# Patient Record
Sex: FEMALE | Race: WHITE | NOT HISPANIC OR LATINO | Employment: FULL TIME | URBAN - METROPOLITAN AREA
[De-identification: names, ages, dates, MRNs, and addresses within clinical notes are randomized per-mention and may not be internally consistent; named-entity substitution may affect disease eponyms.]

---

## 2017-04-14 ENCOUNTER — CONVERSION ENCOUNTER (OUTPATIENT)
Dept: MAMMOGRAPHY | Facility: HOSPITAL | Age: 52
End: 2017-04-14

## 2017-11-21 ENCOUNTER — CONVERSION ENCOUNTER (OUTPATIENT)
Dept: GENERAL RADIOLOGY | Facility: HOSPITAL | Age: 52
End: 2017-11-21

## 2018-03-19 ENCOUNTER — OFFICE VISIT CONVERTED (OUTPATIENT)
Dept: ONCOLOGY | Facility: HOSPITAL | Age: 53
End: 2018-03-19
Attending: INTERNAL MEDICINE

## 2018-07-20 ENCOUNTER — CONVERSION ENCOUNTER (OUTPATIENT)
Dept: MAMMOGRAPHY | Facility: HOSPITAL | Age: 53
End: 2018-07-20

## 2018-08-10 ENCOUNTER — OFFICE VISIT CONVERTED (OUTPATIENT)
Dept: FAMILY MEDICINE CLINIC | Facility: CLINIC | Age: 53
End: 2018-08-10
Attending: FAMILY MEDICINE

## 2018-08-27 ENCOUNTER — OFFICE VISIT CONVERTED (OUTPATIENT)
Dept: ONCOLOGY | Facility: HOSPITAL | Age: 53
End: 2018-08-27
Attending: NURSE PRACTITIONER

## 2018-10-08 ENCOUNTER — OFFICE VISIT CONVERTED (OUTPATIENT)
Dept: FAMILY MEDICINE CLINIC | Facility: CLINIC | Age: 53
End: 2018-10-08
Attending: FAMILY MEDICINE

## 2018-11-09 ENCOUNTER — OFFICE VISIT CONVERTED (OUTPATIENT)
Dept: FAMILY MEDICINE CLINIC | Facility: CLINIC | Age: 53
End: 2018-11-09
Attending: FAMILY MEDICINE

## 2018-12-12 ENCOUNTER — OFFICE VISIT CONVERTED (OUTPATIENT)
Dept: FAMILY MEDICINE CLINIC | Facility: CLINIC | Age: 53
End: 2018-12-12
Attending: FAMILY MEDICINE

## 2018-12-12 ENCOUNTER — CONVERSION ENCOUNTER (OUTPATIENT)
Dept: FAMILY MEDICINE CLINIC | Facility: CLINIC | Age: 53
End: 2018-12-12

## 2019-03-07 ENCOUNTER — HOSPITAL ENCOUNTER (OUTPATIENT)
Dept: ONCOLOGY | Facility: HOSPITAL | Age: 54
Discharge: HOME | End: 2019-03-07
Attending: INTERNAL MEDICINE

## 2019-03-07 LAB
CALCIUM SERPL-MCNC: 9.3 MG/DL (ref 8.7–10.4)
CREAT UR-MCNC: 1.39 MG/DL (ref 0.5–0.9)
MAGNESIUM SERPL-MCNC: 1.54 MG/DL (ref 1.6–2.3)
PHOSPHATE SERPL-MCNC: 2.7 MG/DL (ref 2.4–4.5)

## 2019-03-11 ENCOUNTER — OFFICE VISIT CONVERTED (OUTPATIENT)
Dept: FAMILY MEDICINE CLINIC | Facility: CLINIC | Age: 54
End: 2019-03-11
Attending: FAMILY MEDICINE

## 2019-03-11 ENCOUNTER — HOSPITAL ENCOUNTER (OUTPATIENT)
Dept: FAMILY MEDICINE CLINIC | Facility: CLINIC | Age: 54
Discharge: HOME | End: 2019-03-11

## 2019-03-11 LAB
CHOLEST SERPL-MCNC: 255 MG/DL (ref 107–200)
CHOLEST/HDLC SERPL: 4.3 {RATIO} (ref 3–6)
CONV CREATININE URINE, RANDOM: 57.2 MG/DL (ref 10–300)
CONV MICROALBUM.,U,RANDOM: <12 MG/L (ref 0–20)
EST. AVERAGE GLUCOSE BLD GHB EST-MCNC: 131 MG/DL
HBA1C MFR BLD: 6.2 % (ref 3.5–5.7)
HDLC SERPL-MCNC: 59 MG/DL (ref 40–60)
LDLC SERPL CALC-MCNC: 157 MG/DL (ref 70–100)
MICROALBUMIN/CREAT UR: 21 MG/G{CRE} (ref 0–35)
TRIGL SERPL-MCNC: 193 MG/DL (ref 40–150)
TSH SERPL-ACNC: 8.78 M[IU]/L (ref 0.27–4.2)
VLDLC SERPL-MCNC: 39 MG/DL (ref 5–37)

## 2019-03-14 ENCOUNTER — HOSPITAL ENCOUNTER (OUTPATIENT)
Dept: ONCOLOGY | Facility: HOSPITAL | Age: 54
Discharge: HOME | End: 2019-03-14
Attending: INTERNAL MEDICINE

## 2019-03-14 ENCOUNTER — OFFICE VISIT CONVERTED (OUTPATIENT)
Dept: ONCOLOGY | Facility: HOSPITAL | Age: 54
End: 2019-03-14
Attending: INTERNAL MEDICINE

## 2019-03-15 ENCOUNTER — HOSPITAL ENCOUNTER (OUTPATIENT)
Dept: OTHER | Facility: HOSPITAL | Age: 54
Discharge: HOME | End: 2019-03-15
Attending: INTERNAL MEDICINE

## 2019-08-12 ENCOUNTER — OFFICE VISIT CONVERTED (OUTPATIENT)
Dept: FAMILY MEDICINE CLINIC | Facility: CLINIC | Age: 54
End: 2019-08-12
Attending: FAMILY MEDICINE

## 2019-08-12 ENCOUNTER — HOSPITAL ENCOUNTER (OUTPATIENT)
Dept: FAMILY MEDICINE CLINIC | Facility: CLINIC | Age: 54
Discharge: HOME | End: 2019-08-12

## 2019-08-12 LAB
ALBUMIN SERPL-MCNC: 4.5 G/DL (ref 3.5–5)
ALBUMIN/GLOB SERPL: 1.7 {RATIO} (ref 1.4–2.6)
ALP SERPL-CCNC: 65 U/L (ref 53–141)
ALT SERPL-CCNC: 30 U/L (ref 10–40)
ANION GAP SERPL CALC-SCNC: 17 MMOL/L (ref 8–19)
AST SERPL-CCNC: 24 U/L (ref 15–50)
BILIRUB SERPL-MCNC: 0.4 MG/DL (ref 0.2–1.3)
BUN SERPL-MCNC: 20 MG/DL (ref 5–25)
BUN/CREAT SERPL: 14 {RATIO} (ref 6–20)
CALCIUM SERPL-MCNC: 9.5 MG/DL (ref 8.7–10.4)
CHLORIDE SERPL-SCNC: 106 MMOL/L (ref 99–111)
CHOLEST SERPL-MCNC: 136 MG/DL (ref 107–200)
CHOLEST/HDLC SERPL: 3 {RATIO} (ref 3–6)
CONV CO2: 22 MMOL/L (ref 22–32)
CONV TOTAL PROTEIN: 7.2 G/DL (ref 6.3–8.2)
CREAT UR-MCNC: 1.38 MG/DL (ref 0.5–0.9)
EST. AVERAGE GLUCOSE BLD GHB EST-MCNC: 171 MG/DL
GFR SERPLBLD BASED ON 1.73 SQ M-ARVRAT: 43 ML/MIN/{1.73_M2}
GLOBULIN UR ELPH-MCNC: 2.7 G/DL (ref 2–3.5)
GLUCOSE SERPL-MCNC: 145 MG/DL (ref 65–99)
HBA1C MFR BLD: 7.6 % (ref 3.5–5.7)
HDLC SERPL-MCNC: 45 MG/DL (ref 40–60)
LDLC SERPL CALC-MCNC: 65 MG/DL (ref 70–100)
OSMOLALITY SERPL CALC.SUM OF ELEC: 295 MOSM/KG (ref 273–304)
POTASSIUM SERPL-SCNC: 4.8 MMOL/L (ref 3.5–5.3)
SODIUM SERPL-SCNC: 140 MMOL/L (ref 135–147)
TRIGL SERPL-MCNC: 128 MG/DL (ref 40–150)
TSH SERPL-ACNC: 5.54 M[IU]/L (ref 0.27–4.2)
VLDLC SERPL-MCNC: 26 MG/DL (ref 5–37)

## 2019-09-05 ENCOUNTER — HOSPITAL ENCOUNTER (OUTPATIENT)
Dept: OTHER | Facility: HOSPITAL | Age: 54
Setting detail: RECURRING SERIES
Discharge: HOME | End: 2019-09-30
Attending: NURSE PRACTITIONER

## 2019-09-05 LAB
ALBUMIN SERPL-MCNC: 4.6 G/DL (ref 3.5–5)
ALBUMIN/GLOB SERPL: 1.8 {RATIO} (ref 1.4–2.6)
ALP SERPL-CCNC: 65 U/L (ref 53–141)
ALT SERPL-CCNC: 31 U/L (ref 10–40)
ANION GAP SERPL CALC-SCNC: 14 MMOL/L (ref 8–19)
AST SERPL-CCNC: 24 U/L (ref 15–50)
BASOPHILS # BLD AUTO: 0.02 10*3/UL (ref 0–0.2)
BASOPHILS NFR BLD AUTO: 0.3 % (ref 0–3)
BILIRUB SERPL-MCNC: 0.29 MG/DL (ref 0.2–1.3)
BUN SERPL-MCNC: 21 MG/DL (ref 5–25)
BUN/CREAT SERPL: 14 {RATIO} (ref 6–20)
CALCIUM SERPL-MCNC: 9.4 MG/DL (ref 8.7–10.4)
CHLORIDE SERPL-SCNC: 105 MMOL/L (ref 99–111)
CONV ABS IMM GRAN: 0.04 10*3/UL (ref 0–0.2)
CONV CO2: 25 MMOL/L (ref 22–32)
CONV IMMATURE GRAN: 0.7 % (ref 0–1.8)
CONV TOTAL PROTEIN: 7.2 G/DL (ref 6.3–8.2)
CREAT UR-MCNC: 1.48 MG/DL (ref 0.5–0.9)
DEPRECATED RDW RBC AUTO: 47 FL (ref 36.4–46.3)
EOSINOPHIL # BLD AUTO: 0.03 10*3/UL (ref 0–0.7)
EOSINOPHIL # BLD AUTO: 0.5 % (ref 0–7)
ERYTHROCYTE [DISTWIDTH] IN BLOOD BY AUTOMATED COUNT: 14.6 % (ref 11.7–14.4)
GFR SERPLBLD BASED ON 1.73 SQ M-ARVRAT: 40 ML/MIN/{1.73_M2}
GLOBULIN UR ELPH-MCNC: 2.6 G/DL (ref 2–3.5)
GLUCOSE SERPL-MCNC: 217 MG/DL (ref 65–99)
HCT VFR BLD AUTO: 35.4 % (ref 37–47)
HGB BLD-MCNC: 11.3 G/DL (ref 12–16)
LYMPHOCYTES # BLD AUTO: 1.37 10*3/UL (ref 1–5)
LYMPHOCYTES NFR BLD AUTO: 23.8 % (ref 20–45)
MAGNESIUM SERPL-MCNC: 1.73 MG/DL (ref 1.6–2.3)
MCH RBC QN AUTO: 28.3 PG (ref 27–31)
MCHC RBC AUTO-ENTMCNC: 31.9 G/DL (ref 33–37)
MCV RBC AUTO: 88.7 FL (ref 81–99)
MONOCYTES # BLD AUTO: 0.36 10*3/UL (ref 0.2–1.2)
MONOCYTES NFR BLD AUTO: 6.3 % (ref 3–10)
NEUTROPHILS # BLD AUTO: 3.93 10*3/UL (ref 2–8)
NEUTROPHILS NFR BLD AUTO: 68.4 % (ref 30–85)
NRBC CBCN: 0 % (ref 0–0.7)
OSMOLALITY SERPL CALC.SUM OF ELEC: 300 MOSM/KG (ref 273–304)
PHOSPHATE SERPL-MCNC: 2.9 MG/DL (ref 2.4–4.5)
PLATELET # BLD AUTO: 206 10*3/UL (ref 130–400)
PMV BLD AUTO: 10.7 FL (ref 9.4–12.3)
POTASSIUM SERPL-SCNC: 4.2 MMOL/L (ref 3.5–5.3)
RBC # BLD AUTO: 3.99 10*6/UL (ref 4.2–5.4)
SODIUM SERPL-SCNC: 140 MMOL/L (ref 135–147)
WBC # BLD AUTO: 5.75 10*3/UL (ref 4.8–10.8)

## 2019-09-12 ENCOUNTER — OFFICE VISIT CONVERTED (OUTPATIENT)
Dept: ONCOLOGY | Facility: HOSPITAL | Age: 54
End: 2019-09-12
Attending: INTERNAL MEDICINE

## 2019-09-12 ENCOUNTER — HOSPITAL ENCOUNTER (OUTPATIENT)
Dept: OTHER | Facility: HOSPITAL | Age: 54
Discharge: HOME | End: 2019-09-12
Attending: INTERNAL MEDICINE

## 2019-09-23 ENCOUNTER — OFFICE VISIT CONVERTED (OUTPATIENT)
Dept: FAMILY MEDICINE CLINIC | Facility: CLINIC | Age: 54
End: 2019-09-23
Attending: FAMILY MEDICINE

## 2019-09-23 ENCOUNTER — HOSPITAL ENCOUNTER (OUTPATIENT)
Dept: FAMILY MEDICINE CLINIC | Facility: CLINIC | Age: 54
Discharge: HOME | End: 2019-09-23
Attending: FAMILY MEDICINE

## 2019-09-23 LAB
ALBUMIN SERPL-MCNC: 4.9 G/DL (ref 3.5–5)
ALBUMIN/GLOB SERPL: 1.8 {RATIO} (ref 1.4–2.6)
ALP SERPL-CCNC: 68 U/L (ref 53–141)
ALT SERPL-CCNC: 31 U/L (ref 10–40)
AMYLASE SERPL-CCNC: 101 U/L (ref 30–110)
ANION GAP SERPL CALC-SCNC: 22 MMOL/L (ref 8–19)
AST SERPL-CCNC: 24 U/L (ref 15–50)
BASOPHILS # BLD AUTO: 0.02 10*3/UL (ref 0–0.2)
BASOPHILS NFR BLD AUTO: 0.3 % (ref 0–3)
BILIRUB SERPL-MCNC: 0.31 MG/DL (ref 0.2–1.3)
BUN SERPL-MCNC: 23 MG/DL (ref 5–25)
BUN/CREAT SERPL: 17 {RATIO} (ref 6–20)
CALCIUM SERPL-MCNC: 9.4 MG/DL (ref 8.7–10.4)
CHLORIDE SERPL-SCNC: 101 MMOL/L (ref 99–111)
CONV ABS IMM GRAN: 0.02 10*3/UL (ref 0–0.2)
CONV CO2: 18 MMOL/L (ref 22–32)
CONV IMMATURE GRAN: 0.3 % (ref 0–1.8)
CONV TOTAL PROTEIN: 7.6 G/DL (ref 6.3–8.2)
CREAT UR-MCNC: 1.39 MG/DL (ref 0.5–0.9)
DEPRECATED RDW RBC AUTO: 47 FL (ref 36.4–46.3)
EOSINOPHIL # BLD AUTO: 0.02 10*3/UL (ref 0–0.7)
EOSINOPHIL # BLD AUTO: 0.3 % (ref 0–7)
ERYTHROCYTE [DISTWIDTH] IN BLOOD BY AUTOMATED COUNT: 14.6 % (ref 11.7–14.4)
GFR SERPLBLD BASED ON 1.73 SQ M-ARVRAT: 43 ML/MIN/{1.73_M2}
GLOBULIN UR ELPH-MCNC: 2.7 G/DL (ref 2–3.5)
GLUCOSE SERPL-MCNC: 335 MG/DL (ref 65–99)
HCT VFR BLD AUTO: 38.4 % (ref 37–47)
HGB BLD-MCNC: 12 G/DL (ref 12–16)
LIPASE SERPL-CCNC: 79 U/L (ref 5–51)
LYMPHOCYTES # BLD AUTO: 1.79 10*3/UL (ref 1–5)
LYMPHOCYTES NFR BLD AUTO: 28.1 % (ref 20–45)
MCH RBC QN AUTO: 27.9 PG (ref 27–31)
MCHC RBC AUTO-ENTMCNC: 31.3 G/DL (ref 33–37)
MCV RBC AUTO: 89.3 FL (ref 81–99)
MONOCYTES # BLD AUTO: 0.44 10*3/UL (ref 0.2–1.2)
MONOCYTES NFR BLD AUTO: 6.9 % (ref 3–10)
NEUTROPHILS # BLD AUTO: 4.08 10*3/UL (ref 2–8)
NEUTROPHILS NFR BLD AUTO: 64.1 % (ref 30–85)
NRBC CBCN: 0 % (ref 0–0.7)
OSMOLALITY SERPL CALC.SUM OF ELEC: 301 MOSM/KG (ref 273–304)
PLATELET # BLD AUTO: 211 10*3/UL (ref 130–400)
PMV BLD AUTO: 11.5 FL (ref 9.4–12.3)
POTASSIUM SERPL-SCNC: 4.4 MMOL/L (ref 3.5–5.3)
RBC # BLD AUTO: 4.3 10*6/UL (ref 4.2–5.4)
SODIUM SERPL-SCNC: 137 MMOL/L (ref 135–147)
WBC # BLD AUTO: 6.37 10*3/UL (ref 4.8–10.8)

## 2019-09-27 ENCOUNTER — HOSPITAL ENCOUNTER (OUTPATIENT)
Dept: FAMILY MEDICINE CLINIC | Facility: CLINIC | Age: 54
Discharge: HOME | End: 2019-09-27
Attending: FAMILY MEDICINE

## 2019-09-27 ENCOUNTER — OFFICE VISIT CONVERTED (OUTPATIENT)
Dept: FAMILY MEDICINE CLINIC | Facility: CLINIC | Age: 54
End: 2019-09-27
Attending: FAMILY MEDICINE

## 2019-09-27 LAB
ALBUMIN SERPL-MCNC: 4.6 G/DL (ref 3.5–5)
ANION GAP SERPL CALC-SCNC: 21 MMOL/L (ref 8–19)
BUN SERPL-MCNC: 23 MG/DL (ref 5–25)
BUN/CREAT SERPL: 15 {RATIO} (ref 6–20)
CALCIUM SERPL-MCNC: 10.4 MG/DL (ref 8.7–10.4)
CHLORIDE SERPL-SCNC: 100 MMOL/L (ref 99–111)
CONV CO2: 24 MMOL/L (ref 22–32)
CREAT UR-MCNC: 1.51 MG/DL (ref 0.5–0.9)
EST. AVERAGE GLUCOSE BLD GHB EST-MCNC: 151 MG/DL
GFR SERPLBLD BASED ON 1.73 SQ M-ARVRAT: 39 ML/MIN/{1.73_M2}
GLUCOSE SERPL-MCNC: 143 MG/DL (ref 65–99)
HBA1C MFR BLD: 6.9 % (ref 3.5–5.7)
PHOSPHATE SERPL-MCNC: 3.7 MG/DL (ref 2.4–4.5)
POTASSIUM SERPL-SCNC: 4.2 MMOL/L (ref 3.5–5.3)
SODIUM SERPL-SCNC: 141 MMOL/L (ref 135–147)

## 2019-10-09 ENCOUNTER — OFFICE VISIT CONVERTED (OUTPATIENT)
Dept: FAMILY MEDICINE CLINIC | Facility: CLINIC | Age: 54
End: 2019-10-09
Attending: FAMILY MEDICINE

## 2020-01-10 ENCOUNTER — OFFICE VISIT CONVERTED (OUTPATIENT)
Dept: FAMILY MEDICINE CLINIC | Facility: CLINIC | Age: 55
End: 2020-01-10
Attending: FAMILY MEDICINE

## 2020-01-10 ENCOUNTER — HOSPITAL ENCOUNTER (OUTPATIENT)
Dept: FAMILY MEDICINE CLINIC | Facility: CLINIC | Age: 55
Discharge: HOME | End: 2020-01-10
Attending: FAMILY MEDICINE

## 2020-01-10 LAB
ALBUMIN SERPL-MCNC: 4.4 G/DL (ref 3.5–5)
ALBUMIN/GLOB SERPL: 1.5 {RATIO} (ref 1.4–2.6)
ALP SERPL-CCNC: 72 U/L (ref 53–141)
ALT SERPL-CCNC: 23 U/L (ref 10–40)
ANION GAP SERPL CALC-SCNC: 19 MMOL/L (ref 8–19)
AST SERPL-CCNC: 21 U/L (ref 15–50)
BASOPHILS # BLD AUTO: 0.02 10*3/UL (ref 0–0.2)
BASOPHILS # BLD: 0 % (ref 0–3)
BASOPHILS NFR BLD AUTO: 0.2 % (ref 0–3)
BILIRUB SERPL-MCNC: 0.36 MG/DL (ref 0.2–1.3)
BUN SERPL-MCNC: 32 MG/DL (ref 5–25)
BUN/CREAT SERPL: 12 {RATIO} (ref 6–20)
BURR CELLS BLD QL SMEAR: SLIGHT
CALCIUM SERPL-MCNC: 9.9 MG/DL (ref 8.7–10.4)
CHLORIDE SERPL-SCNC: 100 MMOL/L (ref 99–111)
CHOLEST SERPL-MCNC: 141 MG/DL (ref 107–200)
CHOLEST/HDLC SERPL: 3.4 {RATIO} (ref 3–6)
CONV ABS BANDS: 1 % (ref 1–5)
CONV ABS IMM GRAN: 0.08 10*3/UL (ref 0–0.2)
CONV ANISOCYTES: SLIGHT
CONV ATYPICAL LYMPHOCYTES: 1 % (ref 0–5)
CONV CO2: 20 MMOL/L (ref 22–32)
CONV IMMATURE GRAN: 0.9 % (ref 0–1.8)
CONV SEGMENTED NEUTROPHILS: 67 % (ref 45–70)
CONV TOTAL PROTEIN: 7.4 G/DL (ref 6.3–8.2)
CREAT UR-MCNC: 2.75 MG/DL (ref 0.5–0.9)
DEPRECATED RDW RBC AUTO: 48 FL (ref 36.4–46.3)
EOSINOPHIL # BLD AUTO: 0.11 10*3/UL (ref 0–0.7)
EOSINOPHIL # BLD AUTO: 1.3 % (ref 0–7)
EOSINOPHIL NFR BLD AUTO: 0 % (ref 0–7)
ERYTHROCYTE [DISTWIDTH] IN BLOOD BY AUTOMATED COUNT: 15.3 % (ref 11.7–14.4)
EST. AVERAGE GLUCOSE BLD GHB EST-MCNC: 163 MG/DL
GFR SERPLBLD BASED ON 1.73 SQ M-ARVRAT: 19 ML/MIN/{1.73_M2}
GLOBULIN UR ELPH-MCNC: 3 G/DL (ref 2–3.5)
GLUCOSE SERPL-MCNC: 340 MG/DL (ref 65–99)
HBA1C MFR BLD: 7.3 % (ref 3.5–5.7)
HCT VFR BLD AUTO: 40.2 % (ref 37–47)
HDLC SERPL-MCNC: 42 MG/DL (ref 40–60)
HGB BLD-MCNC: 12.4 G/DL (ref 12–16)
LDLC SERPL CALC-MCNC: 69 MG/DL (ref 70–100)
LYMPHOCYTES # BLD AUTO: 1.81 10*3/UL (ref 1–5)
LYMPHOCYTES NFR BLD AUTO: 21 % (ref 20–45)
MCH RBC QN AUTO: 27.3 PG (ref 27–31)
MCHC RBC AUTO-ENTMCNC: 30.8 G/DL (ref 33–37)
MCV RBC AUTO: 88.5 FL (ref 81–99)
MONOCYTES # BLD AUTO: 0.57 10*3/UL (ref 0.2–1.2)
MONOCYTES NFR BLD AUTO: 6.6 % (ref 3–10)
MONOCYTES NFR BLD MANUAL: 7 % (ref 3–10)
NEUTROPHILS # BLD AUTO: 6.01 10*3/UL (ref 2–8)
NEUTROPHILS NFR BLD AUTO: 70 % (ref 30–85)
NRBC CBCN: 0 % (ref 0–0.7)
NUC CELL # PRT MANUAL: 0 /100{WBCS}
OSMOLALITY SERPL CALC.SUM OF ELEC: 300 MOSM/KG (ref 273–304)
OVALOCYTES BLD QL SMEAR: SLIGHT
PLAT MORPH BLD: NORMAL
PLATELET # BLD AUTO: 230 10*3/UL (ref 130–400)
PMV BLD AUTO: 11.2 FL (ref 9.4–12.3)
POIKILOCYTOSIS BLD QL SMEAR: SLIGHT
POLYCHROMASIA BLD QL SMEAR: SLIGHT
POTASSIUM SERPL-SCNC: 4.2 MMOL/L (ref 3.5–5.3)
RBC # BLD AUTO: 4.54 10*6/UL (ref 4.2–5.4)
SMALL PLATELETS BLD QL SMEAR: ADEQUATE
SODIUM SERPL-SCNC: 135 MMOL/L (ref 135–147)
SPHEROCYTES BLD QL SMEAR: SLIGHT
T4 FREE SERPL-MCNC: 1 NG/DL (ref 0.9–1.8)
TRIGL SERPL-MCNC: 151 MG/DL (ref 40–150)
TSH SERPL-ACNC: 11.55 M[IU]/L (ref 0.27–4.2)
VARIANT LYMPHS NFR BLD MANUAL: 24 % (ref 20–45)
VLDLC SERPL-MCNC: 30 MG/DL (ref 5–37)
WBC # BLD AUTO: 8.6 10*3/UL (ref 4.8–10.8)

## 2020-01-16 ENCOUNTER — HOSPITAL ENCOUNTER (OUTPATIENT)
Dept: OTHER | Facility: HOSPITAL | Age: 55
Discharge: HOME | End: 2020-01-16
Attending: INTERNAL MEDICINE

## 2020-01-16 LAB
ALBUMIN SERPL-MCNC: 4.3 G/DL (ref 3.5–5)
ALBUMIN/GLOB SERPL: 1.3 {RATIO} (ref 1.4–2.6)
ALP SERPL-CCNC: 66 U/L (ref 53–141)
ALT SERPL-CCNC: 21 U/L (ref 10–40)
ANION GAP SERPL CALC-SCNC: 16 MMOL/L (ref 8–19)
APPEARANCE UR: CLEAR
AST SERPL-CCNC: 19 U/L (ref 15–50)
BILIRUB SERPL-MCNC: 0.33 MG/DL (ref 0.2–1.3)
BILIRUB UR QL: NEGATIVE
BUN SERPL-MCNC: 40 MG/DL (ref 5–25)
BUN/CREAT SERPL: 20 {RATIO} (ref 6–20)
CALCIUM SERPL-MCNC: 9.9 MG/DL (ref 8.7–10.4)
CHLORIDE SERPL-SCNC: 99 MMOL/L (ref 99–111)
COLOR UR: YELLOW
CONV BACTERIA: NEGATIVE
CONV CO2: 24 MMOL/L (ref 22–32)
CONV COLLECTION SOURCE (UA): ABNORMAL
CONV CREATININE URINE, RANDOM: 65.2 MG/DL (ref 10–300)
CONV MICROALBUM.,U,RANDOM: 42 MG/L (ref 0–20)
CONV TOTAL PROTEIN: 7.5 G/DL (ref 6.3–8.2)
CONV UROBILINOGEN IN URINE BY AUTOMATED TEST STRIP: 0.2 {EHRLICHU}/DL (ref 0.1–1)
CREAT UR-MCNC: 2.03 MG/DL (ref 0.5–0.9)
EST. AVERAGE GLUCOSE BLD GHB EST-MCNC: 169 MG/DL
GFR SERPLBLD BASED ON 1.73 SQ M-ARVRAT: 27 ML/MIN/{1.73_M2}
GLOBULIN UR ELPH-MCNC: 3.2 G/DL (ref 2–3.5)
GLUCOSE SERPL-MCNC: 127 MG/DL (ref 65–99)
GLUCOSE UR QL: NEGATIVE MG/DL
HBA1C MFR BLD: 7.5 % (ref 3.5–5.7)
HGB UR QL STRIP: ABNORMAL
KETONES UR QL STRIP: NEGATIVE MG/DL
LEUKOCYTE ESTERASE UR QL STRIP: ABNORMAL
MICROALBUMIN/CREAT UR: 64.4 MG/G{CRE} (ref 0–35)
NITRITE UR QL STRIP: NEGATIVE
OSMOLALITY SERPL CALC.SUM OF ELEC: 291 MOSM/KG (ref 273–304)
PH UR STRIP.AUTO: 5 [PH] (ref 5–8)
POTASSIUM SERPL-SCNC: 4.1 MMOL/L (ref 3.5–5.3)
PROT UR QL: 30 MG/DL
RBC #/AREA URNS HPF: ABNORMAL /[HPF]
RENAL EPI CELLS #/AREA URNS HPF: ABNORMAL /[HPF]
SODIUM SERPL-SCNC: 135 MMOL/L (ref 135–147)
SP GR UR: 1.01 (ref 1–1.03)
SQUAMOUS SPT QL MICRO: ABNORMAL /[HPF]
WBC #/AREA URNS HPF: ABNORMAL /[HPF]

## 2020-01-17 ENCOUNTER — OFFICE VISIT CONVERTED (OUTPATIENT)
Dept: FAMILY MEDICINE CLINIC | Facility: CLINIC | Age: 55
End: 2020-01-17
Attending: FAMILY MEDICINE

## 2020-03-11 ENCOUNTER — HOSPITAL ENCOUNTER (OUTPATIENT)
Dept: ONCOLOGY | Facility: HOSPITAL | Age: 55
Discharge: HOME | End: 2020-03-11
Attending: INTERNAL MEDICINE

## 2020-03-11 LAB
ALBUMIN SERPL-MCNC: 4.5 G/DL (ref 3.5–5)
ALBUMIN/GLOB SERPL: 1.7 {RATIO} (ref 1.4–2.6)
ALP SERPL-CCNC: 61 U/L (ref 53–141)
ALT SERPL-CCNC: 26 U/L (ref 10–40)
ANION GAP SERPL CALC-SCNC: 17 MMOL/L (ref 8–19)
AST SERPL-CCNC: 19 U/L (ref 15–50)
BASOPHILS # BLD AUTO: 0.02 10*3/UL (ref 0–0.2)
BASOPHILS NFR BLD AUTO: 0.3 % (ref 0–3)
BILIRUB SERPL-MCNC: 0.34 MG/DL (ref 0.2–1.3)
BUN SERPL-MCNC: 39 MG/DL (ref 5–25)
BUN/CREAT SERPL: 25 {RATIO} (ref 6–20)
CALCIUM SERPL-MCNC: 10.1 MG/DL (ref 8.7–10.4)
CALCIUM SPEC-SCNC: 10.1 MG/DL (ref 8.7–10.4)
CHLORIDE SERPL-SCNC: 99 MMOL/L (ref 99–111)
CONV ABS IMM GRAN: 0.03 10*3/UL (ref 0–0.54)
CONV CO2: 22 MMOL/L (ref 22–32)
CONV EOSINOPHILS PERCENT BY MANUAL COUNT: 1.7 % (ref 0–7)
CONV IMMATURE GRAN: 0.4 % (ref 0–0.4)
CONV TOTAL PROTEIN: 7.1 G/DL (ref 6.3–8.2)
CREAT UR-MCNC: 1.58 MG/DL (ref 0.5–0.9)
EOSINOPHIL # BLD MANUAL: 0.12 10*3/UL (ref 0–0.7)
ERYTHROCYTE [DISTWIDTH] IN BLOOD BY AUTOMATED COUNT: 14.2 % (ref 11.5–14.5)
ERYTHROCYTE [DISTWIDTH] IN BLOOD BY AUTOMATED COUNT: 45.4 FL
GFR SERPLBLD BASED ON 1.73 SQ M-ARVRAT: 37 ML/MIN/{1.73_M2}
GLOBULIN UR ELPH-MCNC: 2.6 G/DL (ref 2–3.5)
GLUCOSE SERPL-MCNC: 189 MG/DL (ref 65–99)
HBA1C MFR BLD: 12.2 G/DL (ref 12–16)
HCT VFR BLD AUTO: 37.6 % (ref 37–47)
LYMPHOCYTES # BLD AUTO: 2.2 10*3/UL (ref 1–5)
LYMPHOCYTES NFR BLD AUTO: 31.3 % (ref 20–45)
MAGNESIUM SERPL-MCNC: 1.6 MG/DL (ref 1.6–2.3)
MCH RBC QN AUTO: 28 PG (ref 27–31)
MCHC RBC AUTO-ENTMCNC: 32.4 G/DL (ref 33–37)
MCV RBC AUTO: 86.2 FL (ref 81–99)
MONOCYTES # BLD AUTO: 0.6 10*3/UL (ref 0.2–1.2)
MONOCYTES NFR BLD MANUAL: 8.5 % (ref 3–10)
NEUTROPHILS # BLD AUTO: 4.05 10*3/UL (ref 2–8)
NEUTROPHILS NFR BLD MANUAL: 57.8 % (ref 30–85)
OSMOLALITY SERPL CALC.SUM OF ELEC: 292 MOSM/KG (ref 273–304)
PHOSPHATE SERPL-MCNC: 3.8 MG/DL (ref 2.4–4.5)
PLATELET # BLD AUTO: 218 10*3/UL (ref 130–400)
PMV BLD AUTO: 10.5 FL (ref 7.4–10.4)
POTASSIUM SERPL-SCNC: 3.9 MMOL/L (ref 3.5–5.3)
RBC MORPH BLD: 4.36 10*6/UL (ref 4.2–5.4)
SODIUM SERPL-SCNC: 134 MMOL/L (ref 135–147)
WBC # BLD AUTO: 7.02 10*3/UL (ref 4.8–10.8)

## 2020-03-13 ENCOUNTER — HOSPITAL ENCOUNTER (OUTPATIENT)
Dept: OTHER | Facility: HOSPITAL | Age: 55
Discharge: HOME | End: 2020-03-13
Attending: INTERNAL MEDICINE

## 2020-03-13 LAB
ALBUMIN SERPL-MCNC: 4.6 G/DL (ref 3.5–5)
ALBUMIN/GLOB SERPL: 1.6 {RATIO} (ref 1.4–2.6)
ALP SERPL-CCNC: 60 U/L (ref 53–141)
ALT SERPL-CCNC: 31 U/L (ref 10–40)
ANION GAP SERPL CALC-SCNC: 17 MMOL/L (ref 8–19)
APPEARANCE UR: CLEAR
AST SERPL-CCNC: 23 U/L (ref 15–50)
BASOPHILS # BLD AUTO: 0.02 10*3/UL (ref 0–0.2)
BASOPHILS NFR BLD AUTO: 0.2 % (ref 0–3)
BILIRUB SERPL-MCNC: 0.42 MG/DL (ref 0.2–1.3)
BILIRUB UR QL: NEGATIVE
BUN SERPL-MCNC: 33 MG/DL (ref 5–25)
BUN/CREAT SERPL: 20 {RATIO} (ref 6–20)
CALCIUM SERPL-MCNC: 9.9 MG/DL (ref 8.7–10.4)
CHLORIDE SERPL-SCNC: 104 MMOL/L (ref 99–111)
COLOR UR: YELLOW
CONV ABS IMM GRAN: 0.03 10*3/UL (ref 0–0.2)
CONV BACTERIA: ABNORMAL
CONV CO2: 23 MMOL/L (ref 22–32)
CONV COLLECTION SOURCE (UA): ABNORMAL
CONV CREATININE URINE, RANDOM: 57.3 MG/DL (ref 10–300)
CONV IMMATURE GRAN: 0.4 % (ref 0–1.8)
CONV MICROALBUM.,U,RANDOM: 38.6 MG/L (ref 0–20)
CONV TOTAL PROTEIN: 7.5 G/DL (ref 6.3–8.2)
CONV UROBILINOGEN IN URINE BY AUTOMATED TEST STRIP: 1 {EHRLICHU}/DL (ref 0.1–1)
CREAT UR-MCNC: 1.68 MG/DL (ref 0.5–0.9)
DEPRECATED RDW RBC AUTO: 46.6 FL (ref 36.4–46.3)
EOSINOPHIL # BLD AUTO: 0.11 10*3/UL (ref 0–0.7)
EOSINOPHIL # BLD AUTO: 1.4 % (ref 0–7)
ERYTHROCYTE [DISTWIDTH] IN BLOOD BY AUTOMATED COUNT: 14.5 % (ref 11.7–14.4)
EST. AVERAGE GLUCOSE BLD GHB EST-MCNC: 166 MG/DL
GFR SERPLBLD BASED ON 1.73 SQ M-ARVRAT: 34 ML/MIN/{1.73_M2}
GLOBULIN UR ELPH-MCNC: 2.9 G/DL (ref 2–3.5)
GLUCOSE SERPL-MCNC: 104 MG/DL (ref 65–99)
GLUCOSE UR QL: >=1000 MG/DL
HBA1C MFR BLD: 7.4 % (ref 3.5–5.7)
HCT VFR BLD AUTO: 38.7 % (ref 37–47)
HGB BLD-MCNC: 12 G/DL (ref 12–16)
HGB UR QL STRIP: NEGATIVE
KETONES UR QL STRIP: NEGATIVE MG/DL
LEUKOCYTE ESTERASE UR QL STRIP: NEGATIVE
LYMPHOCYTES # BLD AUTO: 2.63 10*3/UL (ref 1–5)
LYMPHOCYTES NFR BLD AUTO: 32.6 % (ref 20–45)
MCH RBC QN AUTO: 27.6 PG (ref 27–31)
MCHC RBC AUTO-ENTMCNC: 31 G/DL (ref 33–37)
MCV RBC AUTO: 89 FL (ref 81–99)
MICROALBUMIN/CREAT UR: 67.4 MG/G{CRE} (ref 0–35)
MONOCYTES # BLD AUTO: 0.85 10*3/UL (ref 0.2–1.2)
MONOCYTES NFR BLD AUTO: 10.5 % (ref 3–10)
NEUTROPHILS # BLD AUTO: 4.42 10*3/UL (ref 2–8)
NEUTROPHILS NFR BLD AUTO: 54.9 % (ref 30–85)
NITRITE UR QL STRIP: NEGATIVE
NRBC CBCN: 0 % (ref 0–0.7)
OSMOLALITY SERPL CALC.SUM OF ELEC: 296 MOSM/KG (ref 273–304)
PH UR STRIP.AUTO: 5 [PH] (ref 5–8)
PLATELET # BLD AUTO: 256 10*3/UL (ref 130–400)
PMV BLD AUTO: 12.1 FL (ref 9.4–12.3)
POTASSIUM SERPL-SCNC: 4.6 MMOL/L (ref 3.5–5.3)
PROT UR QL: 30 MG/DL
RBC # BLD AUTO: 4.35 10*6/UL (ref 4.2–5.4)
RBC #/AREA URNS HPF: ABNORMAL /[HPF]
SODIUM SERPL-SCNC: 139 MMOL/L (ref 135–147)
SP GR UR: 1.02 (ref 1–1.03)
SQUAMOUS SPT QL MICRO: ABNORMAL /[HPF]
WBC # BLD AUTO: 8.06 10*3/UL (ref 4.8–10.8)
WBC #/AREA URNS HPF: ABNORMAL /[HPF]

## 2020-03-16 ENCOUNTER — OFFICE VISIT CONVERTED (OUTPATIENT)
Dept: ONCOLOGY | Facility: HOSPITAL | Age: 55
End: 2020-03-16
Attending: INTERNAL MEDICINE

## 2020-03-16 ENCOUNTER — HOSPITAL ENCOUNTER (OUTPATIENT)
Dept: OTHER | Facility: HOSPITAL | Age: 55
Discharge: HOME | End: 2020-03-16
Attending: INTERNAL MEDICINE

## 2020-03-17 ENCOUNTER — HOSPITAL ENCOUNTER (OUTPATIENT)
Dept: OTHER | Facility: HOSPITAL | Age: 55
Discharge: HOME | End: 2020-03-17
Attending: NURSE PRACTITIONER

## 2020-04-15 ENCOUNTER — OFFICE VISIT CONVERTED (OUTPATIENT)
Dept: FAMILY MEDICINE CLINIC | Facility: CLINIC | Age: 55
End: 2020-04-15
Attending: FAMILY MEDICINE

## 2020-04-15 ENCOUNTER — HOSPITAL ENCOUNTER (OUTPATIENT)
Dept: FAMILY MEDICINE CLINIC | Facility: CLINIC | Age: 55
Discharge: HOME | End: 2020-04-15
Attending: FAMILY MEDICINE

## 2020-04-15 LAB
ALBUMIN SERPL-MCNC: 4.4 G/DL (ref 3.5–5)
ALBUMIN/GLOB SERPL: 1.5 {RATIO} (ref 1.4–2.6)
ALP SERPL-CCNC: 68 U/L (ref 53–141)
ALT SERPL-CCNC: 24 U/L (ref 10–40)
ANION GAP SERPL CALC-SCNC: 19 MMOL/L (ref 8–19)
AST SERPL-CCNC: 19 U/L (ref 15–50)
BILIRUB SERPL-MCNC: 0.42 MG/DL (ref 0.2–1.3)
BUN SERPL-MCNC: 24 MG/DL (ref 5–25)
BUN/CREAT SERPL: 15 {RATIO} (ref 6–20)
CALCIUM SERPL-MCNC: 9.7 MG/DL (ref 8.7–10.4)
CHLORIDE SERPL-SCNC: 102 MMOL/L (ref 99–111)
CHOLEST SERPL-MCNC: 167 MG/DL (ref 107–200)
CHOLEST/HDLC SERPL: 3.5 {RATIO} (ref 3–6)
CONV CO2: 25 MMOL/L (ref 22–32)
CONV CREATININE URINE, RANDOM: 106.3 MG/DL (ref 10–300)
CONV MICROALBUM.,U,RANDOM: 48.7 MG/L (ref 0–20)
CONV TOTAL PROTEIN: 7.4 G/DL (ref 6.3–8.2)
CREAT UR-MCNC: 1.65 MG/DL (ref 0.5–0.9)
EST. AVERAGE GLUCOSE BLD GHB EST-MCNC: 154 MG/DL
GFR SERPLBLD BASED ON 1.73 SQ M-ARVRAT: 35 ML/MIN/{1.73_M2}
GLOBULIN UR ELPH-MCNC: 3 G/DL (ref 2–3.5)
GLUCOSE SERPL-MCNC: 224 MG/DL (ref 65–99)
HBA1C MFR BLD: 7 % (ref 3.5–5.7)
HDLC SERPL-MCNC: 48 MG/DL (ref 40–60)
LDLC SERPL CALC-MCNC: 85 MG/DL (ref 70–100)
MICROALBUMIN/CREAT UR: 45.8 MG/G{CRE} (ref 0–35)
OSMOLALITY SERPL CALC.SUM OF ELEC: 305 MOSM/KG (ref 273–304)
POTASSIUM SERPL-SCNC: 4.4 MMOL/L (ref 3.5–5.3)
SODIUM SERPL-SCNC: 142 MMOL/L (ref 135–147)
T4 FREE SERPL-MCNC: 1.3 NG/DL (ref 0.9–1.8)
TRIGL SERPL-MCNC: 171 MG/DL (ref 40–150)
TSH SERPL-ACNC: 6.08 M[IU]/L (ref 0.27–4.2)
VLDLC SERPL-MCNC: 34 MG/DL (ref 5–37)

## 2020-06-08 ENCOUNTER — HOSPITAL ENCOUNTER (OUTPATIENT)
Dept: LAB | Facility: HOSPITAL | Age: 55
Discharge: HOME | End: 2020-06-08
Attending: INTERNAL MEDICINE

## 2020-06-08 LAB
ALBUMIN SERPL-MCNC: 4.5 G/DL (ref 3.5–5)
ALBUMIN/GLOB SERPL: 1.5 {RATIO} (ref 1.4–2.6)
ALP SERPL-CCNC: 64 U/L (ref 53–141)
ALT SERPL-CCNC: 23 U/L (ref 10–40)
ANION GAP SERPL CALC-SCNC: 18 MMOL/L (ref 8–19)
AST SERPL-CCNC: 18 U/L (ref 15–50)
BILIRUB SERPL-MCNC: 0.38 MG/DL (ref 0.2–1.3)
BUN SERPL-MCNC: 27 MG/DL (ref 5–25)
BUN/CREAT SERPL: 20 {RATIO} (ref 6–20)
CALCIUM SERPL-MCNC: 10.5 MG/DL (ref 8.7–10.4)
CHLORIDE SERPL-SCNC: 103 MMOL/L (ref 99–111)
CONV CO2: 20 MMOL/L (ref 22–32)
CONV CREATININE URINE, RANDOM: 69.7 MG/DL (ref 10–300)
CONV MICROALBUM.,U,RANDOM: 53.4 MG/L (ref 0–20)
CONV TOTAL PROTEIN: 7.5 G/DL (ref 6.3–8.2)
CREAT UR-MCNC: 1.38 MG/DL (ref 0.5–0.9)
EST. AVERAGE GLUCOSE BLD GHB EST-MCNC: 163 MG/DL
GFR SERPLBLD BASED ON 1.73 SQ M-ARVRAT: 43 ML/MIN/{1.73_M2}
GLOBULIN UR ELPH-MCNC: 3 G/DL (ref 2–3.5)
GLUCOSE SERPL-MCNC: 128 MG/DL (ref 65–99)
HBA1C MFR BLD: 7.3 % (ref 3.5–5.7)
MICROALBUMIN/CREAT UR: 76.6 MG/G{CRE} (ref 0–35)
OSMOLALITY SERPL CALC.SUM OF ELEC: 291 MOSM/KG (ref 273–304)
POTASSIUM SERPL-SCNC: 4.2 MMOL/L (ref 3.5–5.3)
SODIUM SERPL-SCNC: 137 MMOL/L (ref 135–147)

## 2020-09-09 ENCOUNTER — HOSPITAL ENCOUNTER (OUTPATIENT)
Dept: OTHER | Facility: HOSPITAL | Age: 55
Discharge: HOME | End: 2020-09-09
Attending: INTERNAL MEDICINE

## 2020-09-09 LAB
ALBUMIN SERPL-MCNC: 4.4 G/DL (ref 3.5–5)
ALBUMIN/GLOB SERPL: 1.6 {RATIO} (ref 1.4–2.6)
ALP SERPL-CCNC: 66 U/L (ref 53–141)
ALT SERPL-CCNC: 29 U/L (ref 10–40)
ANION GAP SERPL CALC-SCNC: 20 MMOL/L (ref 8–19)
AST SERPL-CCNC: 23 U/L (ref 15–50)
BASOPHILS # BLD AUTO: 0.04 10*3/UL (ref 0–0.2)
BASOPHILS NFR BLD AUTO: 0.4 % (ref 0–3)
BILIRUB SERPL-MCNC: 0.29 MG/DL (ref 0.2–1.3)
BUN SERPL-MCNC: 26 MG/DL (ref 5–25)
BUN/CREAT SERPL: 17 {RATIO} (ref 6–20)
CALCIUM SERPL-MCNC: 9.8 MG/DL (ref 8.7–10.4)
CHLORIDE SERPL-SCNC: 101 MMOL/L (ref 99–111)
CONV ABS IMM GRAN: 0.05 10*3/UL (ref 0–0.2)
CONV CO2: 22 MMOL/L (ref 22–32)
CONV IMMATURE GRAN: 0.5 % (ref 0–1.8)
CONV TOTAL PROTEIN: 7.2 G/DL (ref 6.3–8.2)
CREAT UR-MCNC: 1.5 MG/DL (ref 0.5–0.9)
DEPRECATED RDW RBC AUTO: 49.6 FL (ref 36.4–46.3)
EOSINOPHIL # BLD AUTO: 0.29 10*3/UL (ref 0–0.7)
EOSINOPHIL # BLD AUTO: 3.2 % (ref 0–7)
ERYTHROCYTE [DISTWIDTH] IN BLOOD BY AUTOMATED COUNT: 15.5 % (ref 11.7–14.4)
GFR SERPLBLD BASED ON 1.73 SQ M-ARVRAT: 39 ML/MIN/{1.73_M2}
GLOBULIN UR ELPH-MCNC: 2.8 G/DL (ref 2–3.5)
GLUCOSE SERPL-MCNC: 323 MG/DL (ref 65–99)
HCT VFR BLD AUTO: 46.3 % (ref 37–47)
HGB BLD-MCNC: 14 G/DL (ref 12–16)
LYMPHOCYTES # BLD AUTO: 2.76 10*3/UL (ref 1–5)
LYMPHOCYTES NFR BLD AUTO: 30.1 % (ref 20–45)
MCH RBC QN AUTO: 26.8 PG (ref 27–31)
MCHC RBC AUTO-ENTMCNC: 30.2 G/DL (ref 33–37)
MCV RBC AUTO: 88.7 FL (ref 81–99)
MONOCYTES # BLD AUTO: 0.83 10*3/UL (ref 0.2–1.2)
MONOCYTES NFR BLD AUTO: 9.1 % (ref 3–10)
NEUTROPHILS # BLD AUTO: 5.2 10*3/UL (ref 2–8)
NEUTROPHILS NFR BLD AUTO: 56.7 % (ref 30–85)
NRBC CBCN: 0 % (ref 0–0.7)
OSMOLALITY SERPL CALC.SUM OF ELEC: 303 MOSM/KG (ref 273–304)
PLATELET # BLD AUTO: 261 10*3/UL (ref 130–400)
PMV BLD AUTO: 12.3 FL (ref 9.4–12.3)
POTASSIUM SERPL-SCNC: 4.6 MMOL/L (ref 3.5–5.3)
RBC # BLD AUTO: 5.22 10*6/UL (ref 4.2–5.4)
SODIUM SERPL-SCNC: 138 MMOL/L (ref 135–147)
WBC # BLD AUTO: 9.17 10*3/UL (ref 4.8–10.8)

## 2020-09-21 ENCOUNTER — OFFICE VISIT CONVERTED (OUTPATIENT)
Dept: FAMILY MEDICINE CLINIC | Facility: CLINIC | Age: 55
End: 2020-09-21
Attending: FAMILY MEDICINE

## 2020-09-21 ENCOUNTER — HOSPITAL ENCOUNTER (OUTPATIENT)
Dept: FAMILY MEDICINE CLINIC | Facility: CLINIC | Age: 55
Discharge: HOME | End: 2020-09-21
Attending: FAMILY MEDICINE

## 2020-09-21 LAB
ALBUMIN SERPL-MCNC: 4.6 G/DL (ref 3.5–5)
ALBUMIN/GLOB SERPL: 1.8 {RATIO} (ref 1.4–2.6)
ALP SERPL-CCNC: 64 U/L (ref 53–141)
ALT SERPL-CCNC: 28 U/L (ref 10–40)
ANION GAP SERPL CALC-SCNC: 16 MMOL/L (ref 8–19)
AST SERPL-CCNC: 26 U/L (ref 15–50)
BASOPHILS # BLD AUTO: 0.03 10*3/UL (ref 0–0.2)
BASOPHILS # BLD: 0 % (ref 0–3)
BASOPHILS NFR BLD AUTO: 0.4 % (ref 0–3)
BILIRUB SERPL-MCNC: 0.45 MG/DL (ref 0.2–1.3)
BUN SERPL-MCNC: 23 MG/DL (ref 5–25)
BUN/CREAT SERPL: 17 {RATIO} (ref 6–20)
CALCIUM SERPL-MCNC: 9.9 MG/DL (ref 8.7–10.4)
CHLORIDE SERPL-SCNC: 102 MMOL/L (ref 99–111)
CHOLEST SERPL-MCNC: 181 MG/DL (ref 107–200)
CHOLEST/HDLC SERPL: 3.4 {RATIO} (ref 3–6)
CONV ABS BANDS: 0 % (ref 1–5)
CONV ABS IMM GRAN: 0.03 10*3/UL (ref 0–0.2)
CONV ANISOCYTES: SLIGHT
CONV ATYPICAL LYMPHOCYTES: 15 % (ref 0–5)
CONV CO2: 24 MMOL/L (ref 22–32)
CONV CREATININE URINE, RANDOM: 67.8 MG/DL (ref 10–300)
CONV IMMATURE GRAN: 0.4 % (ref 0–1.8)
CONV MICROALBUM.,U,RANDOM: 15.8 MG/L (ref 0–20)
CONV SEGMENTED NEUTROPHILS: 65 % (ref 45–70)
CONV TOTAL PROTEIN: 7.2 G/DL (ref 6.3–8.2)
CREAT UR-MCNC: 1.39 MG/DL (ref 0.5–0.9)
DEPRECATED RDW RBC AUTO: 50.1 FL (ref 36.4–46.3)
EOSINOPHIL # BLD AUTO: 0.22 10*3/UL (ref 0–0.7)
EOSINOPHIL # BLD AUTO: 3.2 % (ref 0–7)
EOSINOPHIL NFR BLD AUTO: 1 % (ref 0–7)
ERYTHROCYTE [DISTWIDTH] IN BLOOD BY AUTOMATED COUNT: 15.6 % (ref 11.7–14.4)
EST. AVERAGE GLUCOSE BLD GHB EST-MCNC: 169 MG/DL
GFR SERPLBLD BASED ON 1.73 SQ M-ARVRAT: 43 ML/MIN/{1.73_M2}
GLOBULIN UR ELPH-MCNC: 2.6 G/DL (ref 2–3.5)
GLUCOSE SERPL-MCNC: 162 MG/DL (ref 65–99)
HBA1C MFR BLD: 7.5 % (ref 3.5–5.7)
HCT VFR BLD AUTO: 44.9 % (ref 37–47)
HDLC SERPL-MCNC: 53 MG/DL (ref 40–60)
HGB BLD-MCNC: 13.6 G/DL (ref 12–16)
LARGE PLATELETS: SLIGHT
LDLC SERPL CALC-MCNC: 94 MG/DL (ref 70–100)
LYMPHOCYTES # BLD AUTO: 2.24 10*3/UL (ref 1–5)
LYMPHOCYTES NFR BLD AUTO: 32.4 % (ref 20–45)
MCH RBC QN AUTO: 26.6 PG (ref 27–31)
MCHC RBC AUTO-ENTMCNC: 30.3 G/DL (ref 33–37)
MCV RBC AUTO: 87.9 FL (ref 81–99)
MICROALBUMIN/CREAT UR: 23.3 MG/G{CRE} (ref 0–35)
MONOCYTES # BLD AUTO: 0.78 10*3/UL (ref 0.2–1.2)
MONOCYTES NFR BLD AUTO: 11.3 % (ref 3–10)
MONOCYTES NFR BLD MANUAL: 6 % (ref 3–10)
NEUTROPHILS # BLD AUTO: 3.62 10*3/UL (ref 2–8)
NEUTROPHILS NFR BLD AUTO: 52.3 % (ref 30–85)
NRBC CBCN: 0 % (ref 0–0.7)
NUC CELL # PRT MANUAL: 0 /100{WBCS}
OSMOLALITY SERPL CALC.SUM OF ELEC: 293 MOSM/KG (ref 273–304)
PLAT MORPH BLD: NORMAL
PLATELET # BLD AUTO: 245 10*3/UL (ref 130–400)
PMV BLD AUTO: 11.3 FL (ref 9.4–12.3)
POTASSIUM SERPL-SCNC: 4.1 MMOL/L (ref 3.5–5.3)
RBC # BLD AUTO: 5.11 10*6/UL (ref 4.2–5.4)
SMALL PLATELETS BLD QL SMEAR: ADEQUATE
SODIUM SERPL-SCNC: 138 MMOL/L (ref 135–147)
T4 FREE SERPL-MCNC: 1.5 NG/DL (ref 0.9–1.8)
TRIGL SERPL-MCNC: 168 MG/DL (ref 40–150)
TSH SERPL-ACNC: 5.47 M[IU]/L (ref 0.27–4.2)
VARIANT LYMPHS NFR BLD MANUAL: 13 % (ref 20–45)
VLDLC SERPL-MCNC: 34 MG/DL (ref 5–37)
WBC # BLD AUTO: 6.92 10*3/UL (ref 4.8–10.8)

## 2020-09-22 LAB
CONV ANTI MICROSOMAL AB: 13 IU/ML (ref 0–34)
THYROGLOBULIN ANTIBODY: <1 IU/ML (ref 0–0.9)

## 2020-09-24 ENCOUNTER — HOSPITAL ENCOUNTER (OUTPATIENT)
Dept: ONCOLOGY | Facility: HOSPITAL | Age: 55
Discharge: HOME | End: 2020-09-24
Attending: INTERNAL MEDICINE

## 2020-09-24 ENCOUNTER — OFFICE VISIT CONVERTED (OUTPATIENT)
Dept: ONCOLOGY | Facility: HOSPITAL | Age: 55
End: 2020-09-24
Attending: INTERNAL MEDICINE

## 2020-09-24 LAB
ALBUMIN SERPL-MCNC: 4.7 G/DL (ref 3.5–5)
ALBUMIN/GLOB SERPL: 1.8 {RATIO} (ref 1.4–2.6)
ALP SERPL-CCNC: 71 U/L (ref 53–141)
ALT SERPL-CCNC: 24 U/L (ref 10–40)
ANION GAP SERPL CALC-SCNC: 15 MMOL/L (ref 8–19)
AST SERPL-CCNC: 17 U/L (ref 15–50)
BASOPHILS # BLD AUTO: 0.01 10*3/UL (ref 0–0.2)
BASOPHILS NFR BLD AUTO: 0.2 % (ref 0–3)
BILIRUB SERPL-MCNC: 0.51 MG/DL (ref 0.2–1.3)
BUN SERPL-MCNC: 27 MG/DL (ref 5–25)
BUN/CREAT SERPL: 20 {RATIO} (ref 6–20)
CALCIUM SERPL-MCNC: 10.4 MG/DL (ref 8.7–10.4)
CHLORIDE SERPL-SCNC: 98 MMOL/L (ref 99–111)
CONV ABS IMM GRAN: 0.03 10*3/UL (ref 0–0.54)
CONV CO2: 28 MMOL/L (ref 22–32)
CONV EOSINOPHILS PERCENT BY MANUAL COUNT: 2.5 % (ref 0–7)
CONV IMMATURE GRAN: 0.5 % (ref 0–0.4)
CONV TOTAL PROTEIN: 7.3 G/DL (ref 6.3–8.2)
CREAT UR-MCNC: 1.33 MG/DL (ref 0.5–0.9)
EOSINOPHIL # BLD MANUAL: 0.16 10*3/UL (ref 0–0.7)
ERYTHROCYTE [DISTWIDTH] IN BLOOD BY AUTOMATED COUNT: 15 % (ref 11.5–14.5)
ERYTHROCYTE [DISTWIDTH] IN BLOOD BY AUTOMATED COUNT: 45.5 FL
GFR SERPLBLD BASED ON 1.73 SQ M-ARVRAT: 45 ML/MIN/{1.73_M2}
GLOBULIN UR ELPH-MCNC: 2.6 G/DL (ref 2–3.5)
GLUCOSE SERPL-MCNC: 350 MG/DL (ref 65–99)
HBA1C MFR BLD: 13.4 G/DL (ref 12–16)
HCT VFR BLD AUTO: 41.5 % (ref 37–47)
LYMPHOCYTES # BLD AUTO: 2.02 10*3/UL (ref 1–5)
LYMPHOCYTES NFR BLD AUTO: 32.2 % (ref 20–45)
MAGNESIUM SERPL-MCNC: 1.81 MG/DL (ref 1.6–2.3)
MCH RBC QN AUTO: 27 PG (ref 27–31)
MCHC RBC AUTO-ENTMCNC: 32.3 G/DL (ref 33–37)
MCV RBC AUTO: 83.7 FL (ref 81–99)
MONOCYTES # BLD AUTO: 0.5 10*3/UL (ref 0.2–1.2)
MONOCYTES NFR BLD MANUAL: 8 % (ref 3–10)
NEUTROPHILS # BLD AUTO: 3.56 10*3/UL (ref 2–8)
NEUTROPHILS NFR BLD MANUAL: 56.6 % (ref 30–85)
OSMOLALITY SERPL CALC.SUM OF ELEC: 303 MOSM/KG (ref 273–304)
PHOSPHATE SERPL-MCNC: 4.3 MG/DL (ref 2.4–4.5)
PLATELET # BLD AUTO: 216 10*3/UL (ref 130–400)
PMV BLD AUTO: 10.2 FL (ref 7.4–10.4)
POTASSIUM SERPL-SCNC: 4.3 MMOL/L (ref 3.5–5.3)
RBC MORPH BLD: 4.96 10*6/UL (ref 4.2–5.4)
SODIUM SERPL-SCNC: 137 MMOL/L (ref 135–147)
WBC # BLD AUTO: 6.28 10*3/UL (ref 4.8–10.8)

## 2020-09-25 ENCOUNTER — OFFICE VISIT CONVERTED (OUTPATIENT)
Dept: FAMILY MEDICINE CLINIC | Facility: CLINIC | Age: 55
End: 2020-09-25
Attending: FAMILY MEDICINE

## 2021-01-28 ENCOUNTER — HOSPITAL ENCOUNTER (OUTPATIENT)
Dept: OTHER | Facility: HOSPITAL | Age: 56
Discharge: HOME | End: 2021-01-28
Attending: INTERNAL MEDICINE

## 2021-01-28 LAB
ALBUMIN SERPL-MCNC: 4.2 G/DL (ref 3.5–5)
ALBUMIN/GLOB SERPL: 1.6 {RATIO} (ref 1.4–2.6)
ALP SERPL-CCNC: 58 U/L (ref 53–141)
ALT SERPL-CCNC: 41 U/L (ref 10–40)
ANION GAP SERPL CALC-SCNC: 15 MMOL/L (ref 8–19)
AST SERPL-CCNC: 30 U/L (ref 15–50)
BASOPHILS # BLD AUTO: 0.02 10*3/UL (ref 0–0.2)
BASOPHILS NFR BLD AUTO: 0.3 % (ref 0–3)
BILIRUB SERPL-MCNC: 0.33 MG/DL (ref 0.2–1.3)
BUN SERPL-MCNC: 23 MG/DL (ref 5–25)
BUN/CREAT SERPL: 15 {RATIO} (ref 6–20)
CALCIUM SERPL-MCNC: 9.7 MG/DL (ref 8.7–10.4)
CHLORIDE SERPL-SCNC: 99 MMOL/L (ref 99–111)
CHOLEST SERPL-MCNC: 168 MG/DL (ref 107–200)
CHOLEST/HDLC SERPL: 3.1 {RATIO} (ref 3–6)
CONV ABS IMM GRAN: 0.03 10*3/UL (ref 0–0.2)
CONV CO2: 28 MMOL/L (ref 22–32)
CONV IMMATURE GRAN: 0.5 % (ref 0–1.8)
CONV TOTAL PROTEIN: 6.9 G/DL (ref 6.3–8.2)
CREAT UR-MCNC: 1.58 MG/DL (ref 0.5–0.9)
DEPRECATED RDW RBC AUTO: 44.2 FL (ref 36.4–46.3)
EOSINOPHIL # BLD AUTO: 0.07 10*3/UL (ref 0–0.7)
EOSINOPHIL # BLD AUTO: 1.1 % (ref 0–7)
ERYTHROCYTE [DISTWIDTH] IN BLOOD BY AUTOMATED COUNT: 13.9 % (ref 11.7–14.4)
EST. AVERAGE GLUCOSE BLD GHB EST-MCNC: 169 MG/DL
GFR SERPLBLD BASED ON 1.73 SQ M-ARVRAT: 36 ML/MIN/{1.73_M2}
GLOBULIN UR ELPH-MCNC: 2.7 G/DL (ref 2–3.5)
GLUCOSE SERPL-MCNC: 245 MG/DL (ref 65–99)
HBA1C MFR BLD: 7.5 % (ref 3.5–5.7)
HCT VFR BLD AUTO: 42.2 % (ref 37–47)
HDLC SERPL-MCNC: 54 MG/DL (ref 40–60)
HGB BLD-MCNC: 13.2 G/DL (ref 12–16)
LDLC SERPL CALC-MCNC: 58 MG/DL (ref 70–100)
LYMPHOCYTES # BLD AUTO: 1.79 10*3/UL (ref 1–5)
LYMPHOCYTES NFR BLD AUTO: 28.7 % (ref 20–45)
MCH RBC QN AUTO: 27.3 PG (ref 27–31)
MCHC RBC AUTO-ENTMCNC: 31.3 G/DL (ref 33–37)
MCV RBC AUTO: 87.4 FL (ref 81–99)
MONOCYTES # BLD AUTO: 0.62 10*3/UL (ref 0.2–1.2)
MONOCYTES NFR BLD AUTO: 9.9 % (ref 3–10)
NEUTROPHILS # BLD AUTO: 3.71 10*3/UL (ref 2–8)
NEUTROPHILS NFR BLD AUTO: 59.5 % (ref 30–85)
NRBC CBCN: 0 % (ref 0–0.7)
OSMOLALITY SERPL CALC.SUM OF ELEC: 298 MOSM/KG (ref 273–304)
PLATELET # BLD AUTO: 209 10*3/UL (ref 130–400)
PMV BLD AUTO: 11.3 FL (ref 9.4–12.3)
POTASSIUM SERPL-SCNC: 4.2 MMOL/L (ref 3.5–5.3)
RBC # BLD AUTO: 4.83 10*6/UL (ref 4.2–5.4)
SODIUM SERPL-SCNC: 138 MMOL/L (ref 135–147)
TRIGL SERPL-MCNC: 279 MG/DL (ref 40–150)
VLDLC SERPL-MCNC: 56 MG/DL (ref 5–37)
WBC # BLD AUTO: 6.24 10*3/UL (ref 4.8–10.8)

## 2021-01-29 LAB
CONV CREATININE URINE, RANDOM: 47.9 MG/DL (ref 10–300)
CONV MICROALBUM.,U,RANDOM: <12 MG/L (ref 0–20)
MICROALBUMIN/CREAT UR: 25.1 MG/G{CRE} (ref 0–35)

## 2021-03-02 ENCOUNTER — OFFICE VISIT CONVERTED (OUTPATIENT)
Dept: FAMILY MEDICINE CLINIC | Facility: CLINIC | Age: 56
End: 2021-03-02
Attending: FAMILY MEDICINE

## 2021-03-02 ENCOUNTER — HOSPITAL ENCOUNTER (OUTPATIENT)
Dept: FAMILY MEDICINE CLINIC | Facility: CLINIC | Age: 56
Discharge: HOME | End: 2021-03-02
Attending: FAMILY MEDICINE

## 2021-03-02 LAB
CHOLEST SERPL-MCNC: 160 MG/DL (ref 107–200)
CHOLEST/HDLC SERPL: 3.5 {RATIO} (ref 3–6)
HDLC SERPL-MCNC: 46 MG/DL (ref 40–60)
LDLC SERPL CALC-MCNC: 88 MG/DL (ref 70–100)
T4 FREE SERPL-MCNC: 1.8 NG/DL (ref 0.9–1.8)
TRIGL SERPL-MCNC: 129 MG/DL (ref 40–150)
TSH SERPL-ACNC: 4.99 M[IU]/L (ref 0.27–4.2)
VLDLC SERPL-MCNC: 26 MG/DL (ref 5–37)

## 2021-03-25 ENCOUNTER — OFFICE VISIT CONVERTED (OUTPATIENT)
Dept: ONCOLOGY | Facility: HOSPITAL | Age: 56
End: 2021-03-25
Attending: INTERNAL MEDICINE

## 2021-03-25 ENCOUNTER — HOSPITAL ENCOUNTER (OUTPATIENT)
Dept: ONCOLOGY | Facility: HOSPITAL | Age: 56
Discharge: HOME | End: 2021-03-25
Attending: INTERNAL MEDICINE

## 2021-03-25 LAB
ALBUMIN SERPL-MCNC: 4.5 G/DL (ref 3.5–5)
ALBUMIN/GLOB SERPL: 1.8 {RATIO} (ref 1.4–2.6)
ALP SERPL-CCNC: 70 U/L (ref 53–141)
ALT SERPL-CCNC: 31 U/L (ref 10–40)
ANION GAP SERPL CALC-SCNC: 14 MMOL/L (ref 8–19)
AST SERPL-CCNC: 22 U/L (ref 15–50)
BASOPHILS # BLD AUTO: 0.02 10*3/UL (ref 0–0.2)
BASOPHILS NFR BLD AUTO: 0.3 % (ref 0–3)
BILIRUB SERPL-MCNC: 0.39 MG/DL (ref 0.2–1.3)
BUN SERPL-MCNC: 29 MG/DL (ref 5–25)
BUN/CREAT SERPL: 23 {RATIO} (ref 6–20)
CALCIUM SERPL-MCNC: 10.2 MG/DL (ref 8.7–10.4)
CHLORIDE SERPL-SCNC: 101 MMOL/L (ref 99–111)
CONV ABS IMM GRAN: 0.03 10*3/UL (ref 0–0.54)
CONV CO2: 26 MMOL/L (ref 22–32)
CONV EOSINOPHILS PERCENT BY MANUAL COUNT: 2.1 % (ref 0–7)
CONV IMMATURE GRAN: 0.5 % (ref 0–0.4)
CONV TOTAL PROTEIN: 7 G/DL (ref 6.3–8.2)
CREAT UR-MCNC: 1.27 MG/DL (ref 0.5–0.9)
EOSINOPHIL # BLD MANUAL: 0.14 10*3/UL (ref 0–0.7)
ERYTHROCYTE [DISTWIDTH] IN BLOOD BY AUTOMATED COUNT: 13.7 % (ref 11.5–14.5)
ERYTHROCYTE [DISTWIDTH] IN BLOOD BY AUTOMATED COUNT: 42.1 FL
GFR SERPLBLD BASED ON 1.73 SQ M-ARVRAT: 47 ML/MIN/{1.73_M2}
GLOBULIN UR ELPH-MCNC: 2.5 G/DL (ref 2–3.5)
GLUCOSE SERPL-MCNC: 192 MG/DL (ref 65–99)
HBA1C MFR BLD: 13.8 G/DL (ref 12–16)
HCT VFR BLD AUTO: 41.7 % (ref 37–47)
LYMPHOCYTES # BLD AUTO: 1.79 10*3/UL (ref 1–5)
LYMPHOCYTES NFR BLD AUTO: 27.4 % (ref 20–45)
MAGNESIUM SERPL-MCNC: 1.8 MG/DL (ref 1.6–2.3)
MCH RBC QN AUTO: 27.8 PG (ref 27–31)
MCHC RBC AUTO-ENTMCNC: 33.1 G/DL (ref 33–37)
MCV RBC AUTO: 84.1 FL (ref 81–99)
MONOCYTES # BLD AUTO: 0.68 10*3/UL (ref 0.2–1.2)
MONOCYTES NFR BLD MANUAL: 10.4 % (ref 3–10)
NEUTROPHILS # BLD AUTO: 3.88 10*3/UL (ref 2–8)
NEUTROPHILS NFR BLD MANUAL: 59.3 % (ref 30–85)
OSMOLALITY SERPL CALC.SUM OF ELEC: 295 MOSM/KG (ref 273–304)
PHOSPHATE SERPL-MCNC: 3.4 MG/DL (ref 2.4–4.5)
PLATELET # BLD AUTO: 208 10*3/UL (ref 130–400)
PMV BLD AUTO: 10.3 FL (ref 7.4–10.4)
POTASSIUM SERPL-SCNC: 4 MMOL/L (ref 3.5–5.3)
RBC MORPH BLD: 4.96 10*6/UL (ref 4.2–5.4)
SODIUM SERPL-SCNC: 137 MMOL/L (ref 135–147)
WBC # BLD AUTO: 6.54 10*3/UL (ref 4.8–10.8)

## 2021-05-07 NOTE — PROGRESS NOTES
Progress Note      Patient Name: Fiona Portillo   Patient ID: [id number]   Sex: Female   YOB: 1965        Visit Date: October 8, 2018    Provider: Charmaine Mena MD   Location: [facility]   Location Address: [address] [place] [address]  [id number]   Location Phone: [phone]          Chief Complaint     Follow up on thyroid       History Of Present Illness  Fiona Portillo is a 53 year old /White female who presents for evaluation and treatment of:      Her is TSH is low so we have to decrease her dose to 75  She is taking 88mcg right now.  She has no energy.  She needs a referral to see a pulmonologist about her sleep apnea machine.   She has ANDRES  She is taking Vit D 50,000 weekly.    She has changed considerably in her TSH numbers.  At this time we actually need to decrease her dose.  I want to recheck her at 2 months.    She has hotflashes that wake her a lot.  She tried primrose and she can't tell any difference. She was on Effexor also and she was still waking up several times in the night.  She isn't sleeping well.  Melatonin didn't help either.  She goes to bed at the same time every night.       Past Medical History  Disease Name Date Onset Notes   Breast Cancer --  --    Depression --  --    Diabetes mellitus, type 2 --  --    Hyperlipidemia --  --    Hypertension, Benign Essential --  --    Hypothyroid --  --    Sleep apnea --  --          Past Surgical History  Procedure Name Date Notes   Breast Lumpectomy --  --    Tonsillectomy --  --    Tubal ligation --  --          Medication List  Name Date Started Instructions   anastrozole 1 mg oral tablet 08/20/2018 take 1 tablet (1 mg) by oral route once daily for 90 days   ergocalciferol (vitamin D2) 50,000 unit oral capsule  take 1 capsule by oral route once daily   fexofenadine 180 mg oral tablet  take 1 tablet (180 mg) by oral route once daily   FreeStyle Lancets 28 gauge miscellaneous misc  use  "as directed   Janumet 50-1,000 mg oral tablet  take 1 tablet by oral route 2 times per day with meals   krill oil-omega-3-dha-epa 300-90 (27-45) mg oral capsule  --    levothyroxine 100 mcg oral tablet 10/01/2018 take 1 tablet by oral route daily for 90 days x 4 days per wk. Take levothyroine 112 on 2 days   rosuvastatin 40 mg oral tablet 08/20/2018 take 1 tablet (40 mg) by oral route once daily for 90 days   telmisartan 40 mg oral tablet  take 1 tablet (40 mg) by oral route once daily         Allergy List  Allergen Name Date Reaction Notes   lisinopril --  --  --          Family Medical History  Disease Name Relative/Age Notes   DM Type II / Father; Mother; Grandmother (paternal); Sister    Father/     Grandmother (paternal)/     Mother/     Sister/    Family History Of Breast Cancer / Mother    Mother/    Father, Grandfather, or Brother developed Heart Disease before the age of 65 / --    Hyperlipidemia / Father; Mother    Father/     Mother/    Hypertension / Mother    Mother/    Mother, Grandmother, or Sister developed Heart Disease before the age of 65 / --          Social History  Finding Status Start/Stop Quantity Notes   Alcohol Former --/-- --  drank alcohol in the past, 7 or less drinks per week   Exercises regularly --  --/-- --  1-2 times per week   Recreational Drug Use Never --/-- --  never used   Tobacco Never --/-- --  never smoker   Uses seatbelts --  --/-- --  yes         Vitals  Date Time BP Position Site L\R Cuff Size HR RR TEMP(F) WT  HT  BMI kg/m2 BSA m2 O2 Sat HC       10/08/2018 11:17 /72 Sitting    102 - R  97.8 178lbs 0oz 5'  1\" 33.63 1.86 98 %           Physical Examination  · Constitutional  o Appearance  o : well developed, well-nourished, slightly tired  · Eyes  o Conjunctivae  o : conjunctivae normal  · Neck  o Inspection/Palpation  o : supple  o Thyroid  o : thyromegaly  · Respiratory  o Respiratory Effort  o : breathing unlabored  o Auscultation of Lungs  o : clear to " ascultation  · Cardiovascular  o Heart  o :   § Auscultation of Heart  § : regular rate and rhythm  o Peripheral Vascular System  o :   § Extremities  § : no edema  · Lymphatic  o Neck  o : no lymphadenopathy present  · Musculoskeletal  o General  o :   § General Musculoskeletal  § : grossly normal.  · Skin and Subcutaneous Tissue  o General Inspection  o : normal  · Neurologic  o Gait and Station  o :   § Gait Screening  § : normal gait  · Psychiatric  o Mood and Affect  o : mood normal, affect appropriate          Assessment  · Need for influenza vaccination     V04.81/Z23  · Fatigue     780.79/R53.83  · Hypothyroidism     244.9/E03.9  · Vitamin D deficiency     268.9/E55.9      Plan  · Orders  o Flucelvax Quadrivalent Syringes [facility] (33555) - V04.81/Z23 - 10/08/2018  o B12 Folate levels (B12FO) - 780.79/R53.83 - 10/08/2018  o Vitamin D Level (17332) - 268.9/E55.9 - 10/08/2018  o CBC with Auto Diff [facility] (26816) - - 10/08/2018  o Vitamin D (25-Hydroxy) Level (83520) - 268.9/E55.9 - 10/08/2018  o ACO-39: Current medications updated and reviewed () - - 10/08/2018  o Fluzone Quadrivalent Vaccine, age 3+ (38360) - - 10/08/2018   Vaccine - Influenza; Dose: 0.5; Site: Left Upper Arm; Route: Intramuscular; Date: 10/08/2018 12:11:00; Exp: 06/30/2019; Lot: [id number]; Mfg: sanofi pasteur; TradeName: Fluzone Quadrivalent; Administered By: Renu Mckeon MA; Comment: ndc [id number]  · Medications  o ergocalciferol (vitamin D2) 50,000 unit oral capsule   SIG: take 1 capsule by oral route once daily for 90 days   DISP: (90) capsule with 1 refills  Adjusted on 10/08/2018     o levothyroxine 75 mcg oral tablet   SIG: take 1 tablet (75 mcg) by oral route once daily for 90 days   DISP: (90) tablets with 0 refills  Adjusted on 10/08/2018     · Instructions  o Patient was educated/instructed on their diagnosis, treatment and medications prior to discharge from the clinic today.            Electronically Signed by: Charmaine SPENCER  MD Rajesh -Author on October 8, 2018 12:55:51 PM

## 2021-05-07 NOTE — PROGRESS NOTES
"   Progress Note      Patient Name: Fiona Portillo   Patient ID: [id number]   Sex: Female   YOB: 1965        Visit Date: March 2, 2021    Provider: Jesica Alejandra DO   Location: [facility]   Location Address: [address] [place] [address] [address] [place]  [id number]   Location Phone: [phone]          Chief Complaint     Medication refills and labs       History Of Present Illness  Fiona Portillo is a 55 year old /White female who presents for evaluation and treatment of:      200. She denies any pedal concerns today. She reports that she will be scheduling her dilated retinal exam for 04/2021 - most recent was completed in 2019.    2.) HTN : Blood pressure today measured at 110/72. Patient denies any concerns with her current dosages of rx.     3.) HYPOTHYROID : Most recent TSH was completed 9.2020 - 5.470. The patient denies any sxs concerning for hypo- or hyperthyroidism. She denies any concerns regarding the current dose of her rx.     4.) PREVENTIVE CARE : Due for a mammogram - will be ordered per hematology/oncology - history of breast cancer - right lumpectomy. Most recent colonoscopy was completed in 2017 and was normal.\">1.) NIDDM : Patient reports most A1C (7.3%), CBC and CMP per nephrology - Dr. Briones - will request. She reports fasting blood glucose mostly in the 150's. She reports random blood glucose mostly in the 60's. She denies any episodes of blood glucose < 60 or > 200. She denies any pedal concerns today. She reports that she will be scheduling her dilated retinal exam for 04/2021 - most recent was completed in 2019.    2.) HTN : Blood pressure today measured at 110/72. Patient denies any concerns with her current dosages of rx.     3.) HYPOTHYROID : Most recent TSH was completed 9.2020 - 5.470. The patient denies any sxs concerning for hypo- or hyperthyroidism. She denies any concerns regarding the current dose of her rx.     4.) PREVENTIVE CARE : Due for a " mammogram - will be ordered per hematology/oncology - history of breast cancer - right lumpectomy. Most recent colonoscopy was completed in 2017 and was normal.       Past Medical History  Disease Name Date Onset Notes   CKD (chronic kidney disease), stage III --  --    Depression --  --    History of breast cancer --  In remission - history of right lumpectomy - chemo + radiation    Hyperlipidemia --  --    Hypertension, Benign Essential --  --    Hypothyroid --  --    Non-insulin dependent type 2 diabetes mellitus --  --    ANDRES on CPAP --  --          Past Surgical History  Procedure Name Date Notes   Breast Lumpectomy --  --    Colonoscopy 02/08/17 --    Tonsillectomy --  --    Tubal ligation --  1994         Medication List  Name Date Started Instructions   anastrozole 1 mg oral tablet 03/02/2021 TAKE 1 TABLET DAILY for 90 days   Effexor XR 75 mg oral capsule,extended release 24hr 03/02/2021 take 3 capsules by oral route daily for 60 days   fexofenadine 180 mg oral tablet  take 1 tablet (180 mg) by oral route once daily   FreeStyle Lancets 28 gauge miscellaneous misc 07/28/2020 USE AS DIRECTED   FreeStyle Lite Strips miscellaneous strip 04/17/2020 Test BID   Invokana 100 mg oral tablet 03/02/2021 TAKE 1 TABLET DAILY BEFORE THE FIRST MEAL OF THE DAY for 90 days   Januvia 100 mg oral tablet 03/02/2021 take 1 tablet (100 mg) by oral route once daily for 90 days   levothyroxine 125 mcg oral tablet 03/02/2021 TAKE 1 TABLET DAILY 30 MINUTES BEFORE BREAKFAST ON AN EMPTY STOMACH for 90 days   rosuvastatin 40 mg oral tablet 03/02/2021 TAKE 1 TABLET DAILY   telmisartan 40 mg oral tablet 03/02/2021 TAKE 1 TABLET DAILY   Vitamin D2 50,000 unit oral capsule  take 1 capsule (50,000 unit) by oral route once weekly         Allergy List  Allergen Name Date Reaction Notes   lisinopril --  --  --          Family Medical History  Disease Name Relative/Age Notes   DM Type II Father/  Grandmother (paternal)/  Mother/  Sister/    Father; Mother; Grandmother (paternal); Sister   Hyperlipidemia Father/  Mother/   Father; Mother   Hypertension Mother/   Mother   Family History Of Breast Cancer Mother/   Mother   Mother, Grandmother, or Sister developed Heart Disease before the age of 65  --    Father, Grandfather, or Brother developed Heart Disease before the age of 65  --          Social History  Finding Status Start/Stop Quantity Notes   Alcohol Former --/-- --  drank alcohol in the past, 7 or less drinks per week   Exercises regularly --  --/-- --  1-2 times per week   Recreational Drug Use Never --/-- --  never used   Tobacco Never --/-- --  never smoker   Uses seatbelts --  --/-- --  yes         Immunizations  NameDate Admin Mfg Trade Name Lot Number Route Inj VIS Given VIS Publication   Wcpqlmldg25/08/2020 PMC FLUARIX [id number] IM  12/08/2020 08/15/2019   Comments: NDC [id number]   [id number]/24/2015 MSD PNEUMOVAX 23 [id number] NE NE 12/12/2018 04/24/2015   Comments:    Tdap02/24/2015 [facility] ADACEL [id number] NE NE 12/12/2018 02/24/2015   Comments:          Review of Systems  · Constitutional  o Denies  o : fatigue, night sweats  · Eyes  o Denies  o : double vision, blurred vision  · HENT  o Denies  o : vertigo, recent head injury  · Breasts  o Denies  o : lumps, tenderness, swelling  · Cardiovascular  o Denies  o : chest pain, irregular heart beats  · Respiratory  o Denies  o : shortness of breath, productive cough  · Gastrointestinal  o Denies  o : nausea, vomiting  · Genitourinary  o Denies  o : dysuria, urinary retention  · Integument  o Denies  o : hair growth change, new skin lesions  · Neurologic  o Denies  o : altered mental status, seizures  · Musculoskeletal  o Denies  o : joint swelling, limitation of motion  · Endocrine  o Denies  o : cold intolerance, heat intolerance  · Psychiatric  o Denies  o : hallucinations, delusions  · Heme-Lymph  o Denies  o : petechiae, lymph node enlargement or  "tenderness  · Allergic-Immunologic  o Denies  o : frequent illnesses      Vitals  Date Time BP Position Site L\R Cuff Size HR RR TEMP (F) WT  HT  BMI kg/m2 BSA m2 O2 Sat FR L/min FiO2 HC       03/02/2021 09:16 /72 Sitting    89 - R  96.9 175lbs 16oz 5'  1\" 33.25 1.85 98 %  21%          Physical Examination  · Constitutional  o Appearance  o : alert, in no acute distress  · Head and Face  o HEENT  o : hearing is normal for conversation  · Eyes  o Conjunctivae  o : conjunctivae normal  · Neck  o Inspection/Palpation  o : supple  o Thyroid  o : no thyromegaly  · Respiratory  o Respiratory Effort  o : breathing unlabored  o Auscultation of Lungs  o : clear to ascultation  · Cardiovascular  o Heart  o :   § Auscultation of Heart  § : regular rate and rhythm  o Peripheral Vascular System  o :   § Extremities  § : no edema  · Musculoskeletal  o General  o :   § General Musculoskeletal  § : no joint swelling appreciated   · Skin and Subcutaneous Tissue  o General Inspection  o : no rash appreciated   · Neurologic  o Gait and Station  o :   § Gait Screening  § : normal gait  · Psychiatric  o Mood and Affect  o : mood normal, affect appropriate  · Left DM Foot Exam  o Sensation  o : normal sensory exam perceptible to 10-gram nylon monofilament exam (5/5) and light touch.  o Visual Inspection  o : visual inspection is normal with no signs of breakdown, ulcerations or deformities unless otherwise noted.   o Vascular  o : palpable dorsalis pedis and posterior tibialis pulses present, normal capillary refill  · Right DM Foot Exam  o Sensation  o : normal sensory exam perceptible to 10-gram nylon monofilament exam (5/5) and light touch.  o Visual Inspection  o : visual inspection is normal with no signs of breakdown, ulcerations or deformities unless otherwise noted.   o Vascular  o : palpable dorsalis pedis and posterior tibialis pulses present, normal capillary refill          Assessment  · Hypertension, Benign " Essential     401.1/I10    A.) Rx refilled as noted.     · Hypothyroid     244.9/E03.9    A.) Thyroid function labs as noted. Levothyroxine refilled as noted.     · Non-insulin dependent type 2 diabetes mellitus     250.00/E11.9    A.) Will order recently completed labs from nephrology. Will remain on current ex regimen. Lipid panel as noted. Diabetic foot exam completed here in office. Schedule dilated retinal exam. Rx refilled as noted.    · Screening for breast cancer     V76.10/Z12.39    A.) [facility].     · Screening for colon cancer     V76.51/Z12.11    A.) Uploaded to chart.       Problems Reconciled  Plan  · Orders  o Lipid Panel [facility] (06640) - 250.00/E11.9 - 03/02/2021  o ACO-19: Colorectal cancer screening results documented and reviewed (3017F) - V76.51/Z12.11 - 03/02/2021  o ACO-39: Current medications updated and reviewed (1159F, ) - - 03/02/2021  o Diabetic Foot (Motor and Sensory) Exam Completed [facility] (, , 2028F) - 250.00/E11.9 - 03/02/2021  o TSH + free t4 (12292, 93149) - 244.9/E03.9 - 03/02/2021  · Medications  o anastrozole 1 mg oral tablet   SIG: TAKE 1 TABLET DAILY for 90 days   DISP: (90) Tablet with 1 refills  Refilled on 03/02/2021     o Effexor XR 75 mg oral capsule,extended release 24hr   SIG: take 3 capsules by oral route daily for 60 days   DISP: (180) Capsule with 1 refills  Refilled on 03/02/2021     o Invokana 100 mg oral tablet   SIG: TAKE 1 TABLET DAILY BEFORE THE FIRST MEAL OF THE DAY for 90 days   DISP: (90) Tablet with 1 refills  Refilled on 03/02/2021     o Januvia 100 mg oral tablet   SIG: take 1 tablet (100 mg) by oral route once daily for 90 days   DISP: (90) Tablet with 1 refills  Refilled on 03/02/2021     o levothyroxine 125 mcg oral tablet   SIG: TAKE 1 TABLET DAILY 30 MINUTES BEFORE BREAKFAST ON AN EMPTY STOMACH for 90 days   DISP: (90) Tablet with 1 refills  Refilled on 03/02/2021     o rosuvastatin 40 mg oral tablet   SIG: TAKE 1 TABLET  DAILY   DISP: (90) Tablet with 1 refills  Refilled on 03/02/2021     o telmisartan 40 mg oral tablet   SIG: TAKE 1 TABLET DAILY   DISP: (90) Tablet with 1 refills  Refilled on 03/02/2021     o Medications have been Reconciled  o Transition of Care or Provider Policy  · Instructions  o Take all medications as prescribed/directed.  o Patient was educated/instructed on their diagnosis, treatment and medications prior to discharge from the clinic today.            Electronically Signed by: Jesica Alejandra DO - on March 2, 2021 09:48:45 AM

## 2021-05-07 NOTE — PROGRESS NOTES
Progress Note      Patient Name: Fiona Portillo   Patient ID: [id number]   Sex: Female   YOB: 1965        Visit Date: August 10, 2018    Provider: Charmaine Mena MD   Location: [facility]   Location Address: [address] [place] [address]  [id number]   Location Phone: [phone]          Chief Complaint     New patient referrals       History Of Present Illness  Fiona Portillo is a 53 year old /White female who presents for evaluation and treatment of:      She has diabetes and sees Dr. Briones for nephrology.  She is at the beginning stage of kidney issues.  She is doing a 24 hr urine for creatinine/protein.  Her creatinine was elevated a little.  She has hypothyroid.  It has been up and down.  She was increased to 112.  She has been up to 125.  Previously she was as low as 88.  An order is need to do a US of thyroid.  her energy level is draggy on the 112 dose.  It was changed 2 weeks ago.  She had lumpetomy 3 years ago. She was seeing Dr. Gilliam so she will see Gloria at [facility]  Her diabetes is well controlled now but went up when she had a Uti.  Today was 110 and yesterday 115.  She has Vit D deficiency  She will need repeat bloodwork in November.  Her last A1C was 7.0.    She went to the eye doctor yesterday.  She has calcium below her eye and she was referred   She needs refills on Crestor but she isn't sure what the dose is.  She will bring the bottle by the office.  She had the flu and pneumonia vaccine last October.   She occasional has numbness and tingling in her feet occasionally.  She is referred to go to Dr. Ibanez.  The R foot is drier than the L.  Her feet go numb if she sits very long.   No history of anemia  She has 2 sons.  One has Asperger's and the other one has autism.  She had a bone density that was normal.    She had a colonoscopy which was normal  4/2017.       Past Medical History  Disease Name Date Onset Notes   Breast Cancer --  --     Depression --  --    Diabetes mellitus, type 2 --  --    Hyperlipidemia --  --    Hypertension, Benign Essential --  --    Hypothyroid --  --    Sleep apnea --  --          Past Surgical History  Procedure Name Date Notes   Breast Lumpectomy --  --    Tonsillectomy --  --    Tubal ligation --  --          Medication List  Name Date Started Instructions   ergocalciferol (vitamin D2) 50,000 unit oral capsule  take 1 capsule by oral route once daily   FreeStyle Lancets 28 gauge miscellaneous misc  use as directed   Janumet 50-1,000 mg oral tablet  take 1 tablet by oral route 2 times per day with meals   levothyroxine 112 mcg oral tablet  take 1 tablet (112 mcg) by oral route once daily   telmisartan 40 mg oral tablet  take 1 tablet (40 mg) by oral route once daily         Allergy List  Allergen Name Date Reaction Notes   lisinopril --  --  --          Family Medical History  Disease Name Relative/Age Notes   DM Type II / Father; Mother; Grandmother (paternal); Sister    Father/     Grandmother (paternal)/     Mother/     Sister/    Family History Of Breast Cancer / Mother    Mother/    Father, Grandfather, or Brother developed Heart Disease before the age of 65 / --    Hyperlipidemia / Father; Mother    Father/     Mother/    Hypertension / Mother    Mother/    Mother, Grandmother, or Sister developed Heart Disease before the age of 65 / --          Social History  Finding Status Start/Stop Quantity Notes   Alcohol Former --/-- --  drank alcohol in the past, 7 or less drinks per week   Exercises regularly --  --/-- --  1-2 times per week   Recreational Drug Use Never --/-- --  never used   Tobacco Never --/-- --  never smoker   Uses seatbelts --  --/-- --  yes         Vitals  Date Time BP Position Site L\R Cuff Size HR RR TEMP(F) WT  HT  BMI kg/m2 BSA m2 O2 Sat        08/10/2018 01:17 /80 Sitting    56 - R  97.8 178lbs 0oz    95 %           Physical Examination  · Constitutional  o Appearance  o : well  developed, well-nourished, in no acute distress  · Eyes  o Conjunctivae  o : conjunctivae normal  · Neck  o Inspection/Palpation  o : supple  o Thyroid  o : no thyromegaly  · Respiratory  o Respiratory Effort  o : breathing unlabored  o Auscultation of Lungs  o : clear to ascultation  · Cardiovascular  o Heart  o :   § Auscultation of Heart  § : regular rate and rhythm  o Peripheral Vascular System  o :   § Extremities  § : no edema  · Lymphatic  o Neck  o : no lymphadenopathy present  · Musculoskeletal  o General  o :   § General Musculoskeletal  § : No joint swelling or deformity., Muscle tone, strength, and development grossly normal.  · Skin and Subcutaneous Tissue  o General Inspection  o : no lesions present, no areas of discoloration, skin turgor normal, texture normal  · Neurologic  o Gait and Station  o :   § Gait Screening  § : normal gait  · Psychiatric  o Mood and Affect  o : mood normal, affect appropriate          Assessment  · Diabetes mellitus, type 2     250.00/E11.9  · Essential hypertension     401.9/I10  · Hypothyroidism     244.9/E03.9  · Vitamin D deficiency     268.9/E55.9  · Breast cancer     174.9/C50.919      Plan  · Orders  o ACO-39: Current medications updated and reviewed () - - 08/10/2018  o US Thyroid. (30369) - 244.9/E03.9 - 08/10/2018  o ONCOLOGY / HEMATOLOGY CONSULTATION (HEMOC) - 174.9/C50.919 - 08/10/2018  · Instructions  o Patient was educated/instructed on their diagnosis, treatment and medications prior to discharge from the clinic today.            Electronically Signed by: Charmaine Mena MD -Author on August 10, 2018 01:52:20 PM

## 2021-05-07 NOTE — PROGRESS NOTES
Progress Note      Patient Name: Fiona Portillo   Patient ID: [id number]   Sex: Female   YOB: 1965        Visit Date: September 23, 2019    Provider: Jesica Alejandra DO   Location: [facility]   Location Address: [address] [place] [address] [place]  [address]   Location Phone: [phone]          Chief Complaint     Right side abdominal pain.       History Of Present Illness  Fiona Portillo is a 54 year old /White female who presents for evaluation and treatment of:      A.) RUQ pain : The patient presents with complaints of right upper quadrant pain x 3 months. She reports an intermittent pain and also notes that she also experiences pain of her lower right abdominal wall, as well as her inguinal region. She also reports an intermittent sharp pain radiating to her shoulder blade. No hx of abdominal surgery per patient. She reports previous episodes of nausea with her sxs. She denies episodes of emesis or diarrhea. She denies changes in her sxs with the ingestion of food. She reports a recent 5lb weight loss over the last 3 months. She denies a decrease in appetite. She denies fevers or chills associated with her sxs.       Past Medical History  Disease Name Date Onset Notes   Breast Cancer 10/2015 --    Depression --  --    Diabetes mellitus, type 2 --  --    Hyperlipidemia --  --    Hypertension, Benign Essential --  --    Hypothyroid --  --    Sleep apnea --  --          Past Surgical History  Procedure Name Date Notes   Breast Lumpectomy --  --    Tonsillectomy --  --    Tubal ligation --  1994         Medication List  Name Date Started Instructions   anastrozole 1 mg oral tablet 03/11/2019 take 1 tablet (1 mg) by oral route once daily for 90 days   Effexor XR 75 mg oral capsule,extended release 24hr 12/12/2018 take 3 capsules (225 mg) by oral route once daily Start with 1 pill x 3 days , then 2 pills x 3 days then 3 pills qd thereafter   ergocalciferol (vitamin D2)  50,000 unit oral capsule 10/08/2018 take 1 capsule by oral route once daily for 90 days   fexofenadine 180 mg oral tablet  take 1 tablet (180 mg) by oral route once daily   FreeStyle Lancets 28 gauge miscellaneous misc  use as directed   Januvia 50 mg oral tablet 08/12/2019 take 1 tablet (50 mg) by oral route once daily for 90 days   krill oil-omega-3-dha-epa 300-90 (27-45) mg oral capsule  --    levothyroxine 112 mcg oral tablet 08/12/2019 take 1 tablet (112 mcg) by oral route once daily for 90 days   metformin 500 mg oral tablet 08/12/2019 take 1 tablet (500 mg) by oral route 2 times per day with morning and evening meals for 90 days   rosuvastatin 40 mg oral tablet 03/11/2019 take 1 tablet (40 mg) by oral route once daily for 90 days   telmisartan 40 mg oral tablet 03/11/2019 take 1 tablet (40 mg) by oral route once daily for 90 days         Allergy List  Allergen Name Date Reaction Notes   lisinopril --  --  --        Allergies Reconciled  Family Medical History  Disease Name Relative/Age Notes   DM Type II Father/  Grandmother (paternal)/  Mother/  Sister/   Father; Mother; Grandmother (paternal); Sister   Hyperlipidemia Father/  Mother/   Father; Mother   Hypertension Mother/   Mother   Family History Of Breast Cancer Mother/   Mother   Mother, Grandmother, or Sister developed Heart Disease before the age of 65  --    Father, Grandfather, or Brother developed Heart Disease before the age of 65  --          Social History  Finding Status Start/Stop Quantity Notes   Alcohol Former --/-- --  drank alcohol in the past, 7 or less drinks per week   Exercises regularly --  --/-- --  1-2 times per week   Recreational Drug Use Never --/-- --  never used   Tobacco Never --/-- --  never smoker   Uses seatbelts --  --/-- --  yes         Immunizations  NameDate Admin Mfg Trade Name Lot Number Route Inj VIS Given VIS Publication   Xugsfmrky17/08/2018 [facility] FLUARIX [id number] IM  10/08/2018 08/07/2015   Comments: ndc [id number]    Joxpjjjwb5688/24/2015 MSD PNEUMOVAX 23 [id number] NE NE 12/12/2018 04/24/2015   Comments:    Tdap02/24/2015 [facility] ADACEL [id number] NE NE 12/12/2018 02/24/2015   Comments:          Review of Systems  · Constitutional  o Admits  o : weight loss (5 lbs during the past 3 months)  o Denies  o : fatigue, fever, chills, night sweats, body aches, loss of appetite  · Eyes  o Denies  o : discharge from eye, eye discomfort, eye pain, blurred or double vision, blurred vision  · HENT  o Denies  o : headaches, vertigo, lightheadedness, sinus pain, nasal congestion, nasal discharge  · Cardiovascular  o Denies  o : chest pain, chest discomfort, palpitations  · Respiratory  o Denies  o : shortness of breath, wheezing, cough  · Gastrointestinal  o Admits  o : nausea ( previous episodes of nausea), abdominal pain (RUQ)   o Denies  o : vomiting, diarrhea, constipation, loss of appetite, dysphagia, heartburn, hematemesis, excessive belching, blood in stools  · Genitourinary  o Denies  o : urgency, frequency, dysuria  · Integument  o Denies  o : rash, itching  · Neurologic  o Denies  o : altered mental status, muscular weakness  · Musculoskeletal  o Denies  o : joint pain, joint swelling  · Psychiatric  o Denies  o : delusions, feeling confused  · Heme-Lymph  o Denies  o : lightheadedness, easy bleeding, easy bruising, bleeding or bruising tendency, anemia      Vitals  Date Time BP Position Site L\R Cuff Size HR RR TEMP (F) WT  HT  BMI kg/m2 BSA m2 O2 Sat        09/23/2019 09:32 /80 Sitting    102 - R  97.2 173lbs 6oz    99 %          Physical Examination  · Constitutional  o Appearance  o : well-nourished, well developed  · Head and Face  o HEENT  o : Unremarkable  · Eyes  o Conjunctivae  o : conjunctivae normal  o Pupils and Irises  o : pupils equal and round  · Neck  o Inspection/Palpation  o : supple  · Respiratory  o Respiratory Effort  o : breathing unlabored  o Auscultation of Lungs  o : clear to  ascultation  · Cardiovascular  o Heart  o :   § Auscultation of Heart  § : normal rhythm, no murmurs present, no pericardial friction rub  o Peripheral Vascular System  o :   § Extremities  § : no edema  · Gastrointestinal  o Abdominal Examination  o :   § Abdomen  § : hypoactive bowel sounds x 2, mild tenderness with palpation upon deep inhalation of RUQ, no masses appreciated, no hernias appreciated, no hepatomegaly   · Lymphatic  o Neck  o : no lymphadenopathy present  · Musculoskeletal  o General  o :   § General Musculoskeletal  § : No joint swelling or deformity.  · Skin and Subcutaneous Tissue  o General Inspection  o : no rashes present  · Neurologic  o Gait and Station  o :   § Gait Screening  § : normal gait  · Psychiatric  o Mood and Affect  o : mood normal, affect appropriate              Assessment  · Right upper quadrant abdominal pain     789.01/R10.11    A.) ? Gallbladder vs other etiology, will order labs as noted below to assess status of liver, gallbladder and pancreas, patient will follow up on 9/27/19 for discussion of labs and plan moving forward.         Plan  · Orders  o CBC with Auto Diff [facility] (50158) - 789.01/R10.11 - 09/23/2019  o IOP - Urinalysis without Microscopy (Clinitek) [facility] (05525) - 789.01/R10.11 - 09/23/2019   GLU 100MG/DL FERMIN NEG KET NEG SG 1.020 BLO TRACE PH 5.0 PRO 30 MG/DL URO 0.2 NIT NEG LUIS NEG  o ACO-39: Current medications updated and reviewed () - - 09/23/2019  o CMP [facility] ([id number]) - 789.01/R10.11 - 09/23/2019  o Amylase (93149) - 789.01/R10.11 - 09/23/2019  o Lipase (13706) - 789.01/R10.11 - 09/23/2019  · Medications  o Medications have been Reconciled  o Transition of Care or Provider Policy  · Instructions  o Patient was educated/instructed on their diagnosis, treatment and medications prior to discharge from the clinic today.  o Return on 9/27/19 as noted above.            Electronically Signed by: Jesica Alejandra DO -Author on September 23, 2019 11:51:19 AM

## 2021-05-07 NOTE — PROGRESS NOTES
Progress Note      Patient Name: Fiona Portillo   Patient ID: [id number]   Sex: Female   YOB: 1965    Referring Provider: Chance Pan MD    Visit Date: September 27, 2019    Provider: Jesica Alejandra DO   Location: [facility]   Location Address: [address] [place] [address] [place]  [address]   Location Phone: [phone]          Chief Complaint     Follow up to labs and refill rosuvastatin.       History Of Present Illness  Fiona Portillo is a 54 year old /White female who presents for evaluation and treatment of:      A.) F/U RUQ pain : The patient presents for follow-up regarding right upper quadrant pain. Liver etiology was not suspected during her last visit. She reports that her pain has improved.  During her last visit labs were ordered including amylase, lipase, CBC and CMP. She presents today reporting an intermittent pain that is not bothersome.    B.) Lab results : Review of the patient's most recent labs revealed a slightly elevated lipase and a glucose noted at 335.  Patient does have a history of diabetes and reports that she is compliant with her medication. Her creatinine was also noted as elevated.    C.) Rx refill : The patient is also requesting a refill of Rosuvastatin.       Past Medical History  Disease Name Date Onset Notes   Breast Cancer 10/2015 --    Depression --  --    Diabetes mellitus, type 2 --  --    Hyperlipidemia --  --    Hypertension, Benign Essential --  --    Hypothyroid --  --    Sleep apnea --  --          Past Surgical History  Procedure Name Date Notes   Breast Lumpectomy --  --    Tonsillectomy --  --    Tubal ligation --  1994         Medication List  Name Date Started Instructions   anastrozole 1 mg oral tablet 03/11/2019 take 1 tablet (1 mg) by oral route once daily for 90 days   Effexor XR 75 mg oral capsule,extended release 24hr 12/12/2018 take 3 capsules (225 mg) by oral route once daily Start with 1 pill x 3 days ,  then 2 pills x 3 days then 3 pills qd thereafter   ergocalciferol (vitamin D2) 50,000 unit oral capsule 10/08/2018 take 1 capsule by oral route once daily for 90 days   fexofenadine 180 mg oral tablet  take 1 tablet (180 mg) by oral route once daily   FreeStyle Lancets 28 gauge miscellaneous misc  use as directed   Januvia 50 mg oral tablet 08/12/2019 take 1 tablet (50 mg) by oral route once daily for 90 days   levothyroxine 112 mcg oral tablet 08/12/2019 take 1 tablet (112 mcg) by oral route once daily for 90 days   metformin 500 mg oral tablet 08/12/2019 take 1 tablet (500 mg) by oral route 2 times per day with morning and evening meals for 90 days   rosuvastatin 40 mg oral tablet 09/27/2019 take 1 tablet (40 mg) by oral route once daily for 90 days   telmisartan 40 mg oral tablet 03/11/2019 take 1 tablet (40 mg) by oral route once daily for 90 days         Allergy List  Allergen Name Date Reaction Notes   lisinopril --  --  --          Family Medical History  Disease Name Relative/Age Notes   DM Type II Father/  Grandmother (paternal)/  Mother/  Sister/   Father; Mother; Grandmother (paternal); Sister   Hyperlipidemia Father/  Mother/   Father; Mother   Hypertension Mother/   Mother   Family History Of Breast Cancer Mother/   Mother   Mother, Grandmother, or Sister developed Heart Disease before the age of 65  --    Father, Grandfather, or Brother developed Heart Disease before the age of 65  --          Social History  Finding Status Start/Stop Quantity Notes   Alcohol Former --/-- --  drank alcohol in the past, 7 or less drinks per week   Exercises regularly --  --/-- --  1-2 times per week   Recreational Drug Use Never --/-- --  never used   Tobacco Never --/-- --  never smoker   Uses seatbelts --  --/-- --  yes         Immunizations  NameDate Admin Mfg Trade Name Lot Number Route Inj VIS Given VIS Publication   Xnruicvvi00/08/2018 [facility] FLUARIX [id number] IM  10/08/2018 08/07/2015   Comments: ndc [id number]  "  Qpdoyswbv9147/24/2015 MSD PNEUMOVAX 23 [id number] NE NE 12/12/2018 04/24/2015   Comments:    Tdap02/24/2015 [facility] ADACEL [id number] NE NE 12/12/2018 02/24/2015   Comments:          Review of Systems  · Constitutional  o Admits  o : good general health lately  o Denies  o : fatigue, recent weight changes   · Eyes  o Denies  o : blurred or double vision  · HENT  o Denies  o : hearing loss or ringing, chronic sinus problem, swollen glands in neck  · Cardiovascular  o Denies  o : chest pain, palpitations (fast, fluttering, or skipping beats), swelling (feet, ankles, hands), shortness of breath while walking or lying flat  · Respiratory  o Denies  o : shortness of breath, asthma or wheezing, COPD  · Gastrointestinal  o Admits  o : improved RUQ abdominal pain  o Denies  o : ulcers, nausea or vomiting  · Integument  o Denies  o : rash, itching  · Neurologic  o Denies  o : lightheaded or dizzy, stroke, headaches  · Musculoskeletal  o Denies  o : joint pain, back pain  · Endocrine  o Admits  o : diabetes, excessive thirst or urination  o Denies  o : polyuria, polydipsia  · Heme-Lymph  o Denies  o : bleeding or bruising tendency, anemia      Vitals  Date Time BP Position Site L\R Cuff Size HR RR TEMP (F) WT  HT  BMI kg/m2 BSA m2 O2 Sat        09/27/2019 01:53 /70 Sitting    107 - R  97.2 174lbs 2oz 5'  1.5\" 32.37 1.85 97 %          Physical Examination  · Constitutional  o Appearance  o : well developed, well-nourished, in no acute distress  · Head and Face  o HEENT  o : Unremarkable  · Eyes  o Conjunctivae  o : conjunctivae normal  o Pupils and Irises  o : pupils equal and round  · Neck  o Inspection/Palpation  o : supple  · Respiratory  o Respiratory Effort  o : breathing unlabored  o Auscultation of Lungs  o : clear to ascultation  · Cardiovascular  o Heart  o :   § Auscultation of Heart  § : regular rate and rhythm  o Peripheral Vascular System  o :   § Extremities  § : no edema  · Lymphatic  o Neck  o : no " lymphadenopathy present  · Musculoskeletal  o General  o :   § General Musculoskeletal  § : No joint swelling or deformity.  · Skin and Subcutaneous Tissue  o General Inspection  o : no rashes present  · Neurologic  o Gait and Station  o :   § Gait Screening  § : normal gait  · Psychiatric  o Mood and Affect  o : mood normal, affect appropriate              Assessment  · Diabetes mellitus, type 2     250.00/E11.9    A.) Hemoglobin A1c ordered due to recent glucose level noted on CMP, will await results and addend pt's diabetic medication as indicated.     · Abnormal kidney function study     794.4/R94.4    A.) Kidney function blood testing today; if creatinine remains elevated or trending upward, will refer to nephrology for evaluation and recommendations.     · Elevated lipase     790.5/R74.8    A.) Will recheck in 1 month.        Refill sent for Rosuvastatin.       Plan  · Orders  o Hgb A1c [facility] (09661) - 250.00/E11.9 - 09/27/2019  o ACO-39: Current medications updated and reviewed () - - 09/27/2019  o Kidney function test (80021) - 794.4/R94.4 - 09/27/2019  · Medications  o Medications have been Reconciled  o Transition of Care or Provider Policy  · Instructions  o Take all medications as prescribed/directed.  o Patient was educated/instructed on their diagnosis, treatment and medications prior to discharge from the clinic today.            Electronically Signed by: Jesica Alejandra DO - on October 9, 2019 09:50:13 AM

## 2021-05-07 NOTE — PROGRESS NOTES
Progress Note      Patient Name: Fiona Portillo   Patient ID: [id number]   Sex: Female   YOB: 1965        Visit Date: January 17, 2020    Provider: Jesica Alejandra DO   Location: [facility]   Location Address: [address] [place] [address] [place]  [address]   Location Phone: [phone]          Chief Complaint     F/U LABS AND DISCUSSION OF METFORMIN       History Of Present Illness  Fiona Portillo is a 54 year old /White female who presents for evaluation and treatment of:      1.) F/U LABS : Patient presents with a past medical history significant for diabetes mellitus type 2 as well as CKD stage III.  She is followed by nephrology and was most recently seen yesterday. She presents today with her most recent A1c noted at 7.5%. Patient is currently prescribed Metformin, where it has been advised by nephrology that the patient discontinue the medication due to her kidney function. She continues to take Januvia 50 mg daily. Her diabetic foot exam was performed during her last visit.       Past Medical History  Disease Name Date Onset Notes   Breast Cancer 10/2015 --    CKD (chronic kidney disease), stage III --  --    Depression --  --    Diabetes mellitus, type 2 --  --    Hyperlipidemia --  --    Hypertension, Benign Essential --  --    Hypothyroid --  --    Sleep apnea --  --          Past Surgical History  Procedure Name Date Notes   Breast Lumpectomy --  --    Colonoscopy 02/08/17 --    Tonsillectomy --  --    Tubal ligation --  1994         Medication List  Name Date Started Instructions   anastrozole 1 mg oral tablet 01/10/2020 take 1 tablet (1 mg) by oral route once daily for 90 days   Effexor XR 75 mg oral capsule,extended release 24hr 12/16/2019 take 3 capsules (225 mg) by oral route once daily Start with 1 pill x 3 days , then 2 pills x 3 days then 3 pills qd thereafter   fexofenadine 180 mg oral tablet  take 1 tablet (180 mg) by oral route once daily    FreeStyle Lancets 28 gauge miscellaneous misc  use as directed   Januvia 50 mg oral tablet 08/12/2019 take 1 tablet (50 mg) by oral route once daily for 90 days   levothyroxine 112 mcg oral tablet 08/12/2019 take 1 tablet (112 mcg) by oral route once daily for 90 days   rosuvastatin 40 mg oral tablet 01/10/2020 take 1 tablet (40 mg) by oral route once daily for 90 days   telmisartan 40 mg oral tablet 01/10/2020 take 1 tablet (40 mg) by oral route once daily for 90 days   Vitamin D2 50,000 unit oral capsule  take 1 capsule (50,000 unit) by oral route once weekly         Allergy List  Allergen Name Date Reaction Notes   lisinopril --  --  --          Family Medical History  Disease Name Relative/Age Notes   DM Type II Father/  Grandmother (paternal)/  Mother/  Sister/   Father; Mother; Grandmother (paternal); Sister   Hyperlipidemia Father/  Mother/   Father; Mother   Hypertension Mother/   Mother   Family History Of Breast Cancer Mother/   Mother   Mother, Grandmother, or Sister developed Heart Disease before the age of 65  --    Father, Grandfather, or Brother developed Heart Disease before the age of 65  --          Social History  Finding Status Start/Stop Quantity Notes   Alcohol Former --/-- --  drank alcohol in the past, 7 or less drinks per week   Exercises regularly --  --/-- --  1-2 times per week   Recreational Drug Use Never --/-- --  never used   Tobacco Never --/-- --  never smoker   Uses seatbelts --  --/-- --  yes         Immunizations  NameDate Admin Mfg Trade Name Lot Number Route Inj VIS Given VIS Publication   Njdtqvilb70/10/2020 [facility] FLUZONE [id number] IM RD 01/10/2020    Comments: [id number]   Wmxpjkkeo44/08/2018 [facility] FLUARIX [id number] IM  10/08/2018 08/07/2015   Comments: ndc [id number]   Vswbtklvq1088/24/2015 MSD PNEUMOVAX 23 [id number] NE NE 12/12/2018 04/24/2015   Comments:    Tdap02/24/2015 [facility] ADACEL [id number] NE NE 12/12/2018 02/24/2015   Comments:          Review of  Systems  · Constitutional  o Denies  o : fatigue, night sweats  · Eyes  o Denies  o : double vision, blurred vision  · HENT  o Denies  o : vertigo, recent head injury  · Cardiovascular  o Denies  o : chest pain, irregular heart beats  · Respiratory  o Denies  o : shortness of breath, productive cough  · Gastrointestinal  o Denies  o : nausea, vomiting  · Genitourinary  o Denies  o : dysuria, urinary retention  · Integument  o Denies  o : hair growth change, new skin lesions  · Neurologic  o Denies  o : altered mental status, seizures  · Musculoskeletal  o Denies  o : joint swelling, limitation of motion  · Endocrine  o Denies  o : cold intolerance, heat intolerance  · Heme-Lymph  o Denies  o : petechiae, lymph node enlargement or tenderness  · Allergic-Immunologic  o Denies  o : frequent illnesses      Vitals  Date Time BP Position Site L\R Cuff Size HR RR TEMP (F) WT  HT  BMI kg/m2 BSA m2 O2 Sat HC       01/17/2020 03:16 /80 Sitting    94 - R  97.6 174lbs 4oz    99 %          Physical Examination  · Constitutional  o Appearance  o : obese, well developed, no obvious deformities present  · Head and Face  o HEENT  o : Unremarkable  · Eyes  o Conjunctivae  o : conjunctivae normal  o Pupils and Irises  o : pupils equal and round  · Neck  o Inspection/Palpation  o : supple  · Respiratory  o Respiratory Effort  o : breathing unlabored  o Auscultation of Lungs  o : clear to ascultation  · Cardiovascular  o Heart  o :   § Auscultation of Heart  § : regular rate and rhythm  o Peripheral Vascular System  o :   § Extremities  § : no edema  · Musculoskeletal  o General  o :   § General Musculoskeletal  § : no joint swelling appreciated  · Skin and Subcutaneous Tissue  o General Inspection  o : no rashes appreciated  · Neurologic  o Gait and Station  o :   § Gait Screening  § : normal gait  · Psychiatric  o Mood and Affect  o : mood normal, affect appropriate              Assessment  · Diabetes mellitus, type  2     250.00/E11.9    A.) Patient will continue Januvia at current dose; will discontinue Metformin; will start patient on Invokana as noted below; healthy diet and exercise recommended; adequate blood pressure control recommended; she will continue to follow-up with nephrology regarding her chronic kidney disease; she will return in 3 months for a reevaluation.        Plan  · Orders  o ACO-39: Current medications updated and reviewed () - - 01/17/2020  o Diabetic Foot (Motor and Sensory) Exam Completed [facility] (, , 2028F) - 250.00/E11.9 - 01/17/2020  · Medications  o Invokana 100 mg oral tablet   SIG: take 1 tablet (100 mg) by oral route once daily before the first meal of the day for 90 days   DISP: (90) tablets with 0 refills  Prescribed on 01/17/2020     o metformin 500 mg oral tablet   SIG: take 1 tablet (500 mg) by oral route 2 times per day with morning and evening meals for 90 days   DISP: (180) tablets with 1 refills  Discontinued on 01/17/2020     o Medications have been Reconciled  o Transition of Care or Provider Policy  · Instructions  o Continue blood sugar monitoring daily and record. Bring your log to office visits. Call the office for readings below 70 and above 250 or any complications.  o Daily foot care. Avoid walking barefoot. Annual Dilated Eye Exam.  o Discussed with patient blood pressure monitoring, hemoglobin A1C levels need to be below 7.0, and LDL (Lipid) goals below 70.  o Take all medications as prescribed/directed.  o Patient was educated/instructed on their diagnosis, treatment and medications prior to discharge from the clinic today.            Electronically Signed by: Jesica Alejandra DO - on January 17, 2020 04:20:26 PM

## 2021-05-07 NOTE — PROGRESS NOTES
Progress Note      Patient Name: Fiona Portillo   Patient ID: [id number]   Sex: Female   YOB: 1965        Visit Date: September 25, 2020    Provider: Jesica Alejandra DO   Location: [facility]   Location Address: [address] [place] [address] [place]  [address]   Location Phone: [phone]          Chief Complaint     Go over lab work       History Of Present Illness  Fiona Portillo is a 55 year old /White female who presents for evaluation and treatment of:      1.) NIDDM : A1C noted at 7.5% - increased from 7.0% when last checked. Patient currently is prescribed Invokana and Januvia. She denies any episodes of hypo- or hyperglycemia since her last visit.     2.) CKD : GFR and creatinine has improved since her last visit. She is followed by Dr. Briones. She is scheduled to be seen via telemedicine today at 11:00. She denies any urinary sxs.     3.) HYPOTHYROID : TSH noted at 5.470 - patient is prescribed Levothyroxine 125 mcg. She notes symptoms of hair loss - however she states 'I have pretty thick hair, so I'm not too worried about it.'       Past Medical History  Disease Name Date Onset Notes   Breast Cancer 10/2015 In remission - history of right lumpectomy - chemo + radiation    CKD (chronic kidney disease), stage III --  --    Depression --  --    Hyperlipidemia --  --    Hypertension, Benign Essential --  --    Hypothyroid --  --    Non-insulin dependent type 2 diabetes mellitus --  --    ANDRES on CPAP --  --          Past Surgical History  Procedure Name Date Notes   Breast Lumpectomy --  --    Colonoscopy 02/08/17 --    Tonsillectomy --  --    Tubal ligation --  1994         Medication List  Name Date Started Instructions   anastrozole 1 mg oral tablet 09/02/2020 TAKE 1 TABLET DAILY   Effexor XR 75 mg oral capsule,extended release 24hr 04/17/2020 take 3 capsules by oral route daily for 90 days   fexofenadine 180 mg oral tablet  take 1 tablet (180 mg) by oral route once daily    FreeStyle Lancets 28 gauge miscellaneous misc 07/28/2020 USE AS DIRECTED   FreeStyle Lite Strips miscellaneous strip 04/17/2020 Test BID   Invokana 100 mg oral tablet 09/18/2020 TAKE 1 TABLET DAILY BEFORE THE FIRST MEAL OF THE DAY   Januvia 50 mg oral tablet 09/25/2020 take 2 tablets by oral route daily for 90 days   levothyroxine 125 mcg oral tablet 06/03/2020 TAKE 1 TABLET DAILY 30 MINUTES BEFORE BREAKFAST ON AN EMPTY STOMACH   rosuvastatin 40 mg oral tablet 09/02/2020 TAKE 1 TABLET DAILY   telmisartan 40 mg oral tablet 09/02/2020 TAKE 1 TABLET DAILY   Vitamin D2 50,000 unit oral capsule  take 1 capsule (50,000 unit) by oral route once weekly         Allergy List  Allergen Name Date Reaction Notes   lisinopril --  --  --        Allergies Reconciled  Family Medical History  Disease Name Relative/Age Notes   DM Type II Father/  Grandmother (paternal)/  Mother/  Sister/   Father; Mother; Grandmother (paternal); Sister   Hyperlipidemia Father/  Mother/   Father; Mother   Hypertension Mother/   Mother   Family History Of Breast Cancer Mother/   Mother   Mother, Grandmother, or Sister developed Heart Disease before the age of 65  --    Father, Grandfather, or Brother developed Heart Disease before the age of 65  --          Social History  Finding Status Start/Stop Quantity Notes   Alcohol Former --/-- --  drank alcohol in the past, 7 or less drinks per week   Exercises regularly --  --/-- --  1-2 times per week   Recreational Drug Use Never --/-- --  never used   Tobacco Never --/-- --  never smoker   Uses seatbelts --  --/-- --  yes         Immunizations  NameDate Admin Mfg Trade Name Lot Number Route Inj VIS Given VIS Publication   Epcmmjsit95/10/2020 PMC FLUZONE [id number] IM RD 01/10/2020    Comments: [id number]   Tazzzfeae71/08/2018 PMC FLUARIX [id number] IM  10/08/2018 08/07/2015   Comments: ndc [id number]   Gsqnzatfn9120/24/2015 MSD PNEUMOVAX 23 [id number] NE NE 12/12/2018 04/24/2015   Comments:     Tdap02/24/2015 [facility] ADACEL [id number] NE NE 12/12/2018 02/24/2015   Comments:          Review of Systems  · Constitutional  o Denies  o : fatigue, night sweats  · Eyes  o Denies  o : double vision, blurred vision  · HENT  o Denies  o : vertigo, recent head injury  · Cardiovascular  o Denies  o : chest pain, irregular heart beats  · Respiratory  o Denies  o : shortness of breath, productive cough  · Gastrointestinal  o Denies  o : nausea, vomiting  · Genitourinary  o Denies  o : dysuria, urinary retention  · Integument  o Denies  o : hair growth change, new skin lesions  · Neurologic  o Denies  o : altered mental status, seizures  · Musculoskeletal  o Denies  o : joint swelling, limitation of motion  · Endocrine  o Admits  o : loss of hair  o Denies  o : cold intolerance, heat intolerance  · Psychiatric  o Denies  o : hallucinations, delusions  · Heme-Lymph  o Denies  o : petechiae, lymph node enlargement or tenderness  · Allergic-Immunologic  o Denies  o : frequent illnesses      Vitals  Date Time BP Position Site L\R Cuff Size HR RR TEMP (F) WT  HT  BMI kg/m2 BSA m2 O2 Sat        09/25/2020 09:33 /70 Sitting    104 - R  97.5 176lbs 16oz    98 %          Physical Examination  · Constitutional  o Appearance  o : alert, in no acute distress  · Eyes  o Conjunctivae  o : conjunctivae normal  · Neck  o Inspection/Palpation  o : supple  · Respiratory  o Respiratory Effort  o : breathing unlabored  o Auscultation of Lungs  o : clear to ascultation  · Cardiovascular  o Heart  o :   § Auscultation of Heart  § : regular rate and rhythm  o Peripheral Vascular System  o :   § Extremities  § : no edema  · Lymphatic  o Neck  o : no lymphadenopathy present  · Skin and Subcutaneous Tissue  o General Inspection  o : no rash appreciated   · Neurologic  o Gait and Station  o :   § Gait Screening  § : normal gait  · Psychiatric  o Mood and Affect  o : mood normal, affect appropriate              Assessment  · Non-insulin  dependent type 2 diabetes mellitus     250.00/E11.9    A.) Will increase dose of Januvia as noted - continue Invokana at current dose - healthy diet and exercise advised - follow up in 3 months.     · Chronic kidney disease     585.9/N18.9    A.) Patient is followed by nephrology.     · Hypothyroid     244.9/E03.9    A.) Patient would like to remain on her current dose of Levothyroxine.       Problems Reconciled  Plan  · Orders  o ACO-39: Current medications updated and reviewed () - - 09/25/2020  · Medications  o Januvia 100 mg oral tablet   SIG: take 1 tablet (100 mg) by oral route once daily for 90 days   DISP: (90) tablets with 1 refills  Adjusted on 09/25/2020     o Medications have been Reconciled  o Transition of Care or Provider Policy  · Instructions  o Take all medications as prescribed/directed.  o Patient was educated/instructed on their diagnosis, treatment and medications prior to discharge from the clinic today.            Electronically Signed by: Jesica Alejandra DO - on September 25, 2020 01:32:19 PM

## 2021-05-07 NOTE — PROGRESS NOTES
Progress Note      Patient Name: Fiona Portillo   Patient ID: [id number]   Sex: Female   YOB: 1965        Visit Date: September 21, 2020    Provider: Jesica Alejandra DO   Location: [facility]   Location Address: [address] [place] [address] [place]  [address]   Location Phone: [phone]          Chief Complaint     PATIENT IS HERE FOR FASTING LAB WORK, HER SUGAR HAS BEEN HIGH FOR OVER A WEEK, HER LEFT EAR IS HURTING AND SHE NEEDS A REFERRAL.       History Of Present Illness  Fiona Portillo is a 55 year old /White female who presents for evaluation and treatment of:      1.) NIDDM : A1C - 4/2020 - 7.0%. Per patient - recent onset of 'high blood sugars' - both fasting and non-fasting. She reports blood glucose measured in the 150's - 250's. She denies any new inciting event. She denies a change in her diet. She denies any new medications.     2.) URI SXS : Patient presents with complaints of sinus congestion. She denies fevers or chills. She reports bilateral ear pain. She had been using Mucinex. She reports nasal drainage.     3.) CHRONIC KIDNEY DISEASE : Hx of stage 3 chronic kidney disease. Patient is followed by Dr. Briones. She would like a new referral placed.     4.) ANDRES W/ CPAP : Patient is followed by Dr. Meza. Recent titration of CPAP by sleep medicine per patient. She would like a new referral placed.     5.) HYPOTHYROIDISM : She denies any symptoms of hypo- or hyperthyroidism. She is due for thyroid labs.       Past Medical History  Disease Name Date Onset Notes   Breast Cancer 10/2015 In remission - history of right lumpectomy - chemo + radiation    CKD (chronic kidney disease), stage III --  --    Depression --  --    Hyperlipidemia --  --    Hypertension, Benign Essential --  --    Hypothyroid --  --    Non-insulin dependent type 2 diabetes mellitus --  --    ANDRES on CPAP --  --          Past Surgical History  Procedure Name Date Notes   Breast Lumpectomy --  --     Colonoscopy 02/08/17 --    Tonsillectomy --  --    Tubal ligation --  1994         Medication List  Name Date Started Instructions   anastrozole 1 mg oral tablet 09/02/2020 TAKE 1 TABLET DAILY   Effexor XR 75 mg oral capsule,extended release 24hr 04/17/2020 take 3 capsules by oral route daily for 90 days   fexofenadine 180 mg oral tablet  take 1 tablet (180 mg) by oral route once daily   FreeStyle Lancets 28 gauge miscellaneous misc 07/28/2020 USE AS DIRECTED   FreeStyle Lite Strips miscellaneous strip 04/17/2020 Test BID   Invokana 100 mg oral tablet 09/18/2020 TAKE 1 TABLET DAILY BEFORE THE FIRST MEAL OF THE DAY   Januvia 50 mg oral tablet 04/17/2020 take 1 tablet (50 mg) by oral route once daily for 90 days   levothyroxine 125 mcg oral tablet 06/03/2020 TAKE 1 TABLET DAILY 30 MINUTES BEFORE BREAKFAST ON AN EMPTY STOMACH   rosuvastatin 40 mg oral tablet 09/02/2020 TAKE 1 TABLET DAILY   telmisartan 40 mg oral tablet 09/02/2020 TAKE 1 TABLET DAILY   Vitamin D2 50,000 unit oral capsule  take 1 capsule (50,000 unit) by oral route once weekly         Allergy List  Allergen Name Date Reaction Notes   lisinopril --  --  --        Allergies Reconciled  Family Medical History  Disease Name Relative/Age Notes   DM Type II Father/  Grandmother (paternal)/  Mother/  Sister/   Father; Mother; Grandmother (paternal); Sister   Hyperlipidemia Father/  Mother/   Father; Mother   Hypertension Mother/   Mother   Family History Of Breast Cancer Mother/   Mother   Mother, Grandmother, or Sister developed Heart Disease before the age of 65  --    Father, Grandfather, or Brother developed Heart Disease before the age of 65  --          Social History  Finding Status Start/Stop Quantity Notes   Alcohol Former --/-- --  drank alcohol in the past, 7 or less drinks per week   Exercises regularly --  --/-- --  1-2 times per week   Recreational Drug Use Never --/-- --  never used   Tobacco Never --/-- --  never smoker   Uses seatbelts --   "--/-- --  yes         Immunizations  NameDate Admin Mfg Trade Name Lot Number Route Inj VIS Given VIS Publication   Hbdpmawbe78/10/2020 PMC FLUZONE [id number] IM RD 01/10/2020    Comments: [id number]   Hkszpdaaa69/08/2018 PMC FLUARIX [id number]   10/08/2018 08/07/2015   Comments: ndc [id number]   Npcssixsz8000/24/2015 MSD PNEUMOVAX 23 [id number] NE NE 12/12/2018 04/24/2015   Comments:    Tdap02/24/2015 [facility] ADACEL [id number] NE NE 12/12/2018 02/24/2015   Comments:          Review of Systems  · Constitutional  o Denies  o : fatigue, night sweats  · Eyes  o Denies  o : double vision, blurred vision  · HENT  o Admits  o : sinus congestion, ear pain  o Denies  o : vertigo, recent head injury  · Cardiovascular  o Denies  o : chest pain, irregular heart beats  · Respiratory  o Denies  o : shortness of breath, productive cough  · Gastrointestinal  o Denies  o : nausea, vomiting  · Genitourinary  o Denies  o : dysuria, urinary retention  · Integument  o Denies  o : hair growth change, new skin lesions  · Neurologic  o Denies  o : altered mental status, seizures  · Musculoskeletal  o Denies  o : joint swelling, limitation of motion  · Endocrine  o Denies  o : cold intolerance, heat intolerance  · Psychiatric  o Denies  o : delusions, difficulty sleeping  · Heme-Lymph  o Denies  o : petechiae, lymph node enlargement or tenderness  · Allergic-Immunologic  o Denies  o : frequent illnesses      Vitals  Date Time BP Position Site L\R Cuff Size HR RR TEMP (F) WT  HT  BMI kg/m2 BSA m2 O2 Sat        09/21/2020 08:23 /68 Sitting    97 - R  97.3 176lbs 4oz 5'  1.5\" 32.76 1.86 99 %          Physical Examination  · Constitutional  o Appearance  o : in no acute distress  · Eyes  o Conjunctivae  o : conjunctivae normal  · Ears, Nose, Mouth and Throat  o Ears  o :   § External Ears  § : appearance within normal limits, no lesions present  § Otoscopic Examination  § : Left TM effusion   § Hearing  § : intact to conversational voice both " ears  o Nose  o :   § External Nose  § : appearance normal  § Intranasal Exam  § : bilateral mucoid drainage   o Oral Cavity  o :   § Oral Mucosa  § : oral mucosa normal without pallor or cyanosis  § Lips  § : lip appearance normal  § Teeth  § : normal dentition for age  § Gums  § : gums pink, non-swollen, no bleeding present  § Tongue  § : tongue appearance normal  § Palate  § : hard palate normal, soft palate appearance normal  o Throat  o :   § Oropharynx  § : no inflammation or lesions present  · Neck  o Inspection/Palpation  o : supple  · Respiratory  o Respiratory Effort  o : breathing unlabored  o Auscultation of Lungs  o : clear to ascultation  · Cardiovascular  o Heart  o :   § Auscultation of Heart  § : regular rate and rhythm  · Lymphatic  o Neck  o : no lymphadenopathy present  · Skin and Subcutaneous Tissue  o General Inspection  o : normal tone  · Psychiatric  o Mood and Affect  o : mood normal, affect appropriate          Assessment  · Non-insulin dependent type 2 diabetes mellitus     250.00/E11.9    A.) Diabetes panel as noted - per results will make adjustments to patient's diabetic medications.     · Hypothyroid     244.9/E03.9    A.) Thyroid labs as noted.    · ANDRES on CPAP       Obstructive sleep apnea (adult) (pediatric)     327.23/G47.33  Dependence on other enabling machines and devices     327.23/Z99.89    A.) Referral placed as requested.     · Acute sinusitis     461.9/J01.90    A.) Likely allergic - Flonase as noted below.     · Chronic kidney disease     585.9/N18.9    A.) Referral to nephrology as noted.       Problems Reconciled  Plan  · Orders  o ACO-39: Current medications updated and reviewed () - - 09/21/2020  o Thyroid ab panel (30344) - 244.9/E03.9 - 09/21/2020  o TSH + free t4 (79764, 93599) - 244.9/E03.9 - 09/21/2020  o Diabetes 2 Panel (CMP, Lipid, A1c, Urine Microalbumin) [facility] (93595, 25066, 56049, 94136) - 250.00/E11.9 - 09/21/2020  o CBC with Manual Diff if indicated [facility]  (55854, 83831) - 250.00/E11.9 - 09/21/2020  o Sleep Disorder Clinic Consultation (SLEEP) - 327.23/G47.33 - 09/21/2020   Patient is already a patient of Dr. Meza and needs a new referral sent.   o NEPHROLOGY CONSULTATION (NEPHR) - 585.9/N18.9 - 09/21/2020   Patient is already a patient of Dr. Briones and needs a new referral sent.   · Medications  o Medications have been Reconciled  o Transition of Care or Provider Policy  · Instructions  o Patient was educated/instructed on their diagnosis, treatment and medications prior to discharge from the clinic today.  o FOLLOW UP WILL BE DETERMINED PER LABS.  · Disposition  o Care Transition  o MICHELET Sent            Electronically Signed by: Jesica Alejandra DO - on September 21, 2020 08:48:42 AM

## 2021-05-07 NOTE — PROGRESS NOTES
Progress Note      Patient Name: Fiona Portillo   Patient ID: [id number]   Sex: Female   YOB: 1965    Referring Provider: Charmaine Mena MD    Visit Date: August 12, 2019    Provider: Charmaine Mena MD   Location: [facility]   Location Address: [address] [place] [address] [place]  [address]   Location Phone: [phone]          Chief Complaint     patient needs an updated referral for sleep apnea       History Of Present Illness  Fiona Portillo is a 54 year old /White female who presents for evaluation and treatment of:      She needs a referral back to Dr. Soto.  She is using her CPAP and it helps.  She gets a headache if she doesn't wear it.   She is using Janumet QOD .  She has only had one episode of sugar dropping.    She is fasting so that we can draw his labs for thyroid and sugar.   She goes to the eye doctor in 2 weeks.   She has a place on the pubic area that needs checked.  It looks like a seborrheic keratosis.       Past Medical History  Disease Name Date Onset Notes   Breast Cancer 10/2015 --    Depression --  --    Diabetes mellitus, type 2 --  --    Hyperlipidemia --  --    Hypertension, Benign Essential --  --    Hypothyroid --  --    Sleep apnea --  --          Past Surgical History  Procedure Name Date Notes   Breast Lumpectomy --  --    Tonsillectomy --  --    Tubal ligation --  1994         Medication List  Name Date Started Instructions   anastrozole 1 mg oral tablet 03/11/2019 take 1 tablet (1 mg) by oral route once daily for 90 days   Effexor XR 75 mg oral capsule,extended release 24hr 12/12/2018 take 3 capsules (225 mg) by oral route once daily Start with 1 pill x 3 days , then 2 pills x 3 days then 3 pills qd thereafter   ergocalciferol (vitamin D2) 50,000 unit oral capsule 10/08/2018 take 1 capsule by oral route once daily for 90 days   fexofenadine 180 mg oral tablet  take 1 tablet (180 mg) by oral route once daily   FreeStyle  Lancets 28 gauge miscellaneous misc  use as directed   Januvia 50 mg oral tablet 08/12/2019 take 1 tablet (50 mg) by oral route once daily for 90 days   krill oil-omega-3-dha-epa 300-90 (27-45) mg oral capsule  --    levothyroxine 112 mcg oral tablet 08/12/2019 take 1 tablet (112 mcg) by oral route once daily for 90 days   metformin 500 mg oral tablet 08/12/2019 take 1 tablet (500 mg) by oral route 2 times per day with morning and evening meals for 90 days   rosuvastatin 40 mg oral tablet 03/11/2019 take 1 tablet (40 mg) by oral route once daily for 90 days   telmisartan 40 mg oral tablet 03/11/2019 take 1 tablet (40 mg) by oral route once daily for 90 days         Allergy List  Allergen Name Date Reaction Notes   lisinopril --  --  --          Family Medical History  Disease Name Relative/Age Notes   DM Type II Father/  Grandmother (paternal)/  Mother/  Sister/   Father; Mother; Grandmother (paternal); Sister   Hyperlipidemia Father/  Mother/   Father; Mother   Hypertension Mother/   Mother   Family History Of Breast Cancer Mother/   Mother   Mother, Grandmother, or Sister developed Heart Disease before the age of 65  --    Father, Grandfather, or Brother developed Heart Disease before the age of 65  --          Social History  Finding Status Start/Stop Quantity Notes   Alcohol Former --/-- --  drank alcohol in the past, 7 or less drinks per week   Exercises regularly --  --/-- --  1-2 times per week   Recreational Drug Use Never --/-- --  never used   Tobacco Never --/-- --  never smoker   Uses seatbelts --  --/-- --  yes         Immunizations  NameDate Admin Mfg Trade Name Lot Number Route Inj VIS Given VIS Publication   Ghachhrwh64/08/2018 PMC FLUARIX [id number] IM  10/08/2018 08/07/2015   Comments: ndc [id number]   Dlhqfwwno8644/24/2015 MSD PNEUMOVAX 23 [id number] NE NE 12/12/2018 04/24/2015   Comments:    Tdap02/24/2015 PMC ADACEL [id number] NE NE 12/12/2018 02/24/2015   Comments:          Vitals  Date Time BP  "Position Site L\R Cuff Size HR RR TEMP (F) WT  HT  BMI kg/m2 BSA m2 O2 Sat        08/12/2019 10:35 /62 Sitting    110 - R  97.4 174lbs 6oz 5'  1.5\" 32.41 1.85 98 %          Physical Examination  · Constitutional  o Appearance  o : well developed, well-nourished, in no acute distress  · Eyes  o Conjunctivae  o : conjunctivae normal  · Neck  o Thyroid  o : no thyromegaly  · Respiratory  o Respiratory Effort  o : breathing unlabored  o Auscultation of Lungs  o : clear to ascultation  · Cardiovascular  o Heart  o :   § Auscultation of Heart  § : regular rate and rhythm  o Peripheral Vascular System  o :   § Extremities  § : no edema  · Musculoskeletal  o General  o :   § General Musculoskeletal  § : , Muscle tone, strength, and development grossly normal.  · Skin and Subcutaneous Tissue  o General Inspection  o : there is a stuck on appearance to this brown papule and there is a darker part. It looks like a seborrheic keratosis  · Neurologic  o Gait and Station  o :   § Gait Screening  § : normal gait  · Psychiatric  o Mood and Affect  o : mood normal, affect appropriate  · Left DM Foot Exam  o Sensation  o : normal  o Visual Inspection  o : normal  o Vascular  o : present  · Right DM Foot Exam  o Sensation  o : normal  o Visual Inspection  o : normal  o Vascular  o : normal          Assessment  · Diabetes mellitus, type 2     250.00/E11.9  · Hypothyroidism     244.9/E03.9  · ANDRES (obstructive sleep apnea)     327.23/G47.33  · History of breast cancer     V10.3/Z85.3      Plan  · Orders  o CMP [facility] (79767) - 250.00/E11.9 - 08/12/2019  o Hgb A1c [facility] (76653) - 250.00/E11.9 - 08/12/2019  o Lipid Panel [facility] (66999) - 250.00/E11.9 - 08/12/2019  o TSH [facility] (07228) - 244.9/E03.9 - 08/12/2019  o ACO-39: Current medications updated and reviewed () - - 08/12/2019  o PULMONARY CONSULTATION (PULMO) - 327.23/G47.33 - 08/12/2019  o ONCOLOGY / HEMATOLOGY CONSULTATION (HEMOC) - V10.3/Z85.3 - " 08/12/2019  · Instructions  o Patient was educated/instructed on their diagnosis, treatment and medications prior to discharge from the clinic today.            Electronically Signed by: Charmaine Mena MD -Author on August 14, 2019 12:19:44 PM

## 2021-05-07 NOTE — PROGRESS NOTES
Progress Note      Patient Name: Fiona Portillo   Patient ID: [id number]   Sex: Female   YOB: 1965        Visit Date: November 9, 2018    Provider: Charmaine Mena MD   Location: [facility]   Location Address: [address] [place] [address]  [id number]   Location Phone: [phone]          Chief Complaint     Had 2 pelvic pains       History Of Present Illness  Fiona Portillo is a 53 year old /White female who presents for evaluation and treatment of:      Last Saturday night she had pelvic pain and it hit again Tuesday night.  It was a sharp pain that went across the lower abdomen.  It lasted 1 minute.  She thought she had to go to the [facility] .  Not constipated.  Her sugar has been higher . She wasn't sure if she had a UTI.  Sugar 160-170  Up to 272 was the highest.  Last month she had a UTI per a home kit and she had lef-over antibiotics that she took.  She denies vaginal discharge.  [place] is painful but that has been that way since she had chemo.  Her GYN told her to use coconut something.  I recommend Replens       Past Medical History  Disease Name Date Onset Notes   Breast Cancer --  --    Depression --  --    Diabetes mellitus, type 2 --  --    Hyperlipidemia --  --    Hypertension, Benign Essential --  --    Hypothyroid --  --    Sleep apnea --  --          Past Surgical History  Procedure Name Date Notes   Breast Lumpectomy --  --    Tonsillectomy --  --    Tubal ligation --  --          Medication List  Name Date Started Instructions   anastrozole 1 mg oral tablet 08/20/2018 take 1 tablet (1 mg) by oral route once daily for 90 days   ergocalciferol (vitamin D2) 50,000 unit oral capsule 10/08/2018 take 1 capsule by oral route once daily for 90 days   fexofenadine 180 mg oral tablet  take 1 tablet (180 mg) by oral route once daily   FreeStyle Lancets 28 gauge miscellaneous misc  use as directed   Janumet 50-1,000 mg oral tablet  take 1 tablet by  oral route 2 times per day with meals   krill oil-omega-3-dha-epa 300-90 (27-45) mg oral capsule  --    levothyroxine 75 mcg oral tablet 10/08/2018 take 1 tablet (75 mcg) by oral route once daily for 90 days   rosuvastatin 40 mg oral tablet 08/20/2018 take 1 tablet (40 mg) by oral route once daily for 90 days   telmisartan 40 mg oral tablet  take 1 tablet (40 mg) by oral route once daily         Allergy List  Allergen Name Date Reaction Notes   lisinopril --  --  --          Family Medical History  Disease Name Relative/Age Notes   DM Type II / Father; Mother; Grandmother (paternal); Sister    Father/     Grandmother (paternal)/     Mother/     Sister/    Family History Of Breast Cancer / Mother    Mother/    Father, Grandfather, or Brother developed Heart Disease before the age of 65 / --    Hyperlipidemia / Father; Mother    Father/     Mother/    Hypertension / Mother    Mother/    Mother, Grandmother, or Sister developed Heart Disease before the age of 65 / --          Social History  Finding Status Start/Stop Quantity Notes   Alcohol Former --/-- --  drank alcohol in the past, 7 or less drinks per week   Exercises regularly --  --/-- --  1-2 times per week   Recreational Drug Use Never --/-- --  never used   Tobacco Never --/-- --  never smoker   Uses seatbelts --  --/-- --  yes         Immunizations  NameDate Admin Mfg Trade Name Lot Number Route Inj VIS Given VIS Publication   Lwyrvavpq08/08/2018 [facility] Fluzone Quadrivalent [id number] IM  10/08/2018 08/07/2015   Comments: ndc [id number]         Vitals  Date Time BP Position Site L\R Cuff Size HR RR TEMP(F) WT  HT  BMI kg/m2 BSA m2 O2 Sat HC       11/09/2018 01:11 /70 Sitting    113 - R  97.7 180lbs 0oz    98 %           Physical Examination  · Constitutional  o Appearance  o : well developed, well-nourished, in no acute distress  · Head and Face  o HEENT  o : a little puffy around her eyes.  · Eyes  o Conjunctivae  o : conjunctivae  normal  · Neck  o Inspection/Palpation  o : supple  o Thyroid  o : no thyromegaly  · Respiratory  o Respiratory Effort  o : breathing unlabored  o Auscultation of Lungs  o : clear to ascultation  · Cardiovascular  o Heart  o :   § Auscultation of Heart  § : regular rate and rhythm  o Peripheral Vascular System  o :   § Extremities  § : no edema  · Lymphatic  o Neck  o : no lymphadenopathy present  · Musculoskeletal  o General  o :   § General Musculoskeletal  § : No joint swelling or deformity., Muscle tone, strength, and development grossly normal.  · Skin and Subcutaneous Tissue  o General Inspection  o : no lesions present, no areas of discoloration, skin turgor normal, texture normal  · Neurologic  o Gait and Station  o :   § Gait Screening  § : normal gait  · Psychiatric  o Mood and Affect  o : mood normal, affect appropriate          Assessment  · Abdominal pain     789.00/R10.9      Plan  · Orders  o IOP - Urinalysis without Microscopy (Clinitek) [facility] ([address]30) - 789.00/R10.9 - 11/09/2018  o Urine culture (97815, 60841) - 789.00/R10.9 - 11/09/2018  o ACO-39: Current medications updated and reviewed () - - 11/09/2018  · Instructions  o Instructed to seek medical attention urgently for new or worsening symptoms.  o Patient was educated/instructed on their diagnosis, treatment and medications prior to discharge from the clinic today.            Electronically Signed by: Charmaine Mena MD -Author on November 9, 2018 03:28:27 PM

## 2021-05-07 NOTE — PROGRESS NOTES
Progress Note      Patient Name: Fiona Portillo   Patient ID: [id number]   Sex: Female   YOB: 1965        Visit Date: October 9, 2019    Provider: Jesica Alejandra DO   Location: [facility]   Location Address: [address] [place] [address] [place]  [address]   Location Phone: [phone]          Chief Complaint     1 WEEK FOLLOW UP.       History Of Present Illness  Fiona Portillo is a 54 year old /White female who presents for evaluation and treatment of:      1.) F/U renal function testing : The patient presents for f/u regarding renal function testing. She presents with a history of diabetes. Her creatinine was noted at 1.39 via lab testing performed on 9/23. Repeat testing revealed a creatinine of 1.51 on 9/27. Her hemoglobin A1C was noted at 6.9%. The patient denies any significant complaints at this time. She denies any urinary sxs. She has been taking her prescribed diabetic medications as prescribed. She is not prescribed insulin.       Past Medical History  Disease Name Date Onset Notes   Breast Cancer 10/2015 --    Depression --  --    Diabetes mellitus, type 2 --  --    Hyperlipidemia --  --    Hypertension, Benign Essential --  --    Hypothyroid --  --    Sleep apnea --  --          Past Surgical History  Procedure Name Date Notes   Breast Lumpectomy --  --    Tonsillectomy --  --    Tubal ligation --  1994         Medication List  Name Date Started Instructions   anastrozole 1 mg oral tablet 10/09/2019 take 1 tablet (1 mg) by oral route once daily for 90 days   Effexor XR 75 mg oral capsule,extended release 24hr 12/12/2018 take 3 capsules (225 mg) by oral route once daily Start with 1 pill x 3 days , then 2 pills x 3 days then 3 pills qd thereafter   ergocalciferol (vitamin D2) 50,000 unit oral capsule 10/08/2018 take 1 capsule by oral route once daily for 90 days   fexofenadine 180 mg oral tablet  take 1 tablet (180 mg) by oral route once daily   FreeStyle  Lancets 28 gauge miscellaneous misc  use as directed   Januvia 50 mg oral tablet 08/12/2019 take 1 tablet (50 mg) by oral route once daily for 90 days   levothyroxine 112 mcg oral tablet 08/12/2019 take 1 tablet (112 mcg) by oral route once daily for 90 days   metformin 500 mg oral tablet 08/12/2019 take 1 tablet (500 mg) by oral route 2 times per day with morning and evening meals for 90 days   rosuvastatin 40 mg oral tablet 10/09/2019 take 1 tablet (40 mg) by oral route once daily for 90 days   telmisartan 40 mg oral tablet 10/09/2019 take 1 tablet (40 mg) by oral route once daily for 90 days         Allergy List  Allergen Name Date Reaction Notes   lisinopril --  --  --          Family Medical History  Disease Name Relative/Age Notes   DM Type II Father/  Grandmother (paternal)/  Mother/  Sister/   Father; Mother; Grandmother (paternal); Sister   Hyperlipidemia Father/  Mother/   Father; Mother   Hypertension Mother/   Mother   Family History Of Breast Cancer Mother/   Mother   Mother, Grandmother, or Sister developed Heart Disease before the age of 65  --    Father, Grandfather, or Brother developed Heart Disease before the age of 65  --          Social History  Finding Status Start/Stop Quantity Notes   Alcohol Former --/-- --  drank alcohol in the past, 7 or less drinks per week   Exercises regularly --  --/-- --  1-2 times per week   Recreational Drug Use Never --/-- --  never used   Tobacco Never --/-- --  never smoker   Uses seatbelts --  --/-- --  yes         Immunizations  NameDate Admin Mfg Trade Name Lot Number Route Inj VIS Given VIS Publication   Uvzyqmfpd65/08/2018 [employer] FLUARIX [id number] IM  10/08/2018 08/07/2015   Comments: ndc [id number]   Fwyhjxtlw7248/24/2015 MSD PNEUMOVAX 23 [id number] NE NE 12/12/2018 04/24/2015   Comments:    Tdap02/24/2015 [facility] ADACEL [id number] NE NE 12/12/2018 02/24/2015   Comments:          Review of Systems  · Constitutional  o Denies  o : fatigue, night  "sweats  · Eyes  o Denies  o : double vision, blurred vision  · HENT  o Denies  o : vertigo, recent head injury  · Cardiovascular  o Denies  o : chest pain, irregular heart beats  · Respiratory  o Denies  o : shortness of breath, productive cough  · Gastrointestinal  o Denies  o : nausea, vomiting  · Genitourinary  o Denies  o : dysuria, urinary retention  · Integument  o Denies  o : hair growth change, new skin lesions  · Neurologic  o Denies  o : altered mental status, seizures  · Musculoskeletal  o Denies  o : joint swelling, limitation of motion  · Endocrine  o Denies  o : cold intolerance, heat intolerance  · Psychiatric  o Denies  o : hallucinations, feeling confused  · Heme-Lymph  o Denies  o : petechiae, lymph node enlargement or tenderness  · Allergic-Immunologic  o Denies  o : frequent illnesses      Vitals  Date Time BP Position Site L\R Cuff Size HR RR TEMP (F) WT  HT  BMI kg/m2 BSA m2 O2 Sat HC       10/09/2019 09:45 /76 Sitting      97 174lbs 0oz 5'  1.5\" 32.34 1.85 98 %          Physical Examination  · Constitutional  o Appearance  o : well developed, well-nourished, in no acute distress  · Head and Face  o HEENT  o : Unremarkable  · Eyes  o Conjunctivae  o : conjunctivae normal  · Neck  o Inspection/Palpation  o : supple  · Respiratory  o Respiratory Effort  o : breathing unlabored  o Auscultation of Lungs  o : clear to ascultation  · Cardiovascular  o Heart  o :   § Auscultation of Heart  § : regular rate and rhythm  o Peripheral Vascular System  o :   § Extremities  § : no edema  · Musculoskeletal  o General  o :   § General Musculoskeletal  § : No joint swelling or deformity.  · Skin and Subcutaneous Tissue  o General Inspection  o : no rashes present  · Neurologic  o Gait and Station  o :   § Gait Screening  § : normal gait  · Psychiatric  o Mood and Affect  o : mood normal, affect appropriate              Assessment  · Diabetes mellitus, type 2     250.00/E11.9    A.) Due to up trending " creatinine, will refer the patient to nephrology for an evaluation and recommendations; she will continue to take her diabetic medications as prescribed; healthy diet and exercise recommended; patient will follow up after visit with nephrology.     · Abnormal renal function finding     593.9/N28.9    A.) See above.         Plan  · Orders  o ACO-27: Most recent 2019 HgbA1c less than 7 [facility] (3044F) - 250.00/E11.9 - 10/09/2019  o ACO-39: Current medications updated and reviewed () - - 10/09/2019  · Medications  o anastrozole 1 mg oral tablet   SIG: take 1 tablet (1 mg) by oral route once daily for 90 days   DISP: (90) tablets with 0 refills  Refilled on 10/09/2019     o rosuvastatin 40 mg oral tablet   SIG: take 1 tablet (40 mg) by oral route once daily for 90 days   DISP: (90) tablets with 0 refills  Refilled on 10/09/2019     o telmisartan 40 mg oral tablet   SIG: take 1 tablet (40 mg) by oral route once daily for 90 days   DISP: (90) tablet with 0 refills  Refilled on 10/09/2019     o Medications have been Reconciled  o Transition of Care or Provider Policy  · Instructions  o Patient was educated/instructed on their diagnosis, treatment and medications prior to discharge from the clinic today.  o Return after assessment by nephrology.             Electronically Signed by: Jesica Alejandra DO - on October 9, 2019 11:10:42 AM

## 2021-05-07 NOTE — PROGRESS NOTES
Progress Note      Patient Name: Fiona Portillo   Patient ID: [id number]   Sex: Female   YOB: 1965        Visit Date: December 12, 2018    Provider: Charmaine Mena MD   Location: [facility] [place]   Location Address: [address] [place] [address]  [id number]   Location Phone: [phone]          Chief Complaint  · Annual Exam  · PAP exam  · (Health Maintainence Information Reviewed Under Results)      History Of Present Illness  Last PAP Smear: 09/22/2016.   Date of Last Mammogram: 12/12/2018.   Date of Last Colonoscopy: DATE OF LAST COLONOSCOPY   No current complaints.   Fiona Portillo is a 53 year old /White female who presents for evaluation and treatment of:      She is here for her annual exam.  her previous PAP smears have been normal.  her LMP was ~8 years ago.  She has tried evening primrose and Ky jelly for the vaginal dryness.  Her dryness is probably from menopause and the anti-estrogen she takes for breast cancer.  She has already tried Effexor 150 mg which didn't help.  I will try her on the 225 mg and see if it does anything.    It is time for a repeat mammogram also.       Past Medical History  Disease Name Date Onset Notes   Breast Cancer --  --    Depression --  --    Diabetes mellitus, type 2 --  --    Hyperlipidemia --  --    Hypertension, Benign Essential --  --    Hypothyroid --  --    Sleep apnea --  --          Past Surgical History  Procedure Name Date Notes   Breast Lumpectomy --  --    Tonsillectomy --  --    Tubal ligation --  1994         Medication List  Name Date Started Instructions   anastrozole 1 mg oral tablet 08/20/2018 take 1 tablet (1 mg) by oral route once daily for 90 days   ergocalciferol (vitamin D2) 50,000 unit oral capsule 10/08/2018 take 1 capsule by oral route once daily for 90 days   fexofenadine 180 mg oral tablet  take 1 tablet (180 mg) by oral route once daily   FreeStyle Lancets 28 gauge miscellaneous misc  use as  directed   Janumet 50-1,000 mg oral tablet  take 1 tablet by oral route 2 times per day with meals   krill oil-omega-3-dha-epa 300-90 (27-45) mg oral capsule  --    levothyroxine 75 mcg oral tablet 10/08/2018 take 1 tablet (75 mcg) by oral route once daily for 90 days   rosuvastatin 40 mg oral tablet 08/20/2018 take 1 tablet (40 mg) by oral route once daily for 90 days   telmisartan 40 mg oral tablet  take 1 tablet (40 mg) by oral route once daily         Allergy List  Allergen Name Date Reaction Notes   lisinopril --  --  --          Family Medical History  Disease Name Relative/Age Notes   DM Type II / Father; Mother; Grandmother (paternal); Sister    Father/     Grandmother (paternal)/     Mother/     Sister/    Family History Of Breast Cancer / Mother    Mother/    Father, Grandfather, or Brother developed Heart Disease before the age of 65 / --    Hyperlipidemia / Father; Mother    Father/     Mother/    Hypertension / Mother    Mother/    Mother, Grandmother, or Sister developed Heart Disease before the age of 65 / --          Social History  Finding Status Start/Stop Quantity Notes   Alcohol Former --/-- --  drank alcohol in the past, 7 or less drinks per week   Exercises regularly --  --/-- --  1-2 times per week   Recreational Drug Use Never --/-- --  never used   Tobacco Never --/-- --  never smoker   Uses seatbelts --  --/-- --  yes         Immunizations  NameDate Admin Mfg Trade Name Lot Number Route Inj VIS Given VIS Publication   Erdfjtjoj95/08/2018 [facility] Fluzone Quadrivalent [id number] IM  10/08/2018 08/07/2015   Comments: ndc [id number]         Review of Systems  · Constitutional  o Admits  o : night sweats  o Denies  o : appetite change, fatigue, , weight gain, weight loss  · HENT  o Denies  o : hearing loss, tinnitus, vertigo, nasal discharge, nose bleeding, dental problems, oral lesions, sore throat  · Breasts  o Denies  o : lumps, tenderness, swelling, nipple  "discharge  · Cardiovascular  o Denies  o : chest pain, decreased exercise tolerance, dyspnea on exertion, palpitations  · Respiratory  o Denies  o : cough, shortness of breath, wheezing, snoring, apneas  · Gastrointestinal  o Denies  o : abdominal pain, nausea, vomiting, dysphagia, heartburn, changes in bowel habits, constipation, diarrhea  · Genitourinary  o Admits  o : hot flashes  o Denies  o : dysuria, hematuria, incontinence, nocturia, frequency, urgency, sexual dysfunction  · Neurologic  o Denies  o : abnormal gait, facial weakness, headache, memory difficulties, tingling or numbness, seizures, tremors  · Psychiatric  o Admits  o : sleep disturbance Has had stress recently in regards to their son's wedding which was annulled  o Denies  o : anxiety, decreased concentration, irritability, panic attacks, , sadness/tearfulness      Vitals  Date Time BP Position Site L\R Cuff Size HR RR TEMP(F) WT  HT  BMI kg/m2 BSA m2 O2 Sat        12/12/2018 08:00 /70 Sitting    107 - R  97 180lbs 0oz 5'  1.5\" 33.46 1.88 94 %           Physical Examination  · Constitutional  o Appearance  o : well developed, well-nourished, in no acute distress  · Eyes  o Conjunctivae  o : conjunctivae normal  · Neck  o Inspection/Palpation  o : supple  o Thyroid  o : no thyromegaly  · Respiratory  o Respiratory Effort  o : breathing unlabored  o Auscultation of Lungs  o : clear to ascultation  · Cardiovascular  o Heart  o :   § Auscultation of Heart  § : regular rate and rhythm  o Peripheral Vascular System  o :   § Extremities  § : no edema  · Breasts  o Inspection of Breasts  o : R breast lumpectomy with no masses no lymph nodes  · Gastrointestinal  o Abdominal Examination  o :   § Abdomen  § : central obesity  · Genitourinary  o FEMALE  EXAM:  o : normal external genitalia Her dryness is not severe Cervix appears healthy It is difficult to palpate her ovaries because of her weight.  · Lymphatic  o Neck  o : no lymphadenopathy " present  · Musculoskeletal  o General  o :   § General Musculoskeletal  § : No joint swelling or deformity., Muscle tone, strength, and development grossly normal.  · Skin and Subcutaneous Tissue  o General Inspection  o : normal  · Neurologic  o Gait and Station  o :   § Gait Screening  § : normal gait  · Psychiatric  o Mood and Affect  o : mood normal, affect appropriate          Assessment  · Normal Pap Smear       Encounter for gynecological examination (general) (routine) without abnormal findings     V76.2/Z01.419  · Routine gynecological examination     V72.31/Z01.419  · History of breast cancer     V10.3/Z85.3      Plan  · Orders  o Pap smear (38816) - V72.31/Z01.419 - 12/12/2018  o ACO-39: Current medications updated and reviewed () - - 12/12/2018  o Mammogram diagnostic 2D breast unilateral RIGHT (w/US if needed) [facility]. (18511, -LB) - - 12/12/2018  o Screening Mammogram 2D Unilateral LEFT. (57309, ) - - 12/12/2018  · Medications  o Effexor XR 75 mg oral capsule,extended release 24hr   SIG: take 3 capsules (225 mg) by oral route once daily Start with 1 pill x 3 days , then 2 pills x 3 days then 3 pills qd thereafter   DISP: (270) capsules with 5 refills  Prescribed on 12/12/2018     · Instructions  o Counseled on monthly breast self exams.   o Counseled on STD prevention.  o Counseled on diet and exercise.   o Counseled on weight-bearing exercise.  o Recommended Calcium with Vitamin D twice daily.  o Patient was educated/instructed on their diagnosis, treatment and medications prior to discharge from the clinic today.            Electronically Signed by: Charmaine Mena MD -Author on December 12, 2018 08:57:57 AM

## 2021-05-07 NOTE — PROGRESS NOTES
Progress Note      Patient Name: Fiona Portillo   Patient ID: [id number]   Sex: Female   YOB: 1965        Visit Date: April 15, 2020    Provider: Jesica Alejandra DO   Location: [facility]   Location Address: [address] [place] [address] [place]  [address]   Location Phone: [phone]          Chief Complaint     F/U THYROID LABS AND DM       History Of Present Illness  Fiona Portillo is a 55 year old /White female who presents for evaluation and treatment of:      1.) DM TYPE II : Presents for follow-up regarding diabetes mellitus type 2. Most recent hemoglobin A1c from January 2020 was noted at 7.5%. She is currently prescribed Janumet and Invokana. She reports that her fasting blood glucose has been noted to been between 80's - 140s during the past few months. She denies any concerns with her medications. History of CKD where the patient is followed by nephrology, Dr. Briones. Most recent visit with nephrology was 2 weeks ago. We will request the office note.    2.) HYPOTHYROIDISM : Most recent TSH noted at 11.550. Patient denies any concerns with current dose of the medication. Is prescribed levothyroxine 112 mcg. Does admits to 'mild hair loss'. She is due for recheck of her thyroid function.       Past Medical History  Disease Name Date Onset Notes   Breast Cancer 10/2015 --    CKD (chronic kidney disease), stage III --  --    Depression --  --    Diabetes mellitus, type 2 --  --    Hyperlipidemia --  --    Hypertension, Benign Essential --  --    Hypothyroid --  --    Sleep apnea --  --          Past Surgical History  Procedure Name Date Notes   Breast Lumpectomy --  --    Colonoscopy 02/08/17 --    Tonsillectomy --  --    Tubal ligation --  1994         Medication List  Name Date Started Instructions   anastrozole 1 mg oral tablet 01/10/2020 take 1 tablet (1 mg) by oral route once daily for 90 days   Effexor XR 75 mg oral capsule,extended release 24hr 03/13/2020  take 3 capsules (225 mg) by oral route once daily Start with 1 pill x 3 days , then 2 pills x 3 days then 3 pills qd thereafter   fexofenadine 180 mg oral tablet  take 1 tablet (180 mg) by oral route once daily   FreeStyle Lancets 28 gauge miscellaneous misc 03/18/2020 use as directed   FreeStyle Lite Strips miscellaneous strip 03/18/2020 Test BID   Invokana 100 mg oral tablet 01/17/2020 take 1 tablet (100 mg) by oral route once daily before the first meal of the day for 90 days   Januvia 50 mg oral tablet 03/13/2020 take 1 tablet (50 mg) by oral route once daily for 90 days   levothyroxine 112 mcg oral tablet 08/12/2019 take 1 tablet (112 mcg) by oral route once daily for 90 days   rosuvastatin 40 mg oral tablet 01/10/2020 take 1 tablet (40 mg) by oral route once daily for 90 days   telmisartan 40 mg oral tablet 01/10/2020 take 1 tablet (40 mg) by oral route once daily for 90 days   Vitamin D2 50,000 unit oral capsule  take 1 capsule (50,000 unit) by oral route once weekly         Allergy List  Allergen Name Date Reaction Notes   lisinopril --  --  --          Family Medical History  Disease Name Relative/Age Notes   DM Type II Father/  Grandmother (paternal)/  Mother/  Sister/   Father; Mother; Grandmother (paternal); Sister   Hyperlipidemia Father/  Mother/   Father; Mother   Hypertension Mother/   Mother   Family History Of Breast Cancer Mother/   Mother   Mother, Grandmother, or Sister developed Heart Disease before the age of 65  --    Father, Grandfather, or Brother developed Heart Disease before the age of 65  --          Social History  Finding Status Start/Stop Quantity Notes   Alcohol Former --/-- --  drank alcohol in the past, 7 or less drinks per week   Exercises regularly --  --/-- --  1-2 times per week   Recreational Drug Use Never --/-- --  never used   Tobacco Never --/-- --  never smoker   Uses seatbelts --  --/-- --  yes         Immunizations  NameDate Admin Mfg Trade Name Lot Number Route Inj  VIS Given VIS Publication   Mwrvdiajs28/10/2020 [facility] FLUZONE [id number]  RD 01/10/2020    Comments: [id number]   Cyxrqdlyi36/08/2018 [facility] FLUARIX [id number] IM  10/08/2018 08/07/2015   Comments: ndc [id number]   Gjitkibvh5182/24/2015 [facility] PNEUMOVAX 23 [id number] NE NE 12/12/2018 04/24/2015   Comments:    Tdap02/24/2015 [facility] ADACEL [id number] NE NE 12/12/2018 02/24/2015   Comments:          Review of Systems  · Constitutional  o Denies  o : fatigue, night sweats  · Eyes  o Denies  o : double vision, blurred vision  · HENT  o Denies  o : vertigo, recent head injury  · Cardiovascular  o Denies  o : chest pain, irregular heart beats  · Respiratory  o Denies  o : shortness of breath, productive cough  · Gastrointestinal  o Denies  o : nausea, vomiting  · Genitourinary  o Denies  o : dysuria, urinary retention  · Integument  o Admits  o : skin dryness, hair growth change  · Neurologic  o Denies  o : altered mental status, seizures  · Musculoskeletal  o Denies  o : joint swelling, limitation of motion  · Endocrine  o Denies  o : cold intolerance, heat intolerance  · Psychiatric  o Denies  o : hallucinations, delusions  · Heme-Lymph  o Denies  o : petechiae, lymph node enlargement or tenderness  · Allergic-Immunologic  o Denies  o : frequent illnesses      Vitals  Date Time BP Position Site L\R Cuff Size HR RR TEMP (F) WT  HT  BMI kg/m2 BSA m2 O2 Sat        04/15/2020 09:30 /60 Sitting    100 - R  96.8     99 %          Physical Examination  · Constitutional  o Appearance  o : alert, in no acute distress  · Head and Face  o HEENT  o : unremarkable  · Eyes  o Conjunctivae  o : conjunctivae normal  o Pupils and Irises  o : pupils equal and round  · Neck  o Inspection/Palpation  o : supple  o Thyroid  o : nontender, no nodules or masses present on palpation  · Respiratory  o Respiratory Effort  o : breathing unlabored  o Auscultation of Lungs  o : clear to ascultation  · Cardiovascular  o Heart  o :   § Auscultation of  Heart  § : regular rate and rhythm  o Peripheral Vascular System  o :   § Extremities  § : no cyanosis  · Musculoskeletal  o General  o :   § General Musculoskeletal  § : no joint swelling appreciated   · Skin and Subcutaneous Tissue  o General Inspection  o : no rashes appreciated   · Neurologic  o Gait and Station  o :   § Gait Screening  § : normal gait  · Psychiatric  o Mood and Affect  o : mood normal, affect appropriate          Assessment  · Diabetes mellitus, type 2     250.00/E11.9    A.) We will check diabetic labs today. Healthy diet and exercise emphasized. Continue to monitor blood glucose at home. Take rx as prescribed.     · Hypothyroidism     244.9/E03.9    A.) Thyroid function labs ordered as noted. Continue to take rx as prescribed. Will advised as indicated per labs.     · CKD (chronic kidney disease)     585.9/N18.9    A.) Will request most recent nephrology office note from Dr. Briones's office.       Problems Reconciled  Plan  · Orders  o ACO-39: Current medications updated and reviewed () - - 04/15/2020  o Diabetes 2 Panel (CMP, Lipid, A1c, Urine Microalbumin) [facility] (52708, 12816, 39540, 97756) - 250.00/E11.9 - 04/15/2020  o TSH + free t4 (49305, 65052) - 244.9/E03.9 - 04/15/2020  · Medications  o Medications have been Reconciled  o Transition of Care or Provider Policy  · Instructions  o Continue blood sugar monitoring daily and record. Bring your log to office visits. Call the office for readings below 70 and above 250 or any complications.  o Daily foot care. Avoid walking barefoot. Annual Dilated Eye Exam.  o Discussed with patient blood pressure monitoring, hemoglobin A1C levels need to be below 7.0, and LDL (Lipid) goals below 70.  o Take all medications as prescribed/directed.  o Patient was educated/instructed on their diagnosis, treatment and medications prior to discharge from the clinic today.            Electronically Signed by: Jesica Alejandra DO - on April 15, 2020 09:53:14  AM

## 2021-05-07 NOTE — PROGRESS NOTES
Progress Note      Patient Name: Fiona Portillo   Patient ID: [id number]   Sex: Female   YOB: 1965        Visit Date: January 10, 2020    Provider: Jesica Alejandra DO   Location: [facility]   Location Address: [address] [place] [address] [place]  [address]   Location Phone: [phone]          Chief Complaint     LABS, CKD III W/ PROTEINURIA       History Of Present Illness  Fiona Portillo is a 54 year old /White female who presents for evaluation and treatment of:      1.) DIABETES TYPE II : Recent A1c was completed in September 2019 and was noted at goal. She is due for a diabetic foot exam. Most recent dilated retinal exam was performed in September 2019 with results uploaded to chart. She is due for labs today. She does not check her blood sugar regularly at home.     2.) CHRONIC KIDNEY DISEASE III W/ PROTEINURIA : The patient was evaluated recently by nephrology.  Her chronic kidney disease appears to be likely secondary to nephrosclerosis secondary to hypertension and diabetes per review of nephrology's note. She will be following up in February 2020.     3.) MEDICATION REFILLS : She presents for refills today for the following medications Temisartan , Rosuvastatin and Anastrozole.     4.) LABS : The patient is due for labs today. She notes that she 'feels as if my thyroid levels will be high, because I have not been taking my medications.'     5.) PREVENTIVE HEALTH : She is due for the annual influenza vaccine today. Hx of breast cancer with most recent mammogram completed in 3/2019. The patient is scheduled for her 2020 mammogram.   She is followed by [facility]. Her most recent colonoscopy was completed in 2017.       Past Medical History  Disease Name Date Onset Notes   Breast Cancer 10/2015 --    CKD (chronic kidney disease), stage III --  --    Depression --  --    Diabetes mellitus, type 2 --  --    Hyperlipidemia --  --    Hypertension, Benign Essential --  --     Hypothyroid --  --    Screening Mammogram 03/15/19 --    Sleep apnea --  --          Past Surgical History  Procedure Name Date Notes   Breast Lumpectomy --  --    Colonoscopy 02/08/17 --    Tonsillectomy --  --    Tubal ligation --  1994         Medication List  Name Date Started Instructions   anastrozole 1 mg oral tablet 01/10/2020 take 1 tablet (1 mg) by oral route once daily for 90 days   Effexor XR 75 mg oral capsule,extended release 24hr 12/16/2019 take 3 capsules (225 mg) by oral route once daily Start with 1 pill x 3 days , then 2 pills x 3 days then 3 pills qd thereafter   ergocalciferol (vitamin D2) 50,000 unit oral capsule 10/08/2018 take 1 capsule by oral route once daily for 90 days   fexofenadine 180 mg oral tablet  take 1 tablet (180 mg) by oral route once daily   FreeStyle Lancets 28 gauge miscellaneous misc  use as directed   Januvia 50 mg oral tablet 08/12/2019 take 1 tablet (50 mg) by oral route once daily for 90 days   levothyroxine 112 mcg oral tablet 08/12/2019 take 1 tablet (112 mcg) by oral route once daily for 90 days   metformin 500 mg oral tablet 08/12/2019 take 1 tablet (500 mg) by oral route 2 times per day with morning and evening meals for 90 days   rosuvastatin 40 mg oral tablet 01/10/2020 take 1 tablet (40 mg) by oral route once daily for 90 days   telmisartan 40 mg oral tablet 01/10/2020 take 1 tablet (40 mg) by oral route once daily for 90 days         Allergy List  Allergen Name Date Reaction Notes   lisinopril --  --  --          Family Medical History  Disease Name Relative/Age Notes   DM Type II Father/  Grandmother (paternal)/  Mother/  Sister/   Father; Mother; Grandmother (paternal); Sister   Hyperlipidemia Father/  Mother/   Father; Mother   Hypertension Mother/   Mother   Family History Of Breast Cancer Mother/   Mother   Mother, Grandmother, or Sister developed Heart Disease before the age of 65  --    Father, Grandfather, or Brother developed Heart Disease before the  "age of 65  --          Social History  Finding Status Start/Stop Quantity Notes   Alcohol Former --/-- --  drank alcohol in the past, 7 or less drinks per week   Exercises regularly --  --/-- --  1-2 times per week   Recreational Drug Use Never --/-- --  never used   Tobacco Never --/-- --  never smoker   Uses seatbelts --  --/-- --  yes         Immunizations  NameDate Admin Mfg Trade Name Lot Number Route Inj VIS Given VIS Publication   Tkzupupaz05/10/2020 [facility] FLUZONE [id number] IM RD 01/10/2020    Comments: [id number]   Phtgveran40/08/2018 [facility] FLUARIX [id number] IM  10/08/2018 08/07/2015   Comments: ndc [id number]   Dxkqmuywz1049/24/2015 [facility] PNEUMOVAX 23 [id number] NE NE 12/12/2018 04/24/2015   Comments:    Tdap02/24/2015 [facility] ADACEL [id number] NE NE 12/12/2018 02/24/2015   Comments:          Review of Systems  · Constitutional  o Denies  o : fatigue, fever  · Eyes  o Denies  o : blurred or double vision  · HENT  o Denies  o : hearing loss or ringing, chronic sinus problem, swollen glands in neck  · Cardiovascular  o Denies  o : chest pain, palpitations (fast, fluttering, or skipping beats), swelling (feet, ankles, hands), shortness of breath while walking or lying flat  · Respiratory  o Denies  o : shortness of breath, asthma or wheezing, COPD  · Gastrointestinal  o Denies  o : ulcers, nausea or vomiting  · Genitourinary  o Denies  o : frequency, hematuria  · Integument  o Denies  o : rash, skin dryness  · Neurologic  o Denies  o : lightheaded or dizzy, stroke, headaches  · Musculoskeletal  o Denies  o : joint pain, back pain  · Psychiatric  o Denies  o : delusions, feeling confused  · Heme-Lymph  o Denies  o : petechiae, purpura, pica      Vitals  Date Time BP Position Site L\R Cuff Size HR RR TEMP (F) WT  HT  BMI kg/m2 BSA m2 O2 Sat        01/10/2020 10:13 /62 Sitting    118 - R  96.9 172lbs 8oz 5'  1.5\" 32.07 1.84 99 %          Physical Examination  · Constitutional  o Appearance  o : well developed, " well-nourished, in no acute distress  · Head and Face  o HEENT  o : Unremarkable  · Eyes  o Conjunctivae  o : conjunctivae normal  · Neck  o Inspection/Palpation  o : supple  o Thyroid  o : gland size normal, nontender, no nodules or masses present on palpation, thyroid motion normal during swallowing  · Respiratory  o Respiratory Effort  o : breathing unlabored  o Auscultation of Lungs  o : clear to ascultation  · Cardiovascular  o Heart  o :   § Auscultation of Heart  § : regular rate and rhythm  o Peripheral Vascular System  o :   § Extremities  § : no edema  · Lymphatic  o Neck  o : no lymphadenopathy present  · Musculoskeletal  o General  o :   § General Musculoskeletal  § : no joint swelling  · Skin and Subcutaneous Tissue  o General Inspection  o : no lesions present, no areas of discoloration, skin turgor normal, texture normal  · Neurologic  o Gait and Station  o :   § Gait Screening  § : normal gait  · Psychiatric  o Mood and Affect  o : mood normal, affect appropriate  · Left DM Foot Exam  o Sensation  o : normal sensory exam perceptible to 10-gram nylon monofilament exam (5/5), vibration and light touch.  o Visual Inspection  o : visual inspection is normal with no signs of breakdown, ulcerations or deformities unless otherwise noted.   o Vascular  o : palpable dorsalis pedis and posterir tibialis pulses present, normal capillary refill  · Right DM Foot Exam  o Sensation  o : normal sensory exam perceptible to 10-gram nylon monofilament exam (5/5), vibration and light touch.  o Visual Inspection  o : visual inspection is normal with no signs of breakdown, ulcerations or deformities unless otherwise noted.   o Vascular  o : palpable dorsalis pedis and posterir tibialis pulses present, normal capillary refill              Assessment  · Need for influenza vaccination     V04.81/Z23    A.) Administered here in clinic today.    · Diabetes mellitus, type 2     250.00/E11.9    A.) Labs as noted below; healthy  diet & exercise; better blood glucose at home; continue take medications as prescribed.     · Hyperlipidemia     272.4/E78.5    A.) Labs as noted below.     · Depression     311/F32.9    A.) PHQ 9 score of 5 patient & denies any additional intervention today.     · Hypertension, Benign Essential     401.1/I10    A.) Temisartan refilled as noted below and will check labs.     · Hypothyroid     244.9/E03.9    A.) Labs as noted below.    · Breast cancer screening     V76.10/Z12.39    A.) Patient is already scheduled for 2020 screening mammogram; he is also followed by heme-onc.     · Colon cancer screening     V76.51/Z12.11    A.) Colonoscopy from 2017 will be uploaded to chart.      Problems Reconciled  Plan  · Orders  o CMP [facility] (90205) - 401.1/I10 - 01/10/2020  o Hgb A1c [facility] (31709) - 250.00/E11.9 - 01/10/2020  o Lipid Panel [facility] (18474) - 272.4/E78.5 - 01/10/2020  o ACO-14: Influenza immunization administered or previously received () - V04.81/Z23 - 01/10/2020  o ACO-18: Positive screen for clinical depression using a standardized tool and a follow-up plan documented () - 311/F32.9 - 01/10/2020   PHQ-9 score of 5; patient declines additional intervention at this time.  o ACO-19: Colorectal cancer screening results documented and reviewed (3017F) - - 01/10/2020  o ACO-20: Screening Mammography documented and reviewed (, 3014F) - V76.10/Z12.39 - 01/10/2020  o ACO-27: Most recent 2019 HgbA1c less than 7 [facility] (3044F) - 250.00/E11.9 - 01/10/2020  o ACO-39: Current medications updated and reviewed () - - 01/10/2020  o ACO-41: Dilated Diabetic eye exam completed this year and results in chart/reviewed (2022F) - 250.00/E11.9 - 01/10/2020  o Fluzone Quadrivalent Vaccine, age 3+ (67302) - V04.81/Z23 - 01/10/2020   Vaccine - Influenza; Dose: 0.5; Site: Right Deltoid; Route: Intramuscular; Date: 01/10/2020 10:44:00; Exp: 06/30/2020; Lot: [id number]; Mfg: [employer]ur; TradeName: FLUZONE; Administered By:  Amina Levy MA; Comment: [id number]  o CBC with Manual Diff if indicated [facility] (87068, 98723) - 401.1/I10 - 01/10/2020  o TSH + free t4 (35565, 51948) - 244.9/E03.9 - 01/10/2020  · Medications  o anastrozole 1 mg oral tablet   SIG: take 1 tablet (1 mg) by oral route once daily for 90 days   DISP: (90) tablets with 1 refills  Refilled on 01/10/2020     o rosuvastatin 40 mg oral tablet   SIG: take 1 tablet (40 mg) by oral route once daily for 90 days   DISP: (90) tablets with 1 refills  Refilled on 01/10/2020     o telmisartan 40 mg oral tablet   SIG: take 1 tablet (40 mg) by oral route once daily for 90 days   DISP: (90) tablet with 1 refills  Refilled on 01/10/2020     o Medications have been Reconciled  o Transition of Care or Provider Policy  · Instructions  o Advised that cheeses and other sources of dairy fats, animal fats, fast food, and the extras (candy, pasteries, pies, doughnuts and cookies) all contain LDL raising nutrients. Advised to increase fruits, vegetables, whole grains, and to monitor portion sizes.   o Take all medications as prescribed/directed.  o Patient was educated/instructed on their diagnosis, treatment and medications prior to discharge from the clinic today.  o Follow-up in 3 months            Electronically Signed by: Jesica Alejandra DO -Author on January 10, 2020 12:09:16 PM

## 2021-05-07 NOTE — PROGRESS NOTES
Progress Note      Patient Name: Fiona Portillo   Patient ID: [id number]   Sex: Female   YOB: 1965        Visit Date: March 11, 2019    Provider: Charmaine Mena MD   Location: [facility]   Location Address: [address] [place] [address]  [id number]   Location Phone: [phone]          Chief Complaint     med refills.   also has cough, hot flashes, achy, feeling bad.       History Of Present Illness  Fiona Portillo is a 53 year old /White female who presents for evaluation and treatment of:      She started symptoms on Saturday.  She has had head congestion and felt tired .  She was better and then started symptoms again.    She was scheduled for refills.  She is diabetic and needs her refills.  She needs micardis for kidney protection, not BP.  She had the cough with lisinopril.  Her sugar was dropping on Thursday when she didn't feel like eating.  The next day was 74.  She stopped her medications for a few days.    She goes to the [facility] exam every August.       Past Medical History  Disease Name Date Onset Notes   Breast Cancer --  --    Depression --  --    Diabetes mellitus, type 2 --  --    Hyperlipidemia --  --    Hypertension, Benign Essential --  --    Hypothyroid --  --    Sleep apnea --  --          Past Surgical History  Procedure Name Date Notes   Breast Lumpectomy --  --    Tonsillectomy --  --    Tubal ligation --  1994         Medication List  Name Date Started Instructions   anastrozole 1 mg oral tablet 08/20/2018 take 1 tablet (1 mg) by oral route once daily for 90 days   Effexor XR 75 mg oral capsule,extended release 24hr 12/12/2018 take 3 capsules (225 mg) by oral route once daily Start with 1 pill x 3 days , then 2 pills x 3 days then 3 pills qd thereafter   ergocalciferol (vitamin D2) 50,000 unit oral capsule 10/08/2018 take 1 capsule by oral route once daily for 90 days   fexofenadine 180 mg oral tablet  take 1 tablet (180 mg)  by oral route once daily   FreeStyle Lancets 28 gauge miscellaneous misc  use as directed   Janumet 50-1,000 mg oral tablet 01/28/2019 take 1 tablet by oral route 2 times per day with meals for 90 days   krill oil-omega-3-dha-epa 300-90 (27-45) mg oral capsule  --    levothyroxine 75 mcg oral tablet 10/08/2018 take 1 tablet (75 mcg) by oral route once daily for 90 days   rosuvastatin 40 mg oral tablet 08/20/2018 take 1 tablet (40 mg) by oral route once daily for 90 days   telmisartan 40 mg oral tablet  take 1 tablet (40 mg) by oral route once daily         Allergy List  Allergen Name Date Reaction Notes   lisinopril --  --  --          Family Medical History  Disease Name Relative/Age Notes   DM Type II / Father; Mother; Grandmother (paternal); Sister    Father/     Grandmother (paternal)/     Mother/     Sister/    Family History Of Breast Cancer / Mother    Mother/    Father, Grandfather, or Brother developed Heart Disease before the age of 65 / --    Hyperlipidemia / Father; Mother    Father/     Mother/    Hypertension / Mother    Mother/    Mother, Grandmother, or Sister developed Heart Disease before the age of 65 / --          Social History  Finding Status Start/Stop Quantity Notes   Alcohol Former --/-- --  drank alcohol in the past, 7 or less drinks per week   Exercises regularly --  --/-- --  1-2 times per week   Recreational Drug Use Never --/-- --  never used   Tobacco Never --/-- --  never smoker   Uses seatbelts --  --/-- --  yes         Immunizations  NameDate Admin Mfg Trade Name Lot Number Route Inj VIS Given VIS Publication   Hrpsucefa22/08/2018 PMC Fluzone Quadrivalent [id number] IM  10/08/2018 08/07/2015   Comments: ndc [id number]   Vaapnbysj3163/24/2015 MSD Pneumovax 23 [id number] NE NE 12/12/2018 04/24/2015   Comments:    Tdap02/24/2015 PMC ADACEL [id number] NE NE 12/12/2018 02/24/2015   Comments:          Vitals  Date Time BP Position Site L\R Cuff Size HR RR TEMP(F) WT  HT  BMI kg/m2 BSA m2 O2 Sat         03/11/2019 08:40 /76 Sitting    124 - R  97.8 173lbs 4oz    97 %           Physical Examination  · Constitutional  o Appearance  o : well developed, well-nourished, in no acute distress  · Head and Face  o HEENT  o : TM's clear Throat clear   · Eyes  o Conjunctivae  o : conjunctivae normal  · Neck  o Inspection/Palpation  o : supple  o Thyroid  o : no thyromegaly  · Respiratory  o Respiratory Effort  o : breathing unlabored  o Auscultation of Lungs  o : clear to ascultation  · Cardiovascular  o Heart  o :   § Auscultation of Heart  § : regular rate and rhythm  o Peripheral Vascular System  o :   § Extremities  § : no edema  · Lymphatic  o Neck  o : no lymphadenopathy present  · Musculoskeletal  o General  o :   § General Musculoskeletal  § : grossly normal.  · Skin and Subcutaneous Tissue  o General Inspection  o : normal  · Neurologic  o Gait and Station  o :   § Gait Screening  § : normal gait  · Psychiatric  o Mood and Affect  o : mood normal, affect appropriate          Assessment  · Diabetes mellitus, type 2     250.00/E11.9  · Hyperlipidemia     272.4/E78.5  · Hypothyroidism     244.9/E03.9      Plan  · Orders  o Hgb A1c [facility] ([id number]) - 250.00/E11.9 - 03/11/2019  o Lipid Panel [facility] (79731) - 250.00/E11.9 - 03/11/2019  o Hgb A1c [facility] (05829) - 250.00/E11.9 - 03/11/2019  o TSH [facility] (73882) - 244.9/E03.9 - 03/11/2019  o Urine microalbumin (78499) - 250.00/E11.9 - 03/11/2019  o ACO-39: Current medications updated and reviewed () - - 03/11/2019  o IOP - Influenza A/B Test (66180) - - 03/11/2019   FLU A: NEGATIVE FLU B: POSITIVE  · Medications  o anastrozole 1 mg oral tablet   SIG: take 1 tablet (1 mg) by oral route once daily for 90 days   DISP: (90) tablets with 1 refills  Adjusted on 03/11/2019     o levothyroxine 75 mcg oral tablet   SIG: take 1 tablet (75 mcg) by oral route once daily for 90 days   DISP: (90) tablets with 1 refills  Adjusted on 03/11/2019     o rosuvastatin 40 mg oral tablet    SIG: take 1 tablet (40 mg) by oral route once daily for 90 days   DISP: (90) tablets with 1 refills  Adjusted on 03/11/2019     o telmisartan 40 mg oral tablet   SIG: take 1 tablet (40 mg) by oral route once daily for 90 days   DISP: (90) tablet with 1 refills  Adjusted on 03/11/2019     · Instructions  o Advised that cheeses and other sources of dairy fats, animal fats, fast food, and the extras (candy, pasteries, pies, doughnuts and cookies) all contain LDL raising nutrients. Advised to increase fruits, vegetables, whole grains, and to monitor portion sizes.   o Patient was educated/instructed on their diagnosis, treatment and medications prior to discharge from the clinic today.            Electronically Signed by: Charmaine Mean MD -Author on March 11, 2019 11:43:12 AM

## 2021-05-09 VITALS
WEIGHT: 177 LBS | DIASTOLIC BLOOD PRESSURE: 70 MMHG | OXYGEN SATURATION: 98 % | HEART RATE: 104 BPM | SYSTOLIC BLOOD PRESSURE: 110 MMHG | TEMPERATURE: 97.5 F

## 2021-05-09 VITALS
BODY MASS INDEX: 32.92 KG/M2 | SYSTOLIC BLOOD PRESSURE: 110 MMHG | DIASTOLIC BLOOD PRESSURE: 62 MMHG | TEMPERATURE: 97.4 F | OXYGEN SATURATION: 98 % | WEIGHT: 174.37 LBS | HEART RATE: 110 BPM | HEIGHT: 61 IN

## 2021-05-09 VITALS
OXYGEN SATURATION: 98 % | DIASTOLIC BLOOD PRESSURE: 76 MMHG | HEIGHT: 61 IN | SYSTOLIC BLOOD PRESSURE: 110 MMHG | WEIGHT: 174 LBS | BODY MASS INDEX: 32.85 KG/M2 | TEMPERATURE: 97 F

## 2021-05-09 VITALS
WEIGHT: 173.25 LBS | HEART RATE: 124 BPM | OXYGEN SATURATION: 97 % | DIASTOLIC BLOOD PRESSURE: 76 MMHG | TEMPERATURE: 97.8 F | SYSTOLIC BLOOD PRESSURE: 114 MMHG

## 2021-05-09 VITALS
BODY MASS INDEX: 33.99 KG/M2 | DIASTOLIC BLOOD PRESSURE: 70 MMHG | TEMPERATURE: 97 F | HEART RATE: 107 BPM | HEIGHT: 61 IN | SYSTOLIC BLOOD PRESSURE: 120 MMHG | OXYGEN SATURATION: 94 % | WEIGHT: 180 LBS

## 2021-05-09 VITALS
BODY MASS INDEX: 33.23 KG/M2 | TEMPERATURE: 96.9 F | DIASTOLIC BLOOD PRESSURE: 72 MMHG | WEIGHT: 176 LBS | OXYGEN SATURATION: 98 % | HEIGHT: 61 IN | SYSTOLIC BLOOD PRESSURE: 110 MMHG | HEART RATE: 89 BPM

## 2021-05-09 VITALS
BODY MASS INDEX: 32.57 KG/M2 | SYSTOLIC BLOOD PRESSURE: 110 MMHG | TEMPERATURE: 96.9 F | DIASTOLIC BLOOD PRESSURE: 62 MMHG | HEIGHT: 61 IN | WEIGHT: 172.5 LBS | HEART RATE: 118 BPM | OXYGEN SATURATION: 99 %

## 2021-05-09 VITALS
DIASTOLIC BLOOD PRESSURE: 80 MMHG | WEIGHT: 173.37 LBS | OXYGEN SATURATION: 99 % | HEART RATE: 102 BPM | SYSTOLIC BLOOD PRESSURE: 130 MMHG | TEMPERATURE: 97.2 F

## 2021-05-09 VITALS
HEART RATE: 100 BPM | DIASTOLIC BLOOD PRESSURE: 60 MMHG | SYSTOLIC BLOOD PRESSURE: 100 MMHG | OXYGEN SATURATION: 99 % | TEMPERATURE: 96.8 F

## 2021-05-09 VITALS
TEMPERATURE: 97.3 F | BODY MASS INDEX: 33.28 KG/M2 | HEART RATE: 97 BPM | OXYGEN SATURATION: 99 % | DIASTOLIC BLOOD PRESSURE: 68 MMHG | SYSTOLIC BLOOD PRESSURE: 124 MMHG | WEIGHT: 176.25 LBS | HEIGHT: 61 IN

## 2021-05-09 VITALS
HEART RATE: 102 BPM | BODY MASS INDEX: 33.61 KG/M2 | DIASTOLIC BLOOD PRESSURE: 72 MMHG | HEIGHT: 61 IN | WEIGHT: 178 LBS | TEMPERATURE: 97.8 F | SYSTOLIC BLOOD PRESSURE: 120 MMHG | OXYGEN SATURATION: 98 %

## 2021-05-09 VITALS
TEMPERATURE: 97.8 F | OXYGEN SATURATION: 95 % | SYSTOLIC BLOOD PRESSURE: 140 MMHG | HEART RATE: 56 BPM | DIASTOLIC BLOOD PRESSURE: 80 MMHG | WEIGHT: 178 LBS

## 2021-05-09 VITALS
WEIGHT: 174.12 LBS | OXYGEN SATURATION: 97 % | HEIGHT: 61 IN | DIASTOLIC BLOOD PRESSURE: 70 MMHG | HEART RATE: 107 BPM | BODY MASS INDEX: 32.87 KG/M2 | TEMPERATURE: 97.2 F | SYSTOLIC BLOOD PRESSURE: 110 MMHG

## 2021-05-09 VITALS
WEIGHT: 180 LBS | OXYGEN SATURATION: 98 % | DIASTOLIC BLOOD PRESSURE: 70 MMHG | SYSTOLIC BLOOD PRESSURE: 116 MMHG | TEMPERATURE: 97.7 F | HEART RATE: 113 BPM

## 2021-05-09 VITALS
OXYGEN SATURATION: 99 % | SYSTOLIC BLOOD PRESSURE: 108 MMHG | HEART RATE: 94 BPM | DIASTOLIC BLOOD PRESSURE: 80 MMHG | WEIGHT: 174.25 LBS | TEMPERATURE: 97.6 F

## 2021-05-22 ENCOUNTER — TRANSCRIBE ORDERS (OUTPATIENT)
Dept: ADMINISTRATIVE | Facility: HOSPITAL | Age: 56
End: 2021-05-22

## 2021-05-22 DIAGNOSIS — Z12.31 BREAST CANCER SCREENING BY MAMMOGRAM: Primary | ICD-10-CM

## 2021-05-28 VITALS
WEIGHT: 175.93 LBS | TEMPERATURE: 97.3 F | SYSTOLIC BLOOD PRESSURE: 110 MMHG | SYSTOLIC BLOOD PRESSURE: 97 MMHG | OXYGEN SATURATION: 97 % | HEART RATE: 98 BPM | RESPIRATION RATE: 16 BRPM | OXYGEN SATURATION: 98 % | TEMPERATURE: 97.1 F | DIASTOLIC BLOOD PRESSURE: 52 MMHG | WEIGHT: 177.03 LBS | HEART RATE: 102 BPM | BODY MASS INDEX: 33.24 KG/M2 | DIASTOLIC BLOOD PRESSURE: 71 MMHG | BODY MASS INDEX: 33.45 KG/M2

## 2021-05-28 VITALS
WEIGHT: 178.57 LBS | HEIGHT: 60 IN | HEART RATE: 117 BPM | DIASTOLIC BLOOD PRESSURE: 78 MMHG | OXYGEN SATURATION: 97 % | TEMPERATURE: 97.3 F | BODY MASS INDEX: 35.06 KG/M2 | SYSTOLIC BLOOD PRESSURE: 115 MMHG

## 2021-05-28 VITALS
SYSTOLIC BLOOD PRESSURE: 106 MMHG | DIASTOLIC BLOOD PRESSURE: 70 MMHG | HEIGHT: 60 IN | WEIGHT: 176.59 LBS | HEART RATE: 111 BPM | WEIGHT: 173.28 LBS | TEMPERATURE: 98.3 F | OXYGEN SATURATION: 97 % | TEMPERATURE: 97.7 F | TEMPERATURE: 98 F | BODY MASS INDEX: 33.54 KG/M2 | OXYGEN SATURATION: 97 % | BODY MASS INDEX: 34.67 KG/M2 | DIASTOLIC BLOOD PRESSURE: 70 MMHG | HEART RATE: 109 BPM | SYSTOLIC BLOOD PRESSURE: 113 MMHG | HEART RATE: 85 BPM | HEIGHT: 60 IN | DIASTOLIC BLOOD PRESSURE: 66 MMHG | OXYGEN SATURATION: 97 % | RESPIRATION RATE: 16 BRPM | SYSTOLIC BLOOD PRESSURE: 118 MMHG | RESPIRATION RATE: 16 BRPM | BODY MASS INDEX: 32.74 KG/M2 | WEIGHT: 170.86 LBS

## 2021-05-28 VITALS
WEIGHT: 177.91 LBS | BODY MASS INDEX: 34.93 KG/M2 | HEART RATE: 90 BPM | SYSTOLIC BLOOD PRESSURE: 109 MMHG | TEMPERATURE: 97.6 F | OXYGEN SATURATION: 97 % | HEIGHT: 60 IN | DIASTOLIC BLOOD PRESSURE: 69 MMHG

## 2021-05-28 NOTE — PROGRESS NOTES
Patient: SHANA FISHER     Acct: [id number]     Report: #[id number]  UNIT #: [id number]     : 1965    Encounter Date:2018  PRIMARY CARE: ANASTASIA THAPA DO  ***Signed***  --------------------------------------------------------------------------------------------------------------------  Chief Complaint      Mar 19, 2018      Breast Cancer; Right lumpectomy            Vitals      Height 5 ft  / 152.4 cm      Weight 178 lbs 9.162 oz / 81.0 kg      BSA 1.89 m2      BMI 34.9 kg/m2      Temperature 97.3 F / 36.28 C - Temporal      Pulse 117      Blood Pressure 115/78 Sitting, Left Arm      Pulse Oximetry 97%, ROOM AIR            General Information      Primary Provider:  [facility]      Provider Not Found in Lookup:  Capt. Krupa Diamond            Allergies      Coded Allergies:             LISINOPRIL (Verified  Adverse Reaction, Unknown, cough, 3/19/18)            Medications      Last Reconciled on 3/19/18 15:06 by SYED MIRZA MD      Venlafaxine HCl (Effexor) 75 Mg Tablet      75 MG PO TID for 30 Days, #90 TAB 1 Refill         Prov: Syed Mirza         17       Levothyroxine (Synthroid) 125 Mcg Tablet      75 MG PO QDAY@07, #30 TAB 0 Refills         Reported         17       Anastrozole (Arimidex*) 1 Mg Tablet      1 MG PO QDAY, #30 TAB         Reported         17       metFORMIN HCl/sitaGLIPtin Phos (Janumet  MG) 1 Tab Tablet      1 TAB PO QDAY, TAB         Reported         12/24/15       Rosuvastatin Calcium (Crestor*) 40 Mg Tablet      40 MG PO HS, #30 TAB 0 Refills         Reported         12/24/15       Telmisartan (Micardis*) 40 Mg Tab      40 MG PO QDAY, TAB         Reported         12/24/15       Fexofenadine Hcl (Fexofenadine Hcl) 180 Mg Tablet      180 MG PO HS, TAB 0 Refills         Reported         12/15/15      Current Medications      Current Medications Reviewed 3/19/18            Pain and Fatigue Scales      Pain Assessment:           Experiencing any  pain?:  No      Fatigue:           Experiencing any fatigue?:  No            Other      Clinical Trial Participant?:  No            Past Medical History      No Diabetes Type 1, Yes Diabetes Type 2, Yes Hypertension, No Depression, No     Seizures, No Arthritis, No Osteoporosis, Yes Sleep apnea      Hematology/oncology:  REPORTS HX OF: Breast cancer, DENIES HX OF: Anemia,     Bladder Cancer, Blood cancer, Brain cancer, Cervical cancer, Colorectal cancer,     Endocrine cancer, Eye cancer, GI cancer,  cancer, Kidney cancer, Leukemia,     Leukocytosis, Liver cancer, Lung cancer, Lymphoma, Musculoskeletal cancer,     Myeloma, Neurologic cancer, Oral cancer, Ovarian cancer, Skin cancer, Stomach     cancer, Thrombocytopenia, Thyroid cancer, Uterine cancer, Other cancer history,     Other hematologic history      Genetic/metabolic:  DENIES HX OF: Cystic fibrosis, Down syndrome, Other genetic     history, Other metabolic history            Past Surgical History      REPORTS HX OF: VAD Placement (port placed and removed), Biopsy, Other Past     Surgical Hx (lumpectomy), DENIES HX OF: Cataract extraction, Thyroid surgery,     Lung biopsy, CABG surgery, Coronary stent, Valve replacement, Appendectomy,     Cholecystectomy, Splenectomy, Bladder surgery, Nephrectomy, Joint replacement,     Frature repair, Skin cancer removal, Melanoma excision, Spinal surgery, Breast     biopsy, Mastectomy, bilateral, Mastectomy, right, Mastectomy, left, Hysterectomy    , Peg Tube Placement            Family History      REPORTS HX OF: Breast cancer (mother), Other Cancer History (kidney cancer,     father), DENIES HX OF: Blood disorders, Cervical cancer, Colorectal cancer,     Leukemia, Lung cancer, Melanoma, Ovarian cancer, Prostate cancer, Uterine cancer            Social History      Yes            Tobacco Use      Tobacco status:  Never smoker            Alcohol Use      Alcohol intake:  None      Counseling given:  None             Substance Use      Substance use:  Denies use            Past Oncology Illness History      Mrs. Fiona Portillo is a very pleasant 52-year-old  female with recent     abnormal mammogram. Her mammogram was performed at the [facility] on     11/02/2015 which showed a 1.3 cm mass in the right breast in the upper inner     quadrant approximately 6 cm from the nipple with spiculation. She then     underwent an ultrasound-guided core needle biopsy of the right breast on 11/10/    2015. Immunohistochemical staining revealed an invasive ductal carcinoma with     ER 70%, IA less than 1%, and HER-2/dejah equivocal at 2+ with a subsequent     positive FISH for HER2.            She then underwent a right sided lumpectomy and sentinel lymph node biopsy on 11 /23/2015. This revealed a 1.3 cm invasive ductal carcinoma, grade 2. The tumor     was unifocal and associated DCIS was present. All margins were negative. 4     sentinel lymph nodes were removed and fortunately zero out of four were     involved with any metastatic deposit. This was staged as a pT1c pN0(sn)     invasive ductal carcinoma.            She was then referred to my clinic for additional discussion of adjuvant     treatment options for her HER-2 positive breast cancer. She was previously     discussed at the [facility] tumor board on 12/11/2015. At her     initial clinic appointment on 12/15/2015 she had no complaints and was at her     normal baseline state of health.            Interim:      She reports some hot flashes mainly at night. This has improved on Effexor. She     is taking Arimidex although needs a refill. She has no new complaints. No new     breast lesions.            Interim history:  11/20/17      4 month follow up for breast cancer on Arimidex. She denies new pain or cough.     She states she is on Effexor 150 mg daily but still having hot flashes day and     night. No new breast masses or lesions.              Interim history: 12/18/17      She presents for follow-up ahead of schedule as her Effexor dose had been     increased. She states that her hot flashes have improved on this new dose. She     reports that she has a new PCP who is worried about her creatinine of 1.3 and     decreased GFR. She does state that she has a history of diabetes that is not     optimally controlled. She denies any new breast lesions. She denies any new     skin changes.            Interim history:  3/19/18      3 month follow up for breast cancer on Arimidex. She states her hot flashes are     improved to tolerable on Effexor at the increased dose. Renal ultrasound was     performed by a nephrologist which showed normal kidneys but liver steatosis. No     new breast lesions or lumps. No pain.            OTHER:      MOST RECENT MAMMOGRAM: ORDERED ON 11/20/17      MOST RECENT DEXA:  ordered 3/19/18            ROS      General:  Denies: Fatigue      Eyes:  Complains of: Corrective lenses      Ears, nose, mouth, throat:  Denies: Seizures      Cardiovascular:  Denies: Irregular heartbeat      Respiratory:  Denies: Productive cough      Gastrointestinal:  Denies: Problem swallowing      Kidney/Bladder:  Denies: Change in urinary stream      Musculoskeletal:  Denies: Leg Cramps      Skin:  DENIES: Easy Bleeding      Neurological:  Denies: Fainting      Psychiatric:  Complains of: AAO X 3      Endocrine:  Denies Diabetes      Hematologic/lymphatic:  Denies: Bruising      Reproductive:  Denies Pregnant            General Appearance:  Alert, Oriented X3, No acute distress      Eyes:  Anicteric Sclerae, Moist Conjunctiva      HEENT:  Orophraynx clear, No Erythema      Respiratory:  CTAB, No Diminished Breath      Abdomen\Gastro:  Soft, No Masses      Cardio:  RRR, No Murmur, No, Peripheral Edema      Skin:  Normal Temperature, Normal Tone, No Rash, lesions, ulcers      Psychiatric:  Appropriate Affect, AAO x3      Neuro:  Cranial Nerve II-XII Inta,  No Focal Sensory Deficit      Muscularskeletal:  Normal Gait and Station, Full ROM of extremeties      Extremities:  No Digital Cyanosis, No Digital Ischemia      Lymphatic:  No Cervical, No Supraclavicular, No Axillary            Current Plan      I reviewed the results of her previous imaging and surgical pathology. I     reviewed with her that she was noted to have a stage IA pT1c pNo(sn) cM0 G2 1.3     cm invasive ductal carcinoma of the right breast. I note immunohistochemical     staining revealed that this was ER 70%, ME <1%, HER2/ADELA 2+ (Positive FISH). As     noted she underwent lumpectomy on 11/23/15.            I reviewed with her the NCCN Invasive Breast Cancer Guidelines Version 1.2016.     I reviewed with her that for all hormone receptor positive HER-2 positive     breast cancers greater than 1 cm it is a NCCN category 1 recommendation to     receive curative intent adjuvant chemotherapy with a trastuzumab based regimen     plus adjuvant endocrine therapy. I also counseled her that as she is status     post lumpectomy she would require adjuvant radiation to the involved breast     which would be performed after the completion of chemotherapy.            I counseled her that she has a breast cancer before the age of 50 and also a     first-degree relative with breast cancer she would benefit from hereditary     genetic testing in today have performed a [employer] panel for further     evaluation. Should she be found to harbor a deleterious mutation that puts her     at increased risk of subsequent development of breast cancer (such as BRCA1 or     BRCA2) she may also require additional surgery such as bilateral prophylactic     mastectomy. However, I did  her that statistically speaking the     likelihood is that she will not harbor a deleterious mutation.            I reviewed with her the risks and potential complications of chemotherapy. I     reviewed with her that the NCCN has two  preferred regimens for HER-2 positive     disease. As she has negative seth involvement I favor the use of TCH+P. I.     reviewed the risks and potential complications of each one of these agents and     she voiced understanding. I also reviewed with her that per NCCN guideline     recommendations she would require a total of one year of Herceptin treatment     which includes the 6 cycles of TCH+P. she voiced understanding of these     recommendations and was in agreement. I counseled her that we would likely wait     an additional 2 weeks to give her a total of one month of healing after her     lumpectomy.            In preparation for chemotherapy she will require a Port-A-Cath and she has been     referred for this to be placed in the near future. She also report require a     baseline echocardiogram this is also been completed.            As noted she would require adjuvant radiation to the involved breast after     conclusion of chemotherapy. She then would require endocrine therapy at the     conclusion of radiation.            She presents today after cycle #6 of 6 of TCH+P chemotherapy completed on 04/20/ 16 for continuation of treatment with single agent Herceptin. Herceptin     completed 01/2017. Toxicity monitoring. Labs reviewed.            Kevin returned as negative for any deleterious mutation. As noted she will     require adjuvant radiation after treatment. She will require endocrine therapy     after radiation. She was given Arimidex 1 mg PO daily and instructed to start     this treatment the day after completion of XRT. She started Arimidex on 06/25/ 16. She is taking Effexor 150 mg PO daily for hot flashes which is now stable.     Will continue Arimidex x 5 years per NCCN recs.            On 07/13/16 she reported a new lump under her lumpectomy scar. I noted that she     had negative but close margins. I referred her for breast imaging including     breast MRI and this lesion was deemed  benign on imaging. Will monitor. Stable.            I had recommended Imodium for diarrhea. Medical management for heartburn. These     issues have improved and resolved. Her weight is stable since starting     treatment. Resolved. Monitor.            I have again encouraged exercise and activity for her fatigue. Improved. I have     recommended allergy medication for her seasonal allergies. Stable.            Neutropenia noted at toxicity check which had worsened. Neulasta added to last     2 cycles of cytotoxic chemotherapy. Will monitor and check prior to each     treatment. She reports worse muscle and bone pain after Neulasta but states     this is tolerable. I have recommended Claritin and Tylenol. Should improve off     treatment. Improved.            She will complete a total of 1 year of Herceptin per NCCN guideline     recommendations.            Previous hospital stay for UTI noted. This has improved. Resolved.            Will restart yearly mammograms in December or January (6 months after     completion XRT). Mammogram Dec 2016 benign per her report. Performed at [facility]. She will have this again at [facility].            On Arimidex and tolerating.  Will f/u in 3 months. Bone densitometry showed     Osteopenia. Have recommended to continue Calcium + Vit D. Repeat DEXA 2018.     Arimidex renewed.            Has had port removed and C-scope completed.  She was given copies of all     pertinent NCCN guidelines discussed at her initial appointment.            Plan:      She is on Arimidex which we will continue per guideline recommendations. As     noted she had excessive hot flashes so I increased her Effexor dose to 225 mg.     She states that this has improved her hot flashes and we will continue this     dose. Mammogram 11/21/17 was reviewed at bedside which was benign. We will     perform repeat DEXA in 2018 as noted. Will continue calcium+D for osteopenia.            Current Plan      3 month  follow up for breast cancer. She is taking Arimidex daily with minimal     to no side effects.  The increased Effexor dose has improved her hot flashes     and will be continued. I reviewed her renal ultrasound which showed hepatic     steatosis and I asked her to discuss this with her PCP.  Follow up in 4 months     with a dexa scan prior to appointment. Will continue the AI.            Available for immediate release. Please forward a copy of this dictation to Dr. Cardenas and Dr. Chance Rich.      HER2-positive carcinoma of right breast - C50.911            Invasive ductal carcinoma of right breast, stage 1 - C50.911            Aromatase inhibitor use - Z79.811            History of lumpectomy of right breast - Z98.89            Hepatic steatosis - K76.0            Notes      New Diagnostics      * Bone Densitometry DEXA, 3 Months       Dx: Aromatase inhibitor use - Z79.811      Follow-up:  3 Months      Review/Other:  Received Lab Test, Reviewed Radiology Test, Reviewed Medicine     Test      Patient Education Provided:  Yes      ECOG Score:  0      Medications changed:  Medication renewed, Medication Changed            Hx Influenza Vaccination:  Yes      Date Influenza Vaccine Given:  Oct 1, 2017      Influenza Vaccine Declined:  No      2 or More Falls Past Year?:  No      Fall Past Year with Injury?:  No      Hx Pneumococcal Vaccination:  No      Encouraged to follow-up with:  PCP regarding preventative exams.      Chart initiated by      SURJIT VELEZ                 Disclaimer: Converted document may not contain table formatting or lab diagrams. Please see [facility] for the authenticated document.

## 2021-05-28 NOTE — PROGRESS NOTES
Patient: SHANA FISHER     Acct: [id number]     Report: #[id number]  UNIT #: [id number]     : 1965    Encounter Date:2018  PRIMARY CARE: ANASTASIA THAPA DO  ***Signed***  --------------------------------------------------------------------------------------------------------------------  Visit Type      Established Patient Visit            Chief Complaint      Stage IA pT1c pNo(sn) cM0 G2 1.3 cm invasive ductal carcinoma right breast; ER     70%, MO <1%, HER2/ADELA 2+ (Positive FISH) s/p lumpectomy 11/23/15, status post     adjuvant chemotherapy + one year of herceptin, radiation and currently on     endocrine therapy.      Intent of Therapy:  Curative            Referring Provider/Copies To      Referring Provider:  ESTUARDO NEWMAN      Copies To 1:   SUHA GREER; Kiran Maloney; Alex Covington ;            Allergies      Coded Allergies:             LISINOPRIL (Verified  Adverse Reaction, Unknown, cough, 3/19/18)            Medications      Last Reconciled on 3/19/18 15:06 by MALOU MIRZA MD      Levothyroxine (Levothyroxine) 0.112 Mg Tablet      0.112 MG PO QDAY, #30 TAB 0 Refills         Reported         18       Anastrozole (Arimidex*) 1 Mg Tablet      1 MG PO QDAY, #30 TAB         Reported         17       metFORMIN HCl/sitaGLIPtin Phos (Janumet  MG) 1 Tab Tablet      1 TAB PO QDAY, TAB         Reported         12/24/15       Rosuvastatin Calcium (Crestor*) 40 Mg Tablet      40 MG PO HS, #30 TAB 0 Refills         Reported         12/24/15       Telmisartan (Micardis*) 40 Mg Tab      40 MG PO QDAY, TAB         Reported         12/24/15       Fexofenadine Hcl (Fexofenadine Hcl) 180 Mg Tablet      180 MG PO HS, TAB 0 Refills         Reported         12/15/15      Medications Reviewed:  Changes made to meds            History and Present Illness      Past Oncology Illness History      Mrs. Shana Fisher is a very pleasant 52-year-old  female with recent     abnormal  mammogram. Her mammogram was performed at the [facility] on     11/02/2015 which showed a 1.3 cm mass in the right breast in the upper inner     quadrant approximately 6 cm from the nipple with spiculation. She then underwent    an ultrasound-guided core needle biopsy of the right breast on 11/10/2015.     Immunohistochemical staining revealed an invasive ductal carcinoma with ER 70%,     CA less than 1%, and HER-2/dejah equivocal at 2+ with a subsequent positive FISH     for HER2.            She then underwent a right sided lumpectomy and sentinel lymph node biopsy on     11/23/2015. This revealed a 1.3 cm invasive ductal carcinoma, grade 2. The tumor    was unifocal and associated DCIS was present. All margins were negative. 4 senti    he lymph nodes were removed and fortunately zero out of four were involved with    any metastatic deposit. This was staged as a pT1c pN0(sn) invasive ductal     carcinoma.            She was then referred to my clinic for additional discussion of adjuvant     treatment options for her HER-2 positive breast cancer. She was previously     discussed at the [facility] tumor board on 12/11/2015. At her initial    clinic appointment on 12/15/2015 she had no complaints and was at her normal     baseline state of health.            ONCOLOGICAL AND TREATMENT HISTORY:            1. 11/02/15 - Mammogram ([facility]) 1.3 cm mass in right breast     upper inner quadrant            2. 11/10/15 - Ultrasound guided biopsy - IDC ER (70%), CA (<1%) and Her-2 dejah     equivocal at 2+ with subsequent FISH positive            3. 11/23/16 - Right sided lumpectomy - 1.3 cm IDC grade 2, tumor was unifocal     and associated DCIS was present. All margins were negative. 4 sentinel lymph     nodes were removed and fortunately zero out of four were involved with any     metastatic deposit. This was staged as a pT1c pN0(sn) invasive ductal carcinoma     (Stage 1A)            4.  12/30/15 - TCH+P initiated.  Completed cycle 6 on 04/20/16, will continue     Herceptin x 1 year.            5. 05/10/16 - Radiation initiated, completed 06/24/16.             6. 06/25/16 - Arimidex initiated for 5 years.            7. 07/13/16 - Mammogram done for right breast lump in scar line.            8. 07/19/16 - MRI of breasts - benign bilateral breast MRI, postop changes,     suggest surgical cnosult biopsy of suspicious mass, deemed begn radiographically    according to note on 8/3/16.            9. 02/01/17 - Colonoscopy - benign            10. 04/14/17 - Bone densitometry - osteopenia of lumbar spine and hip            11.11/21/17 - Mammogram - benign, scattered areas of fibroglandular density.             12.07/20/18 - Bone densitometry - osteopenia of lumbar spine and worsening     osteopenia in hip compared to April 2017.            HPI - Oncology Interim      Presents in follow up on her hormone receptor positive, Her-2 dejah positive right    breast cancer diagnosed in November 2015.  Currently taking Arimidex.  Has hot     flashes but taking over the counter 5-HTP which she thinks has helped with mood     swings and hot flashes.  Bone densitometry done July 2018 with worsening     osteopenia noted in hips.  Has osteopenia in spine as well.  Taking 50,000 IU of    Vit D weekly.  Not taking Calcium.  Mammogram done November 2017 and benign.      Denies headaches, hoarseness, SOA, cough, abdominal pain and pain.            Clinical Trial Participant      No            ECOG Performance Status      0            PAST, FAMILY   Past Medical History      Past Medical History:  No Diabetes Type 1; Diabetes Type 2, Hypertension; No     Depression, No Seizures, No Arthritis, No Osteoporosis; Sleep apnea      Hematology/oncology:  REPORTS HX OF: Breast cancer; DENIES HX OF: Anemia,     Bladder Cancer, Blood cancer, Brain cancer, Cervical cancer, Colorectal cancer,     Endocrine cancer, Eye cancer, GI cancer,   cancer, Kidney cancer, Leukemia,     Leukocytosis, Liver cancer, Lung cancer, Lymphoma, Musculoskeletal cancer,     Myeloma, Neurologic cancer, Oral cancer, Ovarian cancer, Skin cancer, Stomach ca    ncer, Thrombocytopenia, Thyroid cancer, Uterine cancer, Other cancer history,     Other hematologic history      Genetic/metabolic:  DENIES HX OF: Cystic fibrosis, Down syndrome, Other genetic     history, Other metabolic history            Past Surgical History      REPORTS HX OF: VAD Placement (port placed and removed), Biopsy, Other Past     Surgical Hx (lumpectomy); DENIES HX OF: Cataract extraction, Thyroid surgery,     Lung biopsy, CABG surgery, Coronary stent, Valve replacement, Appendectomy, C    holecystectomy, Splenectomy, Bladder surgery, Nephrectomy, Joint replacement,     Frature repair, Skin cancer removal, Melanoma excision, Spinal surgery, Breast     biopsy, Mastectomy, bilateral, Mastectomy, right, Mastectomy, left,     Hysterectomy, Peg Tube Placement            Family History      REPORTS HX OF: Breast cancer (mother), Other Cancer History (kidney cancer,     father); DENIES HX OF: Blood disorders, Cervical cancer, Colorectal cancer,     Leukemia, Lung cancer, Melanoma, Ovarian cancer, Prostate cancer, Uterine cancer            Social History      Lives independently:  Yes            Tobacco Use      Tobacco status:  Never smoker            Alcohol Use      Alcohol intake:  None            Substance Use      Substance use:  Denies use            REVIEW OF SYSTEMS      General:  Denies: Appetite change, Fatigue, Weight loss      Eyes:  Denies: Blurred vision, Corrective lenses      Ears, nose, mouth, throat:  Denies: Headache, Seizures, Visual Changes      Cardiovascular:  Denies: Chest pain, Irregular heartbeat, Palpitations      Respiratory:  Denies: Shortness of Air, Productive cough, Coughing blood      Gastrointestinal:  Denies: Nausea, Vomiting, Constipation, Diarrhea      Kidney/Bladder:   Denies: Painful Urination, Blood in urine      Musculoskeletal:  Denies: New Back pain, Muscle weakness      Skin:  DENIES: Easy Bleeding, Nail changes, Rash      Neurological:  Denies: Dizziness, Numbness\Tingling      Psychiatric:  Complains of: AAO X 3; Denies: Anxiety, Panic attacks      Endocrine:  Denies DiabetesDenies Thyroid Disorder      Hematologic/lymphatic:  Denies: Bruising, Bleeding, Enlarged Lymph Nodes      Reproductive:  Complains of Menopause; Denies Pregnant            VITAL SIGNS,PAIN/FATIGUE SCORE      Vitals      Height 5 ft  / 152.4 cm      Weight 177 lbs 14.580 oz / 80.7 kg      BSA 1.89 m2      BMI 34.7 kg/m2      Temperature 97.6 F / 36.44 C - Temporal      Pulse 90      Blood Pressure 109/69 Sitting, Left Arm      Pulse Oximetry 97%, room air            Pain Score      Experiencing any pain?:  No      Pain Scale Used:  Numerical      Pain Intensity:  0            Fatigue Score      Experiencing any fatigue?:  Yes      Fatigue (0-10 scale):  1            General Appearance:  Alert, Oriented X3, Cooperative, No acute distress      Eyes:  Anicteric Sclerae, Moist Conjunctiva, PERRLA      HEENT:  Orophraynx clear, No Erythema, No Exudates; No Pallor, No Thrush      Neck:  Supple, Full ROM, No Carotid Bruits      Respiratory:  CTAB; No Diminished Breath      Abdomen\Gastro:  Soft, No NABS; No Masses, No Hepatosplenomegaly      Cardio:  RRR, No Murmur, No, Peripheral Edema, Normal PMI      Skin:  Normal Temperature, Normal Texture and Turgor, No Rash, lesions, ulcers      Psychiatric:  Appropriate Affect, Intact Judgement, AAO x3      Neuro:  Cranial Nerve II-XII Inta      Muscularskeletal:  Normal Gait and Station, Full ROM of extremeties, Full muscle    strength\tone      Extremities:  No Digital Cyanosis, Normal Gait and station; No Weakness      Lymphatic:  No Cervical, No Supraclavicular, No Infraclavicular, No Axillary            PREVENTION      Hx Influenza Vaccination:  Yes      Date  Influenza Vaccine Given:  Oct 1, 2017      Influenza Vaccine Declined:  No      2 or More Falls Past Year?:  No      Fall Past Year with Injury?:  No      Hx Pneumococcal Vaccination:  No      Encouraged to follow-up with:  PCP regarding preventative exams.      Chart initiated by      Lorie Levy cma            IMPRESSION/PLAN      Impression      1. Stage 1A right breast cancer and       2. Osteopenia, worsening            Diagnosis      HER2-positive carcinoma of right breast - C50.911            Invasive ductal carcinoma of right breast, stage 1 - C50.911            History of lumpectomy of right breast - Z98.89            Osteopenia         Osteopenia of lumbar spine - M85.88         Osteopenia location: lumbar spine            Use of anastrozole (Arimidex) - Z79.811            Notes      New Medications      * Levothyroxine 0.112 MG TABLET: 0.112 MG PO QDAY #30         Replaced Levothyroxine (Synthroid) 125 MCG TABLET:         75 MG PO QDAY@07 #30      Discontinued Medications      * Venlafaxine HCl (Effexor) 75 MG TABLET: 75 MG PO TID 30 Days #90         Dx: Invasive ductal carcinoma of right breast, stage 1 - C50.911      New Diagnostics      * Screening Mammo, As Soon As Possible         Dx: HER2-positive carcinoma of right breast - C50.911            Plan      1. Discussed bone densitometry performed on 7/20/18 and discussed options for     treatment including but not limited to calcium, weight bearing exercises and     bisphosphonates and RANKL inhibitors.       2. Will continue Vit D 50,000 IU and to start Calcium 500 mg twice a day      3. Prolia 6 mg subcutaneous every 6 months with BMP prior to giving dose.       4. Is up to date on colonoscopy.      5. Mammogram ordered for November 6. Will follow up with Dr. Estrada in 6 months.            Patient Education      Patient Education Provided:  Yes                 Disclaimer: Converted document may not contain table formatting or lab diagrams.  Please see [employer] [facility] for the authenticated document.

## 2021-05-28 NOTE — PROGRESS NOTES
Patient: SHANA FISHER     Acct: [id number]     Report: #[id number]  UNIT #: [id number]     : 1965    Encounter Date:2019  PRIMARY CARE: ANASTASIA THAPA DO  ***Signed***  --------------------------------------------------------------------------------------------------------------------  NURSE INTAKE      Visit Type      Established Patient Visit            Chief Complaint      BREAST CA      Intent of Therapy:  Curative            Referring Provider/Copies To      Referring Provider:  ESTUARDO NEWMAN      Primary Care Provider:  SUHA GREER            History and Present Illness      Past Oncology Illness History      Mrs. Shana Fisher is a very pleasant 52-year-old  female with recent     abnormal mammogram. Her mammogram was performed at the [facility] on     2015 which showed a 1.3 cm mass in the right breast in the upper inner     quadrant approximately 6 cm from the nipple with spiculation. She then underwent    an ultrasound-guided core needle biopsy of the right breast on 11/10/2015.     Immunohistochemical staining revealed an invasive ductal carcinoma with ER 70%,     IL less than 1%, and HER-2/dejah equivocal at 2+ with a subsequent positive FISH     for HER2.            She then underwent a right sided lumpectomy and sentinel lymph node biopsy on     2015. This revealed a 1.3 cm invasive ductal carcinoma, grade 2. The tumor    was unifocal and associated DCIS was present. All margins were negative. 4     sentinel lymph nodes were removed and fortunately zero out of four were involved    with any metastatic deposit. This was staged as a pT1c pN0(sn) invasive ductal     carcinoma.            She was then referred to my clinic for additional discussion of adjuvant     treatment options for her HER-2 positive breast cancer. She was previously     discussed at the [facility] tumor board on 2015. At her initial    clinic appointment on  12/15/2015 she had no complaints and was at her normal     baseline state of health.            ONCOLOGICAL AND TREATMENT HISTORY:            1. 11/02/15 - Mammogram ([facility]) 1.3 cm mass in right breast     upper inner quadrant            2. 11/10/15 - Ultrasound guided biopsy - IDC ER (70%), AL (<1%) and Her-2 dejah     equivocal at 2+ with subsequent FISH positive            3. 11/23/16 - Right sided lumpectomy - 1.3 cm IDC grade 2, tumor was unifocal     and associated DCIS was present. All margins were negative. 4 sentinel lymph     nodes were removed and fortunately zero out of four were involved with any     metastatic deposit. This was staged as a pT1c pN0(sn) invasive ductal carcinoma     (Stage 1A)            4. 12/30/15 - TCH+P initiated.  Completed cycle 6 on 04/20/16, will continue     Herceptin x 1 year.            5. 05/10/16 - Radiation initiated, completed 06/24/16.             6. 06/25/16 - Arimidex initiated for 5 years.            7. 07/13/16 - Mammogram done for right breast lump in scar line.            8. 07/19/16 - MRI of breasts - benign bilateral breast MRI, postop changes,     suggest surgical cnosult biopsy of suspicious mass, deemed begn radiographically    according to note on 8/3/16.            9. 02/01/17 - Colonoscopy - benign            10. 04/14/17 - Bone densitometry - osteopenia of lumbar spine and hip            11.11/21/17 - Mammogram - benign, scattered areas of fibroglandular density.             12.07/20/18 - Bone densitometry - osteopenia of lumbar spine and worsening     osteopenia in hip compared to April 2017.            HPI - Oncology Interim      F/U breast cancer--missed her mammogram in November--promises to get this time-    will r/s.  She is tolerating Arimidex w/o issues--received Prolia today and will    have next inj in 9/2019.  She denies breast changes, nipple drng or skin     alterations at this time.  She is trying to be active and exercise.  No  distress    noted.            Cancer Details            Right breast invasive ductal carcinoma ER/ND+  HER2+            Clinical Staging      Stage IA ([id number])            Treatments      Chemotherapy      12/30/15 thru 4/20/16 completed 6C TCHP--completed 1yr Herceptin      Arimidex     began. 6/25/16       3/14/18 Prolia initiated      Radiation Therapy      5/10/16 thru 6/24/16 completed 5700cGy right breast            Clinical Trial Participant      No            ECOG Performance Status      0            Most Recent Lab Findings      Laboratory Tests      3/7/19 15:25            Laboratory Tests            Test       3/7/19      15:25             Magnesium Level       1.54 mg/dL      (1.60-2.30)            PAST, FAMILY   Past Medical History      Past Medical History:  Diabetes Type 1, High Cholesterol, Hypertension      Hematology/Oncology (F):  Breast Cancer            Past Surgical History      Biopsy (RIGHT), Lumpectomy (RIGHT), VAD Placement (AND REMOVED)      TUBAL LIGATION            Family History      Family History:  Breast Cancer (MOTHER)            Social History      Marital Status:        Lives independently:  Yes      Number of Children:  2      Occupation:  [employer]            Tobacco Use      Tobacco status:  Never smoker            Alcohol Use      Alcohol intake:  None            Substance Use      Substance use:  Denies use            REVIEW OF SYSTEMS      General:  Admits: Fatigue;          Denies: Appetite Change, Fever, Night Sweats, Weight Gain, Weight Loss      Eye:  Denies: Blurred Vision, Corrective Lenses, Diplopia, Eye Irritation, Eye     Pain, Eye Redness, Spots in Vision, Vision Loss      ENT:  Denies: Headache, Hearing Loss, Hoarseness, Seizures, Sinus Congestion,     Sore Throat      Cardiovascular:  Denies: Chest Pain, Edema Ankles, Edema Legs, Irregular     Heartbeat, Palpitations      Respiratory:  Admits: Productive Cough (DRY);          Denies: Coughing Blood,  Shortness of Air, Wheezing      Gastrointestinal:  Admits: Nausea;          Denies: Bloody Stools, Constipation, Diarrhea, Frequent Heartburn, Problem     Swallowing, Tarry Stools, Unable to Control Bowels, Vomiting      Genitourinary (female):  Denies: Blood in Urine, Decrease Urine Stream, Frequent    Urination, Incontinence, Painful Urination      Musculoskeletal:  Denies: Back Pain, Leg Cramps, Muscle Pain, Muscle Weakness,     Painful Joints, Swollen Joints      Integumentary:  Denies: Bleeds Easily, Bruises Easily, Hair Changes, Jaundice,     Lesions, Mole Changes, Nail Changes, Pigment Changes, Rash, Skin Discoloration      Neurologic:  Denies: Dizziness, Fainting, Numbness\Tingling, Paralysis, Seizures      Psychiatric:  Denies: Anxiety, Confused, Depression, Disoriented, Memory Loss      Endocrine:  Admits: Diabetes;          Denies: Cold Intolerance, Excessive Sweating, Excessive Thirst, Excessive     Urination, Heat Intolerance, Flushing, Hyperthyroidism, Hypothyroidism      Hematologic/Lymphatic:  Denies: Bruising, Bleeding, Enlarged Lymph Nodes,     Recurrent Infections, Transfusions            VITAL SIGNS AND SCORES      Vitals      Height 5 ft  / 152.4 cm      Weight 176 lbs 9.415 oz / 80.1 kg      BSA 1.77 m2      BMI 34.5 kg/m2      Temperature 98.3 F / 36.83 C - Temporal      Pulse 85      Respirations 16      Blood Pressure 118/70 Sitting, Left Arm      Pulse Oximetry 97%, RM AIR            Pain Score      Pain Scale Used:  Numerical      Pain Intensity:  0            Fatigue Score      Fatigue (0-10 scale):  3            EXAM      General Appearance:  Positive for: Alert, Oriented x3, Cooperative;          Negative for: Acute Distress      Neck:  Positive for: Supple;          Negative for: JVD, Masses      Respiratory:  Positive for: CTAB, Normal Respiratory Effort      Breast - Left:  Positive for: Adenopathy, Appearance (Normal-appearing female     breast);          Negative for: Discharge,  Mass, Skin Changes      Breast - Right:  Positive for: Appearance (Well-healed incision on the breast     and axilla);          Negative for: Adenopathy, Discharge, Mass, Skin Changes      Abdomen/Gastro:  Positive for: Normal Active Bowel Sounds, Soft;          Negative for: Distention, Hepatosplenomegaly, Tenderness      Cardiovascular:  Positive for: RRR;          Negative for: Gallop, Murmur, Peripheral Edema, Rub      Psychiatric:  Positive for: Appropriate Affect, Intact Judgement      Lymphatic:  Negative for: Axillary, Cervical, Infraclavicular, Supraclavicular            PREVENTION      Date Influenza Vaccine Given:  Dec 1, 2018      Influenza Vaccine Declined:  No      2 or More Falls Past Year?:  No      Fall Past Year with Injury?:  No      Encouraged to follow-up with:  PCP regarding preventative exams.      Chart initiated by      WANDA MASTERS MA            ALLERGY/MEDS      Allergies      Coded Allergies:             LISINOPRIL (Verified  Adverse Reaction, Unknown, cough, 3/14/19)            Medications      Last Reconciled on 3/14/19 14:06 by MARIBEL ARELLANO      Levothyroxine (Levothyroxine) 0.112 Mg Tablet      0.112 MG PO QDAY, #30 TAB 0 Refills         Reported         8/27/18       Anastrozole (Arimidex) 1 Mg Tablet      1 MG PO QDAY, #30 TAB         Reported         1/31/17       metFORMIN HCl/sitaGLIPtin Phos (Janumet 50/1000 MG) 1 Tab Tablet      1 TAB PO QDAY, TAB         Reported         12/24/15       Rosuvastatin Calcium (Crestor*) 40 Mg Tablet      40 MG PO HS, #30 TAB 0 Refills         Reported         12/24/15       Telmisartan (Micardis*) 40 Mg Tab      40 MG PO QDAY, TAB         Reported         12/24/15       Fexofenadine Hcl (Fexofenadine Hcl) 180 Mg Tablet      180 MG PO HS, TAB 0 Refills         Reported         12/15/15      Medications Reviewed:  No Changes made to meds            IMPRESSION/PLAN      Impression      Breast cancer            Diagnosis      History of  lumpectomy of right breast - Z98.890            Invasive ductal carcinoma of right breast, stage 1 - C50.911            Notes      Patient is doing well on adjuvant therapy.  She is due for mammogram which we     ordered her convenience.  Return to clinic for ongoing surveillance.      New Diagnostics      * Screening Mammo, As Soon As Possible         Dx: History of lumpectomy of right breast - Z98.890            Patient Education            Mammography      Patient Education Provided:  Yes                 Disclaimer: Converted document may not contain table formatting or lab diagrams. Please see [employer] [facility] for the authenticated document.

## 2021-05-28 NOTE — PROGRESS NOTES
Patient: FIONA PORTILLO     Acct: [id number]     Report: #[id number]  UNIT #: [id number]     : 1965    Encounter Date:2020  PRIMARY CARE: ANASTASIA THAPA DO  ***Signed***  --------------------------------------------------------------------------------------------------------------------  NURSE INTAKE      Visit Type      Established Patient Visit            Chief Complaint      BREAST CA F/U      Intent of Therapy:  Curative            Referring Provider/Copies To      Primary Care Provider:  ANASTASIA THAPA DO      Copies To:   ANASTASIA THAPA DO            History and Present Illness      Past Oncology Illness History      Mrs. Fiona Portillo is a very pleasant 55-year-old  female with a     mammogram was performed at the [facility] on 2015 which showed     a 1.3 cm mass in the right breast in the upper inner quadrant approximately 6 cm    from the nipple with spiculation. 11/10/2015 ultrasound-guided core needle     biopsy of the right breast revealed invasive ductal carcinoma with ER 70%, KS     less than 1%, and HER-2/dejah equivocal at 2+ with a subsequent positive FISH for     HER2. 2015 right sided lumpectomy and sentinel lymph node biopsy. This     revealed a 1.3 cm invasive ductal carcinoma, grade 2. The tumor was unifocal and    associated DCIS was present. All margins were negative. 4 sentinel lymph nodes     were removed and fortunately zero out of four were involved with any metastatic     deposit. This was staged as a pT1c pN0(sn) invasive ductal carcinoma.            HPI - Oncology Interim      Patient returns for ongoing treatment of her breast cancer and osteopenia.  She     is on Arimidex started .  She is taking her medication daily.  She denies     any problems or side effects.  She denies any masses lymphadenopathy.  No     unusual aches or pains.  She is up-to-date on mammogram.  She notes good energy     level and appetite.  With regard to her  osteopenia she is on Prolia every 6     months.  She is tolerating the injections well.  She denies any dental or jaw p    ain.  She does take calcium and vitamin D daily.  She does try to exercise.            Cancer Details            Right breast invasive ductal carcinoma ER/WI+  HER2+            Clinical Staging      Stage IA (D9tP7P7)            Treatments      Chemotherapy      12/30/15 thru 16 completed 6C TCHP--completed 1yr Herceptin      Arimidex     began. 6/25/16       3/14/18 Prolia initiated      Radiation Therapy      5/10/16 thru 16 completed 5700cGy right breast      Surgeries      2015 right sided lumpectomy and sentinel lymph node biopsy.            Clinical Trial Participant      No            ECOG Performance Status      0            Most Recent Lab Findings      Laboratory Tests      20 12:00            Laboratory Tests            Test       20      07:36             Magnesium Level       1.88 mg/dL      (1.60-2.30)            Most Recent Imaging Findings      Patient: SHANA FISHER   Acct: #[id number]   Report: #[id number]            UNIT #: [id number]    DOS: 20 1358      INSURANCE:[employer]   ORDER #:[id number]      LOCATION:[facility]     : 1965            PROVIDERS      ADMITTING:     ATTENDING: JENNIFER MURPHY      FAMILY:  NONE,MD   ORDERING:  JENNIFER MURPHY         OTHER:    DICTATING:  Pierce Infante MD            REQ #:[id number]   EXAM:OSTEO - BONE DENSITY SCAN 1 SITE      REASON FOR EXAM:        REASON FOR VISIT:  OTHER DISORDER OF BONE            *******Signed******         PROCEDURE:   BONE DENSITY SCAN GREATER THAN OR EQUAL TO ONE SITE             COMPARISON:   [facility], OS, BONE DENSITY SCAN => 1 SITE,     2018, 14:13.             TECHNIQUE:   Lumbar vertebral and proximal femoral dual-energy X-ray     absorptiometry (DXA) was       performed on a [employer] device.        INDICATIONS:    POSTMENOPAUSAL             FINDINGS:   In the lumbar spine, measured from L1-L4, the young adult T score is    -2.1 at L1 otherwise       normal.  According to the World Health Organization criteria, this is classified    as osteopenia at       L1 otherwise normal.                In the femoral necks, the mean young adult T score is -1.0.   In the     trochanters, T score is -0.3.        According to the World Health Organization criteria, this is classified as     borderline osteopenia       in the femoral necks and normal bone density in trochanters.                Using the FRAX scores, which utilize risk factors and femoral neck bone     density, the ten year       probability of a major osteoporotic fracture is 5.1 %  The ten year probability     of a hip fracture       is 0.2 %             CONCLUSION:                 Osteopenia at L1 otherwise normal bone density lumbar spine.             Borderline osteopenia in the femoral necks.  Bone density in the femoral necks     is increased by 4.6       percent compared to previous from 2018.               TEVIN CARBALLO MD             Electronically Signed and Approved By: TEVIN CARBALLO MD on 9/09/2020 at     14:54                               Until signed, this is an unconfirmed preliminary report that may contain      errors and is subject to change.                                              SCHJO:      D:09/09/20 1454            PAST, FAMILY   Past Medical History      Past Medical History:  Diabetes Type 1, High Cholesterol, Hypertension      Hematology/Oncology (F):  Breast Cancer            Past Surgical History      Biopsy, Lumpectomy, VAD Placement            Family History      Family History:  Breast Cancer            Social History      Marital Status:        Lives independently:  Yes      Number of Children:  2      Occupation:  [employer]            Tobacco Use      Tobacco status:  Never smoker            Substance Use      Substance  use:  Denies use            REVIEW OF SYSTEMS      General:  Admits: Fatigue, Weight Gain      Eye:  Admits Corrective Lenses      ENT:  Denies Headache      Respiratory:  Denies: Shortness of Air      Gastrointestinal:  Denies Diarrhea      Genitourinary:  Denies Incontinence      Musculoskeletal:  Denies Muscle Pain, Denies Painful Joints      Neurologic:  Denies Dizziness            VITAL SIGNS AND SCORES      Vitals      Weight 175 lbs 14.833 oz / 79.8 kg      Temperature 97.1 F / 36.17 C - Temporal      Pulse 102      Respirations 16      Blood Pressure 97/52 Sitting, Left Arm      Pulse Oximetry 97%, RM AIR            Pain Score      Pain Scale Used:  Numerical      Pain Intensity:  0            Fatigue Score      Fatigue (0-10 scale):  5            EXAM      General Appearance:  Positive for: Alert, Cooperative;          Negative for: Acute Distress      Neck:  Positive for: Supple;          Negative for: JVD, Masses      Respiratory:  Positive for: CTAB, Normal Respiratory Effort      Breast - Left:  Positive for: Appearance (Normal-appearing female breast);          Negative for: Adenopathy, Discharge, Mass, Skin Changes      Breast - Right:  Positive for: Appearance (Well-healed surgical incision on the     breast and axilla);          Negative for: Adenopathy, Discharge, Mass, Skin Changes      Abdomen/Gastro:  Positive for: Normal Active Bowel Sounds, Soft;          Negative for: Distention, Hepatosplenomegaly, Tenderness      Cardiovascular:  Positive for: RRR;          Negative for: Gallop, Murmur, Peripheral Edema, Rub      Psychiatric:  Positive for: Appropriate Affect, Intact Judgement      Lymphatic:  Negative for: Axillary, Cervical, Infraclavicular, Supraclavicular            PREVENTION      Date Influenza Vaccine Given:  Oct 1, 2019      Influenza Vaccine Declined:  No      2 or More Falls in Past Year?:  No      Fall Past Year with Injury?:  No      Encouraged to follow-up with:  PCP regarding  preventative exams.      Chart initiated by      WANDA MASTERS MA            ALLERGY/MEDS      Allergies      Coded Allergies:             LISINOPRIL (Verified  Adverse Reaction, Unknown, cough, 9/24/20)            Medications      Last Reconciled on 9/24/20 16:02 by JENNIFER MURPHY      Anastrozole (Anastrozole) 1 Mg Tablet      1 MG PO QDAY for 30 Days, #30 TAB 5 Refills         Prov: JENNIFER MURPHY         3/16/20       (Ivokana)   No Conflict Check      QDAY         Reported         3/16/20       SITagliptin (Januvia) 100 Mg Tablet      100 MG PO QDAY, #30 TAB 0 Refills         Reported         9/12/19       Venlafaxine XR (Effexor XR) 150 Mg Cap.er.24h      150 MG PO QDAY for 30 Days, #30 CAP.SR         Reported         9/12/19       Levothyroxine (Levothyroxine) 0.112 Mg Tablet      0.112 MG PO QDAY, #30 TAB 0 Refills         Reported         8/27/18       Rosuvastatin Calcium (Crestor*) 40 Mg Tablet      40 MG PO HS, #30 TAB 0 Refills         Reported         12/24/15       Telmisartan (Micardis) 40 Mg Tab      40 MG PO QDAY, TAB         Reported         12/24/15      Medications Reviewed:  No Changes made to meds            IMPRESSION/PLAN      Diagnosis      Invasive ductal carcinoma of right breast, stage 1 - C50.911      Patient is status post treatments as outlined.  She is now on adjuvant hormone     therapy with anastrozole started 6/16.  Tolerating well.  I see no evidence of     disease recurrence by history, physical examination or recent mammogram.      Continue anastrozole for minimum 5 years.  RTC 6 months for ongoing surveil    jenna.            Osteopenia         Osteopenia, unspecified location         Osteopenia location: unspecified      Patient is on Prolia every 6 months along with daily calcium/vitamin D.  She is     working on exercise including weightbearing.  Recent DEXA scan shows ongoing     osteopenia but improved over previous.  Continue current therapy.            Patient  Education            Aerobic Exercise      Patient Education Provided:  Yes            Electronically signed by JENNIFER MURPHY  09/24/2020 16:02       Disclaimer: Converted document may not contain table formatting or lab diagrams. Please see [employer] for the authenticated document.

## 2021-05-28 NOTE — PROGRESS NOTES
Patient: SHANA PORTILLO     Acct: [id number]     Report: #[id number]  UNIT #: [id number]     : 1965    Encounter Date:2020  PRIMARY CARE: ANASTASIA THAPA DO  ***Signed***  --------------------------------------------------------------------------------------------------------------------  NURSE INTAKE      Visit Type      Established Patient Visit            Chief Complaint      BREAST CANCER            Referring Provider/Copies To      Referring Provider:  ESTUARDO NEWMAN      Primary Care Provider:  ANASTASIA THAPA DO      Copies To:   ANASTASIA THAPA DO            History and Present Illness      Past Oncology Illness History      Mrs. Shana Portillo is a very pleasant 54-year-old  female with a     mammogram was performed at the [facility] on 2015 which showed     a 1.3 cm mass in the right breast in the upper inner quadrant approximately 6 cm    from the nipple with spiculation. 11/10/2015 ultrasound-guided core needle     biopsy of the right breast revealed invasive ductal carcinoma with ER 70%, WI     less than 1%, and HER-2/dejah equivocal at 2+ with a subsequent positive FISH for     HER2. 2015 right sided lumpectomy and sentinel lymph node biopsy. This     revealed a 1.3 cm invasive ductal carcinoma, grade 2. The tumor was unifocal and    associated DCIS was present. All margins were negative. 4 sentinel lymph nodes     were removed and fortunately zero out of four were involved with any metastatic     deposit. This was staged as a pT1c pN0(sn) invasive ductal carcinoma.            HPI - Oncology Interim      Patient returns for ongoing follow-up of her right breast cancer.  She is on     anastrozole 1 mg daily started .  She is taking her medication daily as     prescribed.  She is not missing doses.  She denies any problems or side effects.     She is also on Prolia for bone health since 3/18.  She tolerates the injections    well.  She denies any dental or jaw  pain.  She sees her dentist on a regular     basis.      She denies any masses lymphadenopathy.  No unusual aches or pains.  She is     trying exercise.  She has no complaints.            Cancer Details            Right breast invasive ductal carcinoma ER/MN+  HER2+            Clinical Staging      Stage IA (T8dU2G7)            Treatments      Chemotherapy      12/30/15 thru 16 completed 6C TCHP--completed 1yr Herceptin      Arimidex     began. 6/25/16       3/14/18 Prolia initiated      Radiation Therapy      5/10/16 thru 16 completed 5700cGy right breast      Surgeries            2015 right sided lumpectomy and sentinel lymph node biopsy.            Clinical Trial Participant      No            ECOG Performance Status      0            Most Recent Lab Findings      Laboratory Tests      3/13/20 13:53            Laboratory Tests            Test       3/11/20      09:01             Magnesium Level       1.60 mg/dL      (1.60-2.30)            Most Recent Imaging Findings      Patient: SHANA FISHER   Acct: #[id number]   Report: #[id number]            UNIT #: [id number]    DOS: 03/15/19 0906      INSURANCE:[employer]   ORDER #:[facility] [id number]      LOCATION:[facility]     : 1965            PROVIDERS      ADMITTING:     ATTENDING: JENNIFER MURPHY      FAMILY:  SUHA GREER   ORDERING:  JENNIFER UMRPHY         OTHER:    DICTATING:  SUE RODRGIUEZ MD            REQ #:[id number]   EXAM:DIGSCRCAD - DIGITAL SCREENING w CAD      REASON FOR EXAM:        REASON FOR VISIT:  SCREENING            *******Signed******         PROCEDURE:   DIGITAL SCREENING W/CAD             COMPARISON:   [facility], MG, DIG SCREENING BILAT PROMISE W 3D    [place], 2017,       9:29.  [facility], MR, BREAST BILATERAL W/WO CONTRAST, 2016,     10:31.             VIEWS:  BILATERAL CC AND MLO VIEWS WERE OBTAINED UTILIZING [employer] CAD SOFTWARE.             INDICATIONS:   SCREENING              FINDINGS:         Scattered fibroglandular density.  No dominant mass or suspicious     microcalcification.  Stable       lumpectomy change in the right breast.             CONCLUSION:             Benign mammogram. Suggest routine mammographic screening.             RECOMMENDATION(S):          ROUTINE MAMMOGRAM AND CLINICAL EVALUATION IN 12 MONTHS.               BIRADS:          DIAGNOSTIC CATEGORY 2--BENIGN FINDING NO CHANGE FROM COMPARISON ASSESSMENT.               BREAST COMPOSITION:   There are scattered areas of fibroglandular density.             PLEASE NOTE:  A NORMAL MAMMOGRAM DOES NOT EXCLUDE THE POSSIBILITY OF BREAST     CANCER.       ANY CLINICALLY SUSPICIOUS PALPABLE LUMP SHOULD BE BIOPSIED.                SUE RODRIGUEZ MD             Electronically Signed and Approved By: SUE RODRIGUEZ MD on 3/19/2019 at 8:48                        Until signed, this is an unconfirmed preliminary report that may contain      errors and is subject to change.                                              HENRRYER:      D:03/19/19 0848            PAST, FAMILY   Past Medical History      Past Medical History:  Diabetes Type 1, High Cholesterol, Hypertension      Hematology/Oncology (F):  Breast Cancer            Past Surgical History      Biopsy (RIGHT), Lumpectomy (RIGHT), VAD Placement (AND REMOVED)            Family History      Family History:  Breast Cancer (MOTHER)            Social History      Marital Status:        Lives independently:  Yes      Number of Children:  2      Occupation:  [employer]            Tobacco Use      Tobacco status:  Never smoker            Alcohol Use      Alcohol intake:  None            Substance Use      Substance use:  Denies use            REVIEW OF SYSTEMS      General:  Admits: Fatigue      Eye:  Denies Blurred Vision      ENT:  Denies Headache      Cardiovascular:  Denies Chest Pain, Denies Palpitations      Respiratory:  Denies: Productive Cough, Shortness of Air       Gastrointestinal:  Denies Diarrhea, Denies Nausea/Vomiting      Genitourinary:  Denies Incontinence      Musculoskeletal:  Denies Muscle Pain      Integumentary:  Denies Lesions      Neurologic:  Denies Numbness\Tingling      Hematologic/Lymphatic:  Denies Bruising, Denies Bleeding            VITAL SIGNS AND SCORES      Vitals      Height 5 ft  / 152.4 cm      Weight 170 lbs 13.704 oz / 77.5 kg      BSA 1.75 m2      BMI 33.4 kg/m2      Temperature 98.0 F / 36.67 C - Temporal      Pulse 109      Blood Pressure 106/70 Sitting, Left Arm      Pulse Oximetry 97%, ROOM AIR            Pain Score      Experiencing any pain?:  No      Pain Scale Used:  Numerical      Pain Intensity:  0            Fatigue Score      Experiencing any fatigue?:  Yes      Fatigue (0-10 scale):  7            EXAM      General Appearance:  Positive for: Alert, Cooperative;          Negative for: Acute Distress      HEENT:  Positive for: Oropharynx clear      Neck:  Positive for: Supple;          Negative for: JVD, Masses      Respiratory:  Positive for: CTAB, Normal Respiratory Effort      Breast - Left:  Positive for: Appearance (Normal-appearing female breast);          Negative for: Adenopathy      Breast - Right:  Positive for: Appearance (Well-healed surgical incision on the     breast and axilla);          Negative for: Adenopathy      Abdomen/Gastro:  Positive for: Normal Active Bowel Sounds, Soft;          Negative for: Distention, Hepatosplenomegaly, Tenderness      Cardiovascular:  Positive for: RRR;          Negative for: Gallop, Murmur, Peripheral Edema, Rub      Psychiatric:  Positive for: Appropriate Affect, Intact Judgement      Lymphatic:  Negative for: Axillary, Cervical, Infraclavicular, Supraclavicular            PREVENTION      Hx Influenza Vaccination:  Yes      Date Influenza Vaccine Given:  Oct 1, 2019      Influenza Vaccine Declined:  No      2 or More Falls Past Year?:  No      Fall Past Year with Injury?:  No      Hx  Pneumococcal Vaccination:  No      Encouraged to follow-up with:  PCP regarding preventative exams.      Chart initiated by      MICHELLE GOULD [facility]            ALLERGY/MEDS      Allergies      Coded Allergies:             LISINOPRIL (Verified  Adverse Reaction, Unknown, cough, 3/16/20)            Medications      Last Reconciled on 3/16/20 12:40 by JENNIFER MURPHY      (Ivoka)   No Conflict Check      QDAY         Reported         3/16/20       SITagliptin (Januvia) 100 Mg Tablet      100 MG PO QDAY, #30 TAB 0 Refills         Reported         9/12/19       Venlafaxine XR (Effexor XR) 150 Mg Cap.er.24h      150 MG PO QDAY for 30 Days, #30 CAP.SR         Reported         9/12/19       Levothyroxine (Levothyroxine) 0.112 Mg Tablet      0.112 MG PO QDAY, #30 TAB 0 Refills         Reported         8/27/18       Anastrozole (Anastrozole) 1 Mg Tablet      1 MG PO QDAY, #30 TAB         Reported         1/31/17       Rosuvastatin Calcium (Crestor*) 40 Mg Tablet      40 MG PO HS, #30 TAB 0 Refills         Reported         12/24/15       Telmisartan (Micardis) 40 Mg Tab      40 MG PO QDAY, TAB         Reported         12/24/15      Medications Reviewed:  Changes made to meds            IMPRESSION/PLAN      Diagnosis      HER2-positive carcinoma of right breast - C50.911      Patient is on adjuvant hormone therapy with anastrozole 1 mg daily started 6/16.      Tolerating well.  I see no evidence of disease recurrence per history of     physical examination.  She is due for mammogram later this week and I encouraged     her to keep that appointment.  I will let her know the results.  RTC 6 months     for ongoing treatment with labs prior.  Refill provided today.            Osteopenia         Osteopenia, unspecified location - M85.80         Osteopenia location: unspecified      Noted on bone densitometry.  Patient is on Prolia every 6 months.  She takes     calcium plus vitamin D daily.  She is exercising including weightbearing.       Continue current therapy.  She is due for DEXA scan before next visit.      New Diagnostics      * Bone Densitometry DEXA, 6 Months            Notes      New Medications      * (IVOKANA): QDAY      Renewed Medications      * Anastrozole 1 MG TABLET:         From: 1 MG PO QDAY #30         To: 1 MG PO QDAY 30 Days #30            Patient Education            Aerobic Exercise      Breast Self-exam (BSE)      Patient Education Provided:  Yes            Electronically signed by JENNIFER MURPHY  03/16/2020 12:41       Disclaimer: Converted document may not contain table formatting or lab diagrams. Please see [employer] for the authenticated document.

## 2021-05-28 NOTE — PROGRESS NOTES
Patient: SHNAA FISHER     Acct: [id number]     Report: #[id number]  UNIT #: [id number]     : 1965    Encounter Date:2019  PRIMARY CARE: ANASTASIA THAPA DO  ***Signed***  --------------------------------------------------------------------------------------------------------------------  NURSE INTAKE      Visit Type      Established Patient Visit            Chief Complaint      PROLIA SHOT AND LAB RESULTS      Intent of Therapy:  Palliative            Referring Provider/Copies To      Referring Provider:  ESTUARDO NEWMAN      Primary Care Provider:  SUHA GREER            History and Present Illness      Past Oncology Illness History      Mrs. Shana Fisher is a very pleasant 52-year-old  female with recent     abnormal mammogram. Her mammogram was performed at the [facility] on     2015 which showed a 1.3 cm mass in the right breast in the upper inner     quadrant approximately 6 cm from the nipple with spiculation. She then underwent    an ultrasound-guided core needle biopsy of the right breast on 11/10/2015.     Immunohistochemical staining revealed an invasive ductal carcinoma with ER 70%,     HI less than 1%, and HER-2/dejah equivocal at 2+ with a subsequent positive FISH     for HER2.            She then underwent a right sided lumpectomy and sentinel lymph node biopsy on     2015. This revealed a 1.3 cm invasive ductal carcinoma, grade 2. The tumor    was unifocal and associated DCIS was present. All margins were negative. 4     sentinel lymph nodes were removed and fortunately zero out of four were involved    with any metastatic deposit. This was staged as a pT1c pN0(sn) invasive ductal     carcinoma.            She was then referred to my clinic for additional discussion of adjuvant padilla    tment options for her HER-2 positive breast cancer. She was previously discussed    at the [facility] tumor board on 2015. At her initial clinic      appointment on 12/15/2015 she had no complaints and was at her normal baseline     state of health.            ONCOLOGICAL AND TREATMENT HISTORY:            1. 11/02/15 - Mammogram ([facility]) 1.3 cm mass in right breast upp    er inner quadrant            2. 11/10/15 - Ultrasound guided biopsy - IDC ER (70%), MD (<1%) and Her-2 dejah     equivocal at 2+ with subsequent FISH positive            3. 11/23/16 - Right sided lumpectomy - 1.3 cm IDC grade 2, tumor was unifocal     and associated DCIS was present. All margins were negative. 4 sentinel lymph n    odes were removed and fortunately zero out of four were involved with any     metastatic deposit. This was staged as a pT1c pN0(sn) invasive ductal carcinoma     (Stage 1A)            4. 12/30/15 - TCH+P initiated.  Completed cycle 6 on 04/20/16, will continue     Herceptin x 1 year.            5. 05/10/16 - Radiation initiated, completed 06/24/16.             6. 06/25/16 - Arimidex initiated for 5 years.            7. 07/13/16 - Mammogram done for right breast lump in scar line.            8. 07/19/16 - MRI of breasts - benign bilateral breast MRI, postop changes,     suggest surgical cnosult biopsy of suspicious mass, deemed begn radiographically    according to note on 8/3/16.            9. 02/01/17 - Colonoscopy - benign            10. 04/14/17 - Bone densitometry - osteopenia of lumbar spine and hip            11.11/21/17 - Mammogram - benign, scattered areas of fibroglandular density.             12.07/20/18 - Bone densitometry - osteopenia of lumbar spine and worsening     osteopenia in hip compared to April 2017.            HPI - Oncology Interim      F/u right breast ca--tolerating Letrozole w/o issues--does have hot flashes     pretty regularly.  Due for Prolia today.  DEXA good thru 7/2020.  Mammogram     3/2019.  Reports very occasional RUQ tinges of pain--not sure if anything corre    lates with the episodes.  Denies n/v at this time;  however, approx 1mo ago     experienced a wk of nausea w/o emesis which resolved spontaneously.   She denies    changes to her breasts; no nipple discharge or skin changes.  No distress noted.            Cancer Details            Right breast invasive ductal carcinoma ER/NV+  HER2+            Clinical Staging      Stage IA ([id number])            Treatments      Chemotherapy      12/30/15 thru 4/20/16 completed 6C TCHP--completed 1yr Herceptin      Arimidex     began. 6/25/16       3/14/18 Prolia initiated      Radiation Therapy      5/10/16 thru 6/24/16 completed 5700cGy right breast            Clinical Trial Participant      No            ECOG Performance Status      0            Most Recent Lab Findings      Laboratory Tests      9/5/19 10:22            Laboratory Tests            Test       9/5/19      10:22             Magnesium Level       1.73 mg/dL      (1.60-2.30)            PAST, FAMILY   Past Medical History      Past Medical History:  Diabetes Type 1, High Cholesterol, Hypertension      Hematology/Oncology (F):  Breast Cancer            Past Surgical History      Biopsy (RIGHT), Lumpectomy (RIGHT), VAD Placement (AND REMOVED)            Family History      Family History:  Breast Cancer (MOTHER)            Social History      Marital Status:        Lives independently:  Yes      Number of Children:  2      Occupation:  [employer]            Tobacco Use      Tobacco status:  Never smoker            Alcohol Use      Alcohol intake:  None            Substance Use      Substance use:  Denies use            REVIEW OF SYSTEMS      General:  Denies: Fatigue      Eye:  Admits Corrective Lenses      ENT:  Admits Headache      Respiratory:  Denies: Shortness of Air      Gastrointestinal:  Denies Diarrhea, Denies Nausea/Vomiting      Musculoskeletal:  Denies Muscle Pain      Neurologic:  Denies Dizziness, Denies Numbness\Tingling            VITAL SIGNS AND SCORES      Vitals      Weight 173 lbs 4.505 oz / 78.6  kg      Temperature 97.7 F / 36.5 C - Temporal      Pulse 111      Respirations 16      Blood Pressure 113/66 Sitting, Left Arm      Pulse Oximetry 97%, RM AIR            Pain Score      Pain Scale Used:  Numerical      Pain Intensity:  0            Fatigue Score      Fatigue (0-10 scale):  0 (none)            EXAM      General Appearance:  Positive for: Alert, Oriented x3, Cooperative;          Negative for: Acute Distress      Eye:  Positive for: Anicteric Sclerae, Moist Conjunctiva      Neck:  Positive for: Supple;          Negative for: JVD, Masses      Respiratory:  Positive for: CTAB, Normal Respiratory Effort      Breast - Left:  Positive for: Appearance (Normal-appearing female breast);          Negative for: Adenopathy      Breast - Right:  Positive for: Appearance (Well-healed surgical incision on the     breast and axilla);          Negative for: Adenopathy      Abdomen/Gastro:  Positive for: Normal Active Bowel Sounds, Soft;          Negative for: Distention, Hepatosplenomegaly, Tenderness      Cardiovascular:  Positive for: RRR;          Negative for: Gallop, Murmur, Peripheral Edema, Rub      Psychiatric:  Positive for: Appropriate Affect, Intact Judgement      Lymphatic:  Negative for: Axillary, Cervical, Infraclavicular, Supraclavicular            PREVENTION      Date Influenza Vaccine Given:  Dec 1, 2018      Influenza Vaccine Declined:  No      2 or More Falls Past Year?:  No      Fall Past Year with Injury?:  No      Encouraged to follow-up with:  PCP regarding preventative exams.      Chart initiated by      WANDA MASTERS MA            ALLERGY/MEDS      Allergies      Coded Allergies:             LISINOPRIL (Verified  Adverse Reaction, Unknown, cough, 9/12/19)            Medications      Last Reconciled on 9/12/19 11:43 by MARIBEL ARELLANO      sitaGLIPtin Phosphate (Januvia *) 100 Mg Tablet      100 MG PO QDAY, #30 TAB 0 Refills         Reported         9/12/19       Venlafaxine XR  (Effexor XR) 150 Mg Cap.er.24h      150 MG PO QDAY for 30 Days, #30 CAP.SR         Reported         9/12/19       Metformin Hcl* (Metformin Hcl*) 850 Mg Tablet      850 MG PO QDAY, #30 TAB 0 Refills         Reported         9/12/19       Levothyroxine (Levothyroxine) 0.112 Mg Tablet      0.112 MG PO QDAY, #30 TAB 0 Refills         Reported         8/27/18       Anastrozole (Anastrozole) 1 Mg Tablet      1 MG PO QDAY, #30 TAB         Reported         1/31/17       Rosuvastatin Calcium (Crestor*) 40 Mg Tablet      40 MG PO HS, #30 TAB 0 Refills         Reported         12/24/15       Telmisartan (Micardis) 40 Mg Tab      40 MG PO QDAY, TAB         Reported         12/24/15      Medications Reviewed:  No Changes made to meds            IMPRESSION/PLAN      Diagnosis      Breast screening - Z12.39            Invasive ductal carcinoma of right breast, stage 1 - C50.911            Osteopenia         Osteopenia, unspecified location - M85.80         Osteopenia location: unspecified            Notes      Patient is on adjuvant hormone therapy with Arimidex 1 mg daily.  Tolerating     well.  She is also due for Prolia for osteopenia.  She denies problems with     injections.  No dental or jaw pain.  Encouraged exercise including     weightbearing.  Continue Arimidex daily for a minimum of 5 years.  RTC 6 months     for OV with mammogram prior.  The episodes of very occasional right upper     quadrant pain and one instance of nausea may be related to gallstones or liver     issues.  I encouraged her to monitor and keep a diary of the issues and if they     persist follow-up with her primary care provider      New Medications      * Metformin Hcl* 850 MG TABLET: 850 MG PO QDAY #30      * Venlafaxine XR (Effexor XR) 150 MG CAP.ER.24H: 150 MG PO QDAY 30 Days #30      * sitaGLIPtin Phosphate (Januvia *) 100 MG TABLET: 100 MG PO QDAY #30      New Diagnostics      * Screening Mammo, 6 Months         Dx: Breast screening - Z12.39             Patient Education            Abdominal Muscle Strain      Gallstones      Patient Education Provided:  Yes            Topics Patient Counseled on      Vitamin E for hot flashes(>1000mg qd)                 Disclaimer: Converted document may not contain table formatting or lab diagrams. Please see [employer] for the authenticated document.

## 2021-05-28 NOTE — PROGRESS NOTES
Patient: FIONA PORTILLO     Acct: [id number]     Report: #[id number]  UNIT #: [id number]     : 1965    Encounter Date:2021  PRIMARY CARE: ANASTASIA THAPA DO  ***Signed***  --------------------------------------------------------------------------------------------------------------------  NURSE INTAKE      Visit Type      Established Patient Visit            Chief Complaint      (R) BREAST CA      Intent of Therapy:  Curative            Referring Provider/Copies To      Primary Care Provider:  ANASTASIA THAPA DO      Copies To:   ANASTASIA THAPA DO            History and Present Illness      Past Oncology Illness History      Mrs. Fiona Portillo is a very pleasant 55-year-old  female with a mammo    gram was performed at the [facility] on 2015 which showed a 1.3    cm mass in the right breast in the upper inner quadrant approximately 6 cm from     the nipple with spiculation. 11/10/2015 ultrasound-guided core needle biopsy of     the right breast revealed invasive ductal carcinoma with ER 70%, NH less than     1%, and HER-2/dejah equivocal at 2+ with a subsequent positive FISH for HER2.     2015 right sided lumpectomy and sentinel lymph node biopsy. This revealed     a 1.3 cm invasive ductal carcinoma, grade 2. The tumor was unifocal and     associated DCIS was present. All margins were negative. 4 sentinel lymph nodes     were removed and fortunately zero out of four were involved with any metastatic     deposit. This was staged as a pT1c pN0(sn) invasive ductal carcinoma.            HPI - Oncology Interim      Patient returns for follow-up of her right breast cancer.  She is status post     treatments as outlined.  She is on letrozole started .  She is compliant     with regimen.  She denies problems or side effects from the medication.  She     denies any masses or adenopathy.  No unusual aches or pains.  She notes good     appetite and her weight is maintained.  She  has adequate energy level.  She has     tried exercise.  She receives Prolia injections twice a year for bone health.      She denies problems from the injections.  No dental or jaw pain.            Cancer Details            Right breast invasive ductal carcinoma ER/AR+  HER2+            Clinical Staging      Stage IA ([id number])            Treatments      Chemotherapy      12/30/15 thru 4/20/16 completed 6C TCHP--completed 1yr Herceptin      Arimidex     began. 6/25/16       3/14/18 Prolia initiated      Radiation Therapy      5/10/16 thru 6/24/16 completed 5700cGy right breast      Surgeries      11/23/2015 right sided lumpectomy and sentinel lymph node biopsy.            Clinical Trial Participant      No            ECOG Performance Status      0            PAST, FAMILY   Past Medical History      Past Medical History:  Diabetes Type 1, High Cholesterol, Hypertension      Hematology/Oncology (F):  Breast Cancer            Past Surgical History      Biopsy, Lumpectomy, VAD Placement            Family History      Family History:  Breast Cancer            Social History      Marital Status:        Lives independently:  Yes      Number of Children:  2      Occupation:  [employer]            Tobacco Use      Tobacco status:  Never smoker            Substance Use      Substance use:  Denies use            REVIEW OF SYSTEMS      General:  Denies: Fatigue      Eye:  Denies Blurred Vision      ENT:  Denies Headache      Cardiovascular:  Denies Chest Pain      Respiratory:  Denies: Cough      Gastrointestinal:  Denies Bloody Stools      Genitourinary:  Denies Blood in Urine      Musculoskeletal:  Denies Back Pain      Integumentary:  Denies Itching      Neurologic:  Denies Dizziness            VITAL SIGNS AND SCORES      Vitals      Weight 177 lbs 0.470 oz / 80.3 kg      Temperature 97.3 F / 36.28 C - Temporal      Pulse 98      Blood Pressure 110/71 Sitting, Left Arm      Pulse Oximetry 98%, RM AIR            Pain  Score      Pain Intensity:  0            Fatigue Score      Fatigue (0-10 scale):  0 (none)            EXAM      General Appearance:  Positive for: Alert, Cooperative;          Negative for: Acute Distress      Eye:  Positive for: Anicteric Sclerae, Moist Conjunctiva      Neck:  Positive for: Supple;          Negative for: JVD, Masses      Respiratory:  Positive for: CTAB, Normal Respiratory Effort      Breast - Left:  Positive for: Appearance (Normal-appearing female breast);          Negative for: Adenopathy      Breast - Right:  Positive for: Appearance (Well-healed surgical incision on the     breast and axilla);          Negative for: Adenopathy      Abdomen/Gastro:  Positive for: Normal Active Bowel Sounds, Soft;          Negative for: Distention, Hepatosplenomegaly, Tenderness      Cardiovascular:  Positive for: RRR;          Negative for: Gallop, Murmur, Peripheral Edema, Rub      Psychiatric:  Positive for: Appropriate Affect, Intact Judgement      Lymphatic:  Negative for: Axillary, Cervical, Infraclavicular, Supraclavicular            PREVENTION      Hx Influenza Vaccination:  Yes      Date Influenza Vaccine Given:  Oct 1, 2020      Influenza Vaccine Declined:  No      2 or More Falls in Past Year?:  No      Fall Past Year with Injury?:  No      Hx Pneumococcal Vaccination:  No      Encouraged to follow-up with:  PCP regarding preventative exams.      Chart initiated by      MARTELL FARRAR CMA            ALLERGY/MEDS      Allergies      Coded Allergies:             LISINOPRIL (Verified  Adverse Reaction, Unknown, cough, 3/25/21)            Medications      Last Reconciled on 3/25/21 16:32 by JENNIFER MURPHY      Anastrozole (Anastrozole) 1 Mg Tablet      1 MG PO QDAY for 30 Days, #30 TAB 5 Refills         Prov: JENNIFER MURPHY         3/16/20       ([employer])   No Conflict Check      QDAY         Reported         3/16/20       SITagliptin (Januvia) 100 Mg Tablet      100 MG PO QDAY, #30 TAB 0 Refills          Reported         9/12/19       Venlafaxine XR (Effexor XR) 150 Mg Cap.er.24h      150 MG PO QDAY for 30 Days, #30 CAP.SR         Reported         9/12/19       Levothyroxine (Levothyroxine) 0.112 Mg Tablet      0.112 MG PO QDAY, #30 TAB 0 Refills         Reported         8/27/18       Rosuvastatin Calcium (Crestor*) 40 Mg Tablet      40 MG PO HS, #30 TAB 0 Refills         Reported         12/24/15       Telmisartan (Micardis) 40 Mg Tab      40 MG PO QDAY, TAB         Reported         12/24/15      Medications Reviewed:  No Changes made to meds            IMPRESSION/PLAN      Diagnosis      Invasive ductal carcinoma of right breast, stage 1 - C50.911      Status post treatment as outlined.  Patient is on adjuvant letrozole started     6/16.  Tolerating well.  I will see her back in June which will complete her 5     years of therapy.  She will need mammogram prior to that visit.  If her     examination and mammogram are good, she can stop the medication            Osteopenia         Osteopenia, unspecified location         Osteopenia location: unspecified      Patient is on Prolia every 6 months.  Tolerating well.  No dental or jaw pain.      Recent calcium is normal.  Continue Caltrate D twice daily.  Continue exercise,     including weightbearing.            Notes      New Diagnostics      * Screening Mammo, 3 Months         Dx: Invasive ductal carcinoma of right breast, stage 1 - C50.911            Advanced Care Plan Discussion      Declines Discussion [id number]            Patient Education      Patient Education Provided:  Yes            Electronically signed by JENNIFER MURPHY  03/25/2021 16:33       Disclaimer: Converted document may not contain table formatting or lab diagrams. Please see [employer] for the authenticated document.

## 2021-07-01 ENCOUNTER — TELEPHONE (OUTPATIENT)
Dept: ONCOLOGY | Facility: HOSPITAL | Age: 56
End: 2021-07-01

## 2021-07-01 ENCOUNTER — HOSPITAL ENCOUNTER (OUTPATIENT)
Dept: MAMMOGRAPHY | Facility: HOSPITAL | Age: 56
Discharge: HOME | End: 2021-07-01
Admitting: INTERNAL MEDICINE

## 2021-07-01 DIAGNOSIS — C50.919 MALIGNANT NEOPLASM OF FEMALE BREAST, UNSPECIFIED ESTROGEN RECEPTOR STATUS, UNSPECIFIED LATERALITY, UNSPECIFIED SITE OF BREAST (HCC): Primary | ICD-10-CM

## 2021-07-01 DIAGNOSIS — Z12.31 BREAST CANCER SCREENING BY MAMMOGRAM: ICD-10-CM

## 2021-07-01 PROBLEM — E03.9 HYPOTHYROID: Status: ACTIVE | Noted: 2021-07-01

## 2021-07-01 PROBLEM — F32.A DEPRESSION: Status: ACTIVE | Noted: 2021-07-01

## 2021-07-01 PROBLEM — N18.30 STAGE 3 CHRONIC KIDNEY DISEASE (HCC): Status: ACTIVE | Noted: 2018-02-19

## 2021-07-01 PROBLEM — K76.0 FATTY LIVER: Status: ACTIVE | Noted: 2018-07-26

## 2021-07-01 PROBLEM — E11.9 DIABETES (HCC): Status: ACTIVE | Noted: 2021-07-01

## 2021-07-01 PROBLEM — E11.9 TYPE 2 DIABETES MELLITUS WITHOUT COMPLICATION: Status: ACTIVE | Noted: 2018-02-19

## 2021-07-01 PROBLEM — I10 ESSENTIAL (PRIMARY) HYPERTENSION: Status: ACTIVE | Noted: 2018-02-19

## 2021-07-01 PROBLEM — J30.9 ALLERGIC RHINITIS: Status: ACTIVE | Noted: 2021-07-01

## 2021-07-01 PROBLEM — I10 HIGH BLOOD PRESSURE: Status: ACTIVE | Noted: 2021-07-01

## 2021-07-01 PROBLEM — E78.5 HYPERLIPIDEMIA: Status: ACTIVE | Noted: 2021-07-01

## 2021-07-01 PROBLEM — E78.00 HIGH CHOLESTEROL: Status: ACTIVE | Noted: 2021-07-01

## 2021-07-01 PROBLEM — E05.90 HYPERTHYROIDISM: Status: ACTIVE | Noted: 2021-07-01

## 2021-07-01 PROCEDURE — 77067 SCR MAMMO BI INCL CAD: CPT

## 2021-07-01 RX ORDER — ERGOCALCIFEROL 1.25 MG/1
CAPSULE ORAL
COMMUNITY
End: 2021-09-23 | Stop reason: SDUPTHER

## 2021-07-01 RX ORDER — GLIPIZIDE 5 MG/1
10 TABLET ORAL DAILY
COMMUNITY
Start: 2021-03-05 | End: 2021-07-13 | Stop reason: SDUPTHER

## 2021-07-01 RX ORDER — TELMISARTAN 40 MG/1
TABLET ORAL
COMMUNITY
Start: 2012-05-23 | End: 2021-10-05

## 2021-07-01 RX ORDER — VENLAFAXINE HYDROCHLORIDE 75 MG/1
CAPSULE, EXTENDED RELEASE ORAL
COMMUNITY
Start: 2010-05-23 | End: 2021-08-09

## 2021-07-01 RX ORDER — FEXOFENADINE HCL 180 MG/1
TABLET ORAL
COMMUNITY
End: 2021-10-18 | Stop reason: SDUPTHER

## 2021-07-06 ENCOUNTER — OFFICE VISIT (OUTPATIENT)
Dept: ONCOLOGY | Facility: HOSPITAL | Age: 56
End: 2021-07-06

## 2021-07-06 VITALS
SYSTOLIC BLOOD PRESSURE: 124 MMHG | OXYGEN SATURATION: 98 % | DIASTOLIC BLOOD PRESSURE: 74 MMHG | HEART RATE: 97 BPM | TEMPERATURE: 97 F | BODY MASS INDEX: 32.99 KG/M2 | RESPIRATION RATE: 16 BRPM | WEIGHT: 174.6 LBS

## 2021-07-06 DIAGNOSIS — Z17.0 MALIGNANT NEOPLASM OF UPPER-OUTER QUADRANT OF RIGHT BREAST IN FEMALE, ESTROGEN RECEPTOR POSITIVE (HCC): Primary | ICD-10-CM

## 2021-07-06 DIAGNOSIS — M85.88 OSTEOPENIA OF LUMBAR SPINE: ICD-10-CM

## 2021-07-06 DIAGNOSIS — C50.411 MALIGNANT NEOPLASM OF UPPER-OUTER QUADRANT OF RIGHT BREAST IN FEMALE, ESTROGEN RECEPTOR POSITIVE (HCC): Primary | ICD-10-CM

## 2021-07-06 PROCEDURE — G0463 HOSPITAL OUTPT CLINIC VISIT: HCPCS | Performed by: INTERNAL MEDICINE

## 2021-07-06 PROCEDURE — 99214 OFFICE O/P EST MOD 30 MIN: CPT | Performed by: INTERNAL MEDICINE

## 2021-07-06 RX ORDER — CANAGLIFLOZIN 100 MG/1
TABLET, FILM COATED ORAL
COMMUNITY
Start: 2021-05-19 | End: 2021-10-18 | Stop reason: SDUPTHER

## 2021-07-06 RX ORDER — ANASTROZOLE 1 MG/1
TABLET ORAL
COMMUNITY
Start: 2021-04-18 | End: 2021-09-23 | Stop reason: SDUPTHER

## 2021-07-06 RX ORDER — ROSUVASTATIN CALCIUM 40 MG/1
TABLET, COATED ORAL
COMMUNITY
Start: 2021-05-19 | End: 2021-10-05

## 2021-07-06 RX ORDER — LEVOTHYROXINE SODIUM 125 UG/1
TABLET ORAL
COMMUNITY
Start: 2021-05-06 | End: 2021-10-18 | Stop reason: SDUPTHER

## 2021-07-06 NOTE — ASSESSMENT & PLAN NOTE
Status post treatments as outlined including HER-2 based therapy with maintenance Herceptin.  She has now been on anastrozole for slightly over 5 years.  Recent mammogram reviewed with patient.  I see no evidence of disease recurrence by mammogram, history of physical examination.  She can stop the anastrozole after her current bottle.  I will see her back in September with her Prolia injection and then we will plan to see her yearly with her mammogram

## 2021-07-06 NOTE — PROGRESS NOTES
Chief Complaint  Breast Cancer and Follow-up    Ny, Jesica, DO  Ny, Marcellol, DO    Subjective          Fiona Portillo presents to [facility] for follow-up breast cancer treatment of her osteopenia.  She is on anastrozole started in 2016.  She has now completed 5 years of therapy.  She is compliant with regimen.  Denies problems or side effects.  No new masses or adenopathy.  No unusual aches or pains.  She exercises routinely.  She denies dental or jaw pain from her Prolia.  She does take calcium and vitamin D.      Oncology/Hematology History Overview Note   Right breast invasive ductal carcinoma ER/MA+  HER2+            Clinical Staging      Stage IA ([id number])            Treatments      Chemotherapy      12/30/15 thru 4/20/16 completed 6C TCHP--completed 1yr Herceptin      Arimidex   began. 6/25/16         3/14/18 Prolia initiated        Radiation Therapy      5/10/16 thru 6/24/16 completed 5700cGy right breast        Surgeries      11/23/2015 right sided lumpectomy and sentinel lymph node biopsy.         Malignant neoplasm of upper-outer quadrant of right breast in female, estrogen receptor positive (CMS/HCC)   7/1/2021 Initial Diagnosis    Breast CA (CMS/HCC)     7/6/2021 Cancer Staged    Staging form: Breast, AJCC 8th Edition  - Clinical: cT1c, cN0, cM0, ER+, MA+, HER2+ - Signed by Champ Estrada MD on 7/6/2021         Review of Systems   Constitutional: Negative for appetite change, diaphoresis, fatigue, fever, unexpected weight gain and unexpected weight loss.   HENT: Negative for hearing loss, mouth sores, sore throat, swollen glands, trouble swallowing and voice change.    Eyes: Negative for blurred vision.   Respiratory: Negative for cough, shortness of breath and wheezing.    Cardiovascular: Negative for chest pain and palpitations.   Gastrointestinal: Negative for abdominal pain, blood in stool, constipation, diarrhea, nausea and vomiting.   Endocrine:  Negative for cold intolerance and heat intolerance.   Genitourinary: Negative for difficulty urinating, dysuria, frequency, hematuria and urinary incontinence.   Musculoskeletal: Negative for arthralgias, back pain and myalgias.   Skin: Negative for rash, skin lesions and bruise.   Neurological: Negative for dizziness, seizures, weakness, numbness and headache.   Hematological: Does not bruise/bleed easily.   Psychiatric/Behavioral: Negative for depressed mood. The patient is not nervous/anxious.      Current Outpatient Medications on File Prior to Visit   Medication Sig Dispense Refill   • glipizide (GLUCOTROL) 5 MG tablet glipizide 5 mg oral tablet take 1 tablet (5 mg) by oral route once daily before a meal for 90 days 3/5/2021  Active     • telmisartan (Micardis) 40 MG tablet      • venlafaxine XR (Effexor XR) 75 MG 24 hr capsule      • anastrozole (ARIMIDEX) 1 MG tablet      • ergocalciferol (ERGOCALCIFEROL) 1.25 MG (31325 UT) capsule Vitamin D2 50,000 unit oral capsule take 1 capsule (50,000 unit) by oral route once weekly   Active     • fexofenadine (ALLEGRA) 180 MG tablet fexofenadine 180 mg oral tablet take 1 tablet (180 mg) by oral route once daily   Active     • Invokana 100 MG tablet tablet      • Levoxyl 125 MCG tablet      • rosuvastatin (CRESTOR) 40 MG tablet      • SITagliptin (JANUVIA) 100 MG tablet Take 100 mg by mouth.       No current facility-administered medications on file prior to visit.       Allergies   Allergen Reactions   • Lisinopril Unknown - Low Severity     Past Medical History:   Diagnosis Date   • Breast cancer (CMS/HCC)     Invasive ductal, right  2016   • Diabetes mellitus type 1 (CMS/HCC)    • Drug therapy    • High cholesterol    • Hx of radiation therapy    • Hypertension    • Malignant neoplasm of upper-outer quadrant of right breast in female, estrogen receptor positive (CMS/HCC) 7/1/2021   • Osteopenia of lumbar spine 7/6/2021     Past Surgical History:   Procedure Laterality  Date   • BREAST LUMPECTOMY      right   • LEFT VENTRICULAR ASSIST DEVICE     • OTHER SURGICAL HISTORY      BIOPSY     Social History     Socioeconomic History   • Marital status:      Spouse name: Not on file   • Number of children: 2   • Years of education: Not on file   • Highest education level: Not on file   Tobacco Use   • Smoking status: Never Smoker   Substance and Sexual Activity   • Drug use: Never     Family History   Problem Relation Age of Onset   • Breast cancer Other    • Breast cancer Mother        Objective   Physical Exam  Vitals reviewed. Exam conducted with a chaperone present.   Constitutional:       Appearance: Normal appearance.   HENT:      Head: Normocephalic and atraumatic.   Eyes:      Conjunctiva/sclera: Conjunctivae normal.      Pupils: Pupils are equal, round, and reactive to light.   Cardiovascular:      Rate and Rhythm: Normal rate and regular rhythm.      Heart sounds: Normal heart sounds. No murmur heard.   No gallop.    Pulmonary:      Effort: Pulmonary effort is normal.      Breath sounds: Normal breath sounds.   Chest:      Breasts:         Right: No swelling, bleeding, inverted nipple, mass, nipple discharge, skin change or tenderness.         Left: Normal. No swelling, bleeding, inverted nipple, mass, nipple discharge, skin change or tenderness.       Abdominal:      General: Abdomen is flat. Bowel sounds are normal. There is no distension.      Palpations: Abdomen is soft.      Tenderness: There is no abdominal tenderness.      Comments: No HSM   Musculoskeletal:      Cervical back: Neck supple. No tenderness.      Right lower leg: No edema.      Left lower leg: No edema.   Lymphadenopathy:      Upper Body:      Right upper body: No supraclavicular or axillary adenopathy.      Left upper body: No supraclavicular or axillary adenopathy.   Neurological:      Mental Status: She is alert and oriented to person, place, and time.   Psychiatric:         Mood and Affect: Mood  normal.         Behavior: Behavior normal.         Vitals:    07/06/21 0808   BP: 124/74   Pulse: 97   Resp: 16   Temp: 97 °F (36.1 °C)   SpO2: 98%   Weight: 79.2 kg (174 lb 9.7 oz)   PainSc: 0-No pain     ECOG score: 0         PHQ-9 Total Score: 0                  Result Review :   The following data was reviewed by: Champ Estrada MD on 07/06/2021:  Lab Results   Component Value Date    HGB 13.8 03/25/2021    HCT 41.7 03/25/2021    MCV 84.1 03/25/2021     01/28/2021    WBC 6.54 03/25/2021    NEUTROABS 3.88 03/25/2021    LYMPHSABS 1.79 03/25/2021    MONOSABS 0.68 03/25/2021    EOSABS 0.14 03/25/2021    BASOSABS 0.02 03/25/2021     Lab Results   Component Value Date    BUN 29 (H) 03/25/2021    CREATININE 1.27 (H) 03/25/2021     03/25/2021    K 4.0 03/25/2021     03/25/2021    CO2 26 03/25/2021    CALCIUM 10.2 03/25/2021    PROTEINTOT 7.0 03/25/2021    ALBUMIN 4.5 03/25/2021    BILITOT 0.39 03/25/2021    ALKPHOS 70 03/25/2021    AST 22 03/25/2021    ALT 31 03/25/2021     Data reviewed: Radiologic studies Mammogram from 7/1/2021 reviewed with patient.  Images reviewed.      Assessment and Plan    Diagnoses and all orders for this visit:    1. Malignant neoplasm of upper-outer quadrant of right breast in female, estrogen receptor positive (CMS/HCC) (Primary)  Assessment & Plan:  Status post treatments as outlined including HER-2 based therapy with maintenance Herceptin.  She has now been on anastrozole for slightly over 5 years.  Recent mammogram reviewed with patient.  I see no evidence of disease recurrence by mammogram, history of physical examination.  She can stop the anastrozole after her current bottle.  I will see her back in September with her Prolia injection and then we will plan to see her yearly with her mammogram      2. Osteopenia of lumbar spine  Assessment & Plan:  Patient is on Prolia every 6 months.  Tolerating the injections well.  No dental or jaw pain.  She does take calcium  and vitamin D.  She is exercising routinely.  Next injection will be due in September.            Patient Follow Up: September with Prolia      Patient was given instructions and counseling regarding her condition or for health maintenance advice. Please see specific information pulled into the AVS if appropriate.     Champ Estrada MD    7/6/2021

## 2021-07-06 NOTE — ASSESSMENT & PLAN NOTE
Patient is on Prolia every 6 months.  Tolerating the injections well.  No dental or jaw pain.  She does take calcium and vitamin D.  She is exercising routinely.  Next injection will be due in September.

## 2021-07-11 ENCOUNTER — APPOINTMENT (OUTPATIENT)
Dept: GENERAL RADIOLOGY | Facility: HOSPITAL | Age: 56
End: 2021-07-11

## 2021-07-11 ENCOUNTER — HOSPITAL ENCOUNTER (EMERGENCY)
Facility: HOSPITAL | Age: 56
Discharge: HOME | End: 2021-07-11
Attending: EMERGENCY MEDICINE | Admitting: EMERGENCY MEDICINE

## 2021-07-11 VITALS
HEIGHT: 60 IN | OXYGEN SATURATION: 98 % | SYSTOLIC BLOOD PRESSURE: 110 MMHG | BODY MASS INDEX: 34.45 KG/M2 | WEIGHT: 175.49 LBS | HEART RATE: 92 BPM | DIASTOLIC BLOOD PRESSURE: 71 MMHG | TEMPERATURE: 98.3 F | RESPIRATION RATE: 17 BRPM

## 2021-07-11 DIAGNOSIS — E11.65 TYPE 2 DIABETES MELLITUS WITH HYPERGLYCEMIA, WITHOUT LONG-TERM CURRENT USE OF INSULIN (HCC): Primary | ICD-10-CM

## 2021-07-11 LAB
ACETONE BLD QL: ABNORMAL
ALBUMIN SERPL-MCNC: 4.7 G/DL (ref 3.5–5.2)
ALBUMIN/GLOB SERPL: 1.7 G/DL
ALP SERPL-CCNC: 76 U/L (ref 39–117)
ALT SERPL W P-5'-P-CCNC: 22 U/L (ref 1–33)
ANION GAP SERPL CALCULATED.3IONS-SCNC: 15.2 MMOL/L (ref 5–15)
AST SERPL-CCNC: 18 U/L (ref 1–32)
BACTERIA UR QL AUTO: ABNORMAL /HPF
BASE EXCESS BLDV CALC-SCNC: -4 MMOL/L (ref -2–2)
BASOPHILS # BLD AUTO: 0.02 10*3/MM3 (ref 0–0.2)
BASOPHILS NFR BLD AUTO: 0.2 % (ref 0–1.5)
BDY SITE: ABNORMAL
BILIRUB SERPL-MCNC: 0.5 MG/DL (ref 0–1.2)
BILIRUB UR QL STRIP: NEGATIVE
BUN SERPL-MCNC: 30 MG/DL (ref 6–20)
BUN/CREAT SERPL: 20.3 (ref 7–25)
CALCIUM SPEC-SCNC: 11.1 MG/DL (ref 8.6–10.5)
CHLORIDE SERPL-SCNC: 94 MMOL/L (ref 98–107)
CLARITY UR: CLEAR
CO2 SERPL-SCNC: 20.8 MMOL/L (ref 22–29)
COHGB MFR BLD: 1.7 % (ref 0–1.5)
COLOR UR: YELLOW
CREAT SERPL-MCNC: 1.48 MG/DL (ref 0.57–1)
DEPRECATED RDW RBC AUTO: 38.9 FL (ref 37–54)
EOSINOPHIL # BLD AUTO: 0.12 10*3/MM3 (ref 0–0.4)
EOSINOPHIL NFR BLD AUTO: 1.3 % (ref 0.3–6.2)
ERYTHROCYTE [DISTWIDTH] IN BLOOD BY AUTOMATED COUNT: 13.3 % (ref 12.3–15.4)
FHHB: 12.7 % (ref 0–5)
GFR SERPL CREATININE-BSD FRML MDRD: 37 ML/MIN/1.73
GLOBULIN UR ELPH-MCNC: 2.8 GM/DL
GLUCOSE BLDC GLUCOMTR-MCNC: 219 MG/DL (ref 70–130)
GLUCOSE BLDC GLUCOMTR-MCNC: 269 MG/DL (ref 70–130)
GLUCOSE BLDC GLUCOMTR-MCNC: 296 MG/DL (ref 70–130)
GLUCOSE BLDC GLUCOMTR-MCNC: 309 MG/DL (ref 70–130)
GLUCOSE BLDC GLUCOMTR-MCNC: 396 MG/DL (ref 70–130)
GLUCOSE BLDC GLUCOMTR-MCNC: 482 MG/DL (ref 70–130)
GLUCOSE SERPL-MCNC: 488 MG/DL (ref 65–99)
GLUCOSE UR STRIP-MCNC: ABNORMAL MG/DL
HCO3 BLDV-SCNC: 20.4 MMOL/L (ref 22–26)
HCT VFR BLD AUTO: 43.8 % (ref 34–46.6)
HGB BLD-MCNC: 15 G/DL (ref 12–15.9)
HGB BLDA-MCNC: 15.6 G/DL (ref 11.7–14.6)
HGB UR QL STRIP.AUTO: ABNORMAL
HOLD SPECIMEN: NORMAL
HOLD SPECIMEN: NORMAL
HYALINE CASTS UR QL AUTO: ABNORMAL /LPF
IMM GRANULOCYTES # BLD AUTO: 0.04 10*3/MM3 (ref 0–0.05)
IMM GRANULOCYTES NFR BLD AUTO: 0.4 % (ref 0–0.5)
INHALED O2 CONCENTRATION: 21 %
KETONES UR QL STRIP: ABNORMAL
LEUKOCYTE ESTERASE UR QL STRIP.AUTO: NEGATIVE
LYMPHOCYTES # BLD AUTO: 2.35 10*3/MM3 (ref 0.7–3.1)
LYMPHOCYTES NFR BLD AUTO: 25.2 % (ref 19.6–45.3)
MCH RBC QN AUTO: 27.8 PG (ref 26.6–33)
MCHC RBC AUTO-ENTMCNC: 34.2 G/DL (ref 31.5–35.7)
MCV RBC AUTO: 81.1 FL (ref 79–97)
METHGB BLD QL: 0.2 % (ref 0–1.5)
MODALITY: ABNORMAL
MONOCYTES # BLD AUTO: 0.77 10*3/MM3 (ref 0.1–0.9)
MONOCYTES NFR BLD AUTO: 8.3 % (ref 5–12)
NEUTROPHILS NFR BLD AUTO: 6.03 10*3/MM3 (ref 1.7–7)
NEUTROPHILS NFR BLD AUTO: 64.6 % (ref 42.7–76)
NITRITE UR QL STRIP: NEGATIVE
NRBC BLD AUTO-RTO: 0 /100 WBC (ref 0–0.2)
OXYHGB MFR BLDV: 85.4 % (ref 94–99)
PCO2 BLDV: 35.7 MM HG (ref 41–51)
PH BLDV: 7.38 PH UNITS (ref 7.31–7.41)
PH UR STRIP.AUTO: 5.5 [PH] (ref 5–8)
PLATELET # BLD AUTO: 230 10*3/MM3 (ref 140–450)
PMV BLD AUTO: 11.9 FL (ref 6–12)
PO2 BLDV: 57 MM HG (ref 35–42)
POTASSIUM SERPL-SCNC: 4.2 MMOL/L (ref 3.5–5.2)
PROT SERPL-MCNC: 7.5 G/DL (ref 6–8.5)
PROT UR QL STRIP: ABNORMAL
RBC # BLD AUTO: 5.4 10*6/MM3 (ref 3.77–5.28)
RBC # UR: ABNORMAL /HPF
REF LAB TEST METHOD: ABNORMAL
SAO2 % BLDCOV: 87.1 % (ref 95–99)
SODIUM SERPL-SCNC: 130 MMOL/L (ref 136–145)
SP GR UR STRIP: 1.02 (ref 1–1.03)
SQUAMOUS #/AREA URNS HPF: ABNORMAL /HPF
TROPONIN T SERPL-MCNC: <0.01 NG/ML (ref 0–0.03)
UROBILINOGEN UR QL STRIP: ABNORMAL
WBC # BLD AUTO: 9.33 10*3/MM3 (ref 3.4–10.8)
WBC UR QL AUTO: ABNORMAL /HPF
WHOLE BLOOD HOLD SPECIMEN: NORMAL

## 2021-07-11 PROCEDURE — 82805 BLOOD GASES W/O2 SATURATION: CPT | Performed by: EMERGENCY MEDICINE

## 2021-07-11 PROCEDURE — 99284 EMERGENCY DEPT VISIT MOD MDM: CPT

## 2021-07-11 PROCEDURE — 36415 COLL VENOUS BLD VENIPUNCTURE: CPT

## 2021-07-11 PROCEDURE — 85025 COMPLETE CBC W/AUTO DIFF WBC: CPT

## 2021-07-11 PROCEDURE — 71045 X-RAY EXAM CHEST 1 VIEW: CPT

## 2021-07-11 PROCEDURE — 84484 ASSAY OF TROPONIN QUANT: CPT | Performed by: EMERGENCY MEDICINE

## 2021-07-11 PROCEDURE — 82962 GLUCOSE BLOOD TEST: CPT

## 2021-07-11 PROCEDURE — 82009 KETONE BODYS QUAL: CPT | Performed by: EMERGENCY MEDICINE

## 2021-07-11 PROCEDURE — 80053 COMPREHEN METABOLIC PANEL: CPT

## 2021-07-11 PROCEDURE — 82820 HEMOGLOBIN-OXYGEN AFFINITY: CPT | Performed by: EMERGENCY MEDICINE

## 2021-07-11 PROCEDURE — 71045 X-RAY EXAM CHEST 1 VIEW: CPT | Performed by: RADIOLOGY

## 2021-07-11 PROCEDURE — 96360 HYDRATION IV INFUSION INIT: CPT

## 2021-07-11 PROCEDURE — 81001 URINALYSIS AUTO W/SCOPE: CPT

## 2021-07-11 RX ORDER — SODIUM CHLORIDE 0.9 % (FLUSH) 0.9 %
10 SYRINGE (ML) INJECTION AS NEEDED
Status: DISCONTINUED | OUTPATIENT
Start: 2021-07-11 | End: 2021-07-11 | Stop reason: HOSPADM

## 2021-07-11 RX ADMIN — SODIUM CHLORIDE 2000 ML: 9 INJECTION, SOLUTION INTRAVENOUS at 15:33

## 2021-07-11 NOTE — ED PROVIDER NOTES
Subjective   Pt is a 56 y.o. female with hx of DM that presents to ED from  via private vehicle for HYPERGLYCEMIA. This occurred today and is still present and has been constant. It is described as moderate. Nothing improves or worsens symptoms.    No emesis, diarrhea, or fever. Pt notes that she has a cough that is chronic and unchanged. No recent illness.       History provided by:  Patient      Review of Systems   Constitutional: Negative for chills, diaphoresis and fever.   HENT: Negative for ear discharge and nosebleeds.    Eyes: Negative for photophobia.   Respiratory: Negative for shortness of breath.    Cardiovascular: Negative for chest pain.   Gastrointestinal: Negative for diarrhea, nausea and vomiting.   Genitourinary: Negative for dysuria.   Musculoskeletal: Negative for back pain and neck pain.   Skin: Negative for rash.   Neurological: Negative for headaches.   All other systems reviewed and are negative.      Past Medical History:   Diagnosis Date   • Breast cancer (CMS/HCC)     Invasive ductal, right  2016   • Diabetes mellitus type 1 (CMS/HCC)    • Drug therapy    • High cholesterol    • Hx of radiation therapy    • Hypertension    • Malignant neoplasm of upper-outer quadrant of right breast in female, estrogen receptor positive (CMS/HCC) 7/1/2021   • Osteopenia of lumbar spine 7/6/2021       Allergies   Allergen Reactions   • Lisinopril Unknown - Low Severity       Past Surgical History:   Procedure Laterality Date   • BREAST LUMPECTOMY      right   • LEFT VENTRICULAR ASSIST DEVICE     • OTHER SURGICAL HISTORY      BIOPSY       Family History   Problem Relation Age of Onset   • Breast cancer Other    • Breast cancer Mother        Social History     Socioeconomic History   • Marital status:      Spouse name: Not on file   • Number of children: 2   • Years of education: Not on file   • Highest education level: Not on file   Tobacco Use   • Smoking status: Never Smoker   Substance and Sexual  Activity   • Alcohol use: Never   • Drug use: Never         Objective   Physical Exam  Vitals and nursing note reviewed.   Constitutional:       General: She is not in acute distress.     Appearance: She is overweight.   HENT:      Head: Normocephalic and atraumatic.      Nose: Nose normal.   Eyes:      General: No scleral icterus.  Cardiovascular:      Rate and Rhythm: Normal rate.   Pulmonary:      Effort: Pulmonary effort is normal. No respiratory distress.   Abdominal:      General: There is no distension.   Musculoskeletal:         General: Normal range of motion.      Cervical back: Neck supple.      Right lower leg: No edema.      Left lower leg: No edema.   Skin:     General: Skin is warm and dry.   Neurological:      General: No focal deficit present.      Mental Status: She is alert and oriented to person, place, and time.      Sensory: No sensory deficit.      Motor: No weakness.         Procedures         ED Course                                            MDM  Number of Diagnoses or Management Options     Amount and/or Complexity of Data Reviewed  Clinical lab tests: reviewed  Tests in the radiology section of CPT®: reviewed  Decide to obtain previous medical records or to obtain history from someone other than the patient: yes  Review and summarize past medical records: yes      56-year-old female type II diabetic presents with several days of elevated blood sugar sometimes reading high on her home monitor.  No recent medication changes.  No recent diet changes.  No recent weight gain.  No recent chest pain.  No recent fevers or chills or rashes cough or dysuria.  The patient was given multiple IV fluid boluses and her blood sugar trended down accordingly.  She was able to be discharged and will follow up with her PCP to further discuss her diabetes.  Final diagnoses:   Type 2 diabetes mellitus with hyperglycemia, without long-term current use of insulin (CMS/[facility])       Documentation assistance  provided by deann Bean.  Information recorded by the scribe was done at my direction and has been verified and validated by me.     Keren Bean  07/11/21 [id number]       Sina Slaughter DO  07/11/21 [id number]

## 2021-07-13 ENCOUNTER — OFFICE VISIT (OUTPATIENT)
Dept: FAMILY MEDICINE CLINIC | Facility: CLINIC | Age: 56
End: 2021-07-13

## 2021-07-13 VITALS
SYSTOLIC BLOOD PRESSURE: 130 MMHG | OXYGEN SATURATION: 99 % | BODY MASS INDEX: 34.37 KG/M2 | WEIGHT: 176 LBS | DIASTOLIC BLOOD PRESSURE: 72 MMHG | HEART RATE: 83 BPM | TEMPERATURE: 96.8 F

## 2021-07-13 DIAGNOSIS — E78.5 HYPERLIPIDEMIA, UNSPECIFIED HYPERLIPIDEMIA TYPE: ICD-10-CM

## 2021-07-13 DIAGNOSIS — E11.9 NON-INSULIN DEPENDENT TYPE 2 DIABETES MELLITUS (HCC): Primary | ICD-10-CM

## 2021-07-13 DIAGNOSIS — E11.22 TYPE 2 DIABETES MELLITUS WITH CHRONIC KIDNEY DISEASE, WITHOUT LONG-TERM CURRENT USE OF INSULIN, UNSPECIFIED CKD STAGE (HCC): ICD-10-CM

## 2021-07-13 PROBLEM — Z85.3 HISTORY OF BREAST CANCER: Status: ACTIVE | Noted: 2021-07-13

## 2021-07-13 LAB
CHOLEST SERPL-MCNC: 150 MG/DL (ref 0–200)
HBA1C MFR BLD: 12 % (ref 4.8–5.6)
HDLC SERPL-MCNC: 36 MG/DL (ref 40–60)
LDLC SERPL CALC-MCNC: 78 MG/DL (ref 0–100)
LDLC/HDLC SERPL: 1.96 {RATIO}
TRIGL SERPL-MCNC: 218 MG/DL (ref 0–150)
VLDLC SERPL-MCNC: 36 MG/DL (ref 5–40)

## 2021-07-13 PROCEDURE — 99214 OFFICE O/P EST MOD 30 MIN: CPT | Performed by: FAMILY MEDICINE

## 2021-07-13 PROCEDURE — 83036 HEMOGLOBIN GLYCOSYLATED A1C: CPT | Performed by: FAMILY MEDICINE

## 2021-07-13 PROCEDURE — 80061 LIPID PANEL: CPT | Performed by: FAMILY MEDICINE

## 2021-07-13 PROCEDURE — 36415 COLL VENOUS BLD VENIPUNCTURE: CPT | Performed by: FAMILY MEDICINE

## 2021-07-13 RX ORDER — GLIPIZIDE 5 MG/1
10 TABLET ORAL DAILY
Qty: 90 TABLET | Refills: 0 | Status: SHIPPED | OUTPATIENT
Start: 2021-07-13 | End: 2021-08-02

## 2021-07-13 NOTE — PROGRESS NOTES
Chief Complaint    Diabetes (ER F/U )    Subjective      Fiona Portillo presents to [facility]     History of Present Illness    1.) NIDDM : The patient presents after recent visit to the ER secondary to hyperglycemia. Blood glucose measured at greater than 400. She denied any recent illnesses or new medications. She reports that after discharge from the ER her blood sugar has been as high as greater than 200. Reports that she is taking her medications as prescribed. She denies any significant changes in her diet.  Her most recent A1c was measured at 7.5% earlier this year.    Objective      Vital Signs:     /72   Pulse 83   Temp 96.8 °F (36 °C)   Wt 79.8 kg (176 lb)   SpO2 99%   BMI 34.37 kg/m²       Physical Exam  Vitals reviewed.   Constitutional:       General: She is not in acute distress.     Appearance: Normal appearance. She is well-developed.   HENT:      Head: Normocephalic and atraumatic.      Right Ear: Hearing and external ear normal.      Left Ear: Hearing and external ear normal.      Nose: Nose normal.   Eyes:      General: Lids are normal.         Right eye: No discharge.         Left eye: No discharge.      Conjunctiva/sclera: Conjunctivae normal.   Cardiovascular:      Rate and Rhythm: Normal rate and regular rhythm.   Pulmonary:      Effort: Pulmonary effort is normal.      Breath sounds: Normal breath sounds.   Abdominal:      General: There is no distension.   Musculoskeletal:         General: No swelling.      Cervical back: Neck supple.   Skin:     Coloration: Skin is not jaundiced.      Findings: No erythema.   Neurological:      Mental Status: She is alert. Mental status is at baseline.   Psychiatric:         Mood and Affect: Mood and affect normal.         Thought Content: Thought content normal.     Assessment and Plan    Diagnoses and all orders for this visit:    1. Non-insulin dependent type 2 diabetes mellitus (CMS/HCC) (Primary)  -      Hemoglobin A1c; Future  -     Hemoglobin A1c    2. Hyperlipidemia, unspecified hyperlipidemia type  -     Lipid panel; Future  -     Lipid panel    · Labs as noted above. Dose of glipizide increased to 10 mg daily. The patient has been advised to log her blood glucose daily - fasting and before at least 1 major meal. Monitor diet and exercise.    Follow Up     Return in about 2 weeks (around 7/27/2021) for hyperglycemia/NIDDM.     Patient was given instructions and counseling regarding her condition or for health maintenance advice. Please see specific information pulled into the AVS if appropriate.

## 2021-07-13 NOTE — PROGRESS NOTES
Venipuncture Blood Specimen Collection  Venipuncture performed in left arm by Karley Blackwell with good hemostasis. Patient tolerated the procedure well without complications.   07/13/21   Karley Blackwell

## 2021-07-16 ENCOUNTER — OFFICE VISIT (OUTPATIENT)
Dept: FAMILY MEDICINE CLINIC | Facility: CLINIC | Age: 56
End: 2021-07-16

## 2021-07-16 VITALS
TEMPERATURE: 97.3 F | OXYGEN SATURATION: 97 % | DIASTOLIC BLOOD PRESSURE: 80 MMHG | WEIGHT: 174 LBS | HEART RATE: 80 BPM | BODY MASS INDEX: 33.98 KG/M2 | SYSTOLIC BLOOD PRESSURE: 134 MMHG

## 2021-07-16 DIAGNOSIS — E11.9 NON-INSULIN DEPENDENT TYPE 2 DIABETES MELLITUS (HCC): Primary | ICD-10-CM

## 2021-07-16 PROCEDURE — 99214 OFFICE O/P EST MOD 30 MIN: CPT | Performed by: FAMILY MEDICINE

## 2021-07-16 RX ORDER — GLIPIZIDE 5 MG/1
5 TABLET ORAL DAILY
Qty: 90 TABLET | Refills: 0 | Status: SHIPPED | OUTPATIENT
Start: 2021-07-16 | End: 2021-08-02

## 2021-07-16 NOTE — PROGRESS NOTES
Chief Complaint    Diabetes    Subjective      Fiona Portillo presents to [facility]     History of Present Illness     1.) NIDDM : Presents for review of most recent lab. Her A1c has increased drastically to 12.0% - which is an increase from 7.5% when her A1c was last checked. The patient was recently evaluated in the ER secondary to hyperglycemia. Also noted her labs with recent worsening of her kidney function.  The patient is followed by nephrology.    Objective      Vital Signs:     /80   Pulse 80   Temp 97.3 °F (36.3 °C)   Wt 78.9 kg (174 lb)   SpO2 97%   BMI 33.98 kg/m²       Physical Exam  Vitals reviewed.   Constitutional:       General: She is not in acute distress.     Appearance: Normal appearance. She is well-developed.   HENT:      Head: Normocephalic and atraumatic.      Right Ear: Hearing and external ear normal.      Left Ear: Hearing and external ear normal.      Nose: Nose normal.   Eyes:      General: Lids are normal.         Right eye: No discharge.         Left eye: No discharge.      Conjunctiva/sclera: Conjunctivae normal.   Pulmonary:      Effort: Pulmonary effort is normal.   Abdominal:      General: There is no distension.   Musculoskeletal:         General: No swelling.      Cervical back: Neck supple.   Skin:     Coloration: Skin is not jaundiced.      Findings: No erythema.   Neurological:      Mental Status: She is alert. Mental status is at baseline.   Psychiatric:         Mood and Affect: Mood and affect normal.         Thought Content: Thought content normal.      Assessment and Plan    Diagnoses and all orders for this visit:    1. Non-insulin dependent type 2 diabetes mellitus (CMS/HCC) (Primary)  Comments:  1.) Dose of glipizide increased to 50 mg.  Discussed with patient importance of diet and exercise.     Other orders  -     glipizide (Glucotrol) 5 MG tablet; Take 1 tablet by mouth Daily. Take along with 10 mg dose.  Dispense: 90  tablet; Refill: 0    Follow Up     Return in about 3 months (around 10/16/2021) for diabetes and other chronic conditions .     Patient was given instructions and counseling regarding her condition or for health maintenance advice. Please see specific information pulled into the AVS if appropriate.

## 2021-08-02 RX ORDER — GLIPIZIDE 5 MG/1
TABLET ORAL
Qty: 90 TABLET | Refills: 1 | Status: SHIPPED | OUTPATIENT
Start: 2021-08-02 | End: 2021-10-18 | Stop reason: SDUPTHER

## 2021-08-09 RX ORDER — VENLAFAXINE HYDROCHLORIDE 75 MG/1
CAPSULE, EXTENDED RELEASE ORAL
Qty: 180 CAPSULE | Refills: 5 | Status: SHIPPED | OUTPATIENT
Start: 2021-08-09 | End: 2021-10-18 | Stop reason: SDUPTHER

## 2021-08-09 RX ORDER — LANCETS 28 GAUGE
EACH MISCELLANEOUS
Qty: 100 EACH | Refills: 11 | Status: SHIPPED | OUTPATIENT
Start: 2021-08-09 | End: 2022-04-29 | Stop reason: SDUPTHER

## 2021-09-22 ENCOUNTER — TELEPHONE (OUTPATIENT)
Dept: ONCOLOGY | Facility: HOSPITAL | Age: 56
End: 2021-09-22

## 2021-09-22 DIAGNOSIS — M85.88 OSTEOPENIA OF LUMBAR SPINE: Primary | ICD-10-CM

## 2021-09-23 ENCOUNTER — OFFICE VISIT (OUTPATIENT)
Dept: ONCOLOGY | Facility: HOSPITAL | Age: 56
End: 2021-09-23

## 2021-09-23 ENCOUNTER — HOSPITAL ENCOUNTER (OUTPATIENT)
Dept: ONCOLOGY | Facility: HOSPITAL | Age: 56
Setting detail: INFUSION SERIES
Discharge: HOME | End: 2021-09-23

## 2021-09-23 ENCOUNTER — LAB (OUTPATIENT)
Dept: ONCOLOGY | Facility: HOSPITAL | Age: 56
End: 2021-09-23

## 2021-09-23 VITALS
OXYGEN SATURATION: 99 % | BODY MASS INDEX: 34.44 KG/M2 | DIASTOLIC BLOOD PRESSURE: 103 MMHG | RESPIRATION RATE: 16 BRPM | HEART RATE: 90 BPM | WEIGHT: 176.37 LBS | TEMPERATURE: 97.9 F | SYSTOLIC BLOOD PRESSURE: 119 MMHG

## 2021-09-23 DIAGNOSIS — Z17.0 MALIGNANT NEOPLASM OF UPPER-OUTER QUADRANT OF RIGHT BREAST IN FEMALE, ESTROGEN RECEPTOR POSITIVE (HCC): Primary | ICD-10-CM

## 2021-09-23 DIAGNOSIS — C50.411 MALIGNANT NEOPLASM OF UPPER-OUTER QUADRANT OF RIGHT BREAST IN FEMALE, ESTROGEN RECEPTOR POSITIVE (HCC): Primary | ICD-10-CM

## 2021-09-23 DIAGNOSIS — M85.88 OSTEOPENIA OF LUMBAR SPINE: ICD-10-CM

## 2021-09-23 DIAGNOSIS — M85.88 OSTEOPENIA OF LUMBAR SPINE: Primary | ICD-10-CM

## 2021-09-23 LAB
ALBUMIN SERPL-MCNC: 4.3 G/DL (ref 3.5–5.2)
ALBUMIN/GLOB SERPL: 1.9 G/DL
ALP SERPL-CCNC: 68 U/L (ref 39–117)
ALT SERPL W P-5'-P-CCNC: 25 U/L (ref 1–33)
ANION GAP SERPL CALCULATED.3IONS-SCNC: 11.7 MMOL/L (ref 5–15)
AST SERPL-CCNC: 19 U/L (ref 1–32)
BASOPHILS # BLD AUTO: 0.02 10*3/MM3 (ref 0–0.2)
BASOPHILS NFR BLD AUTO: 0.3 % (ref 0–1.5)
BILIRUB SERPL-MCNC: 0.3 MG/DL (ref 0–1.2)
BUN SERPL-MCNC: 29 MG/DL (ref 6–20)
BUN/CREAT SERPL: 19.6 (ref 7–25)
CALCIUM SPEC-SCNC: 9.7 MG/DL (ref 8.6–10.5)
CHLORIDE SERPL-SCNC: 100 MMOL/L (ref 98–107)
CO2 SERPL-SCNC: 24.3 MMOL/L (ref 22–29)
CREAT SERPL-MCNC: 1.48 MG/DL (ref 0.57–1)
DEPRECATED RDW RBC AUTO: 45.5 FL (ref 37–54)
EOSINOPHIL # BLD AUTO: 0.11 10*3/MM3 (ref 0–0.4)
EOSINOPHIL NFR BLD AUTO: 1.8 % (ref 0.3–6.2)
ERYTHROCYTE [DISTWIDTH] IN BLOOD BY AUTOMATED COUNT: 14.2 % (ref 12.3–15.4)
GFR SERPL CREATININE-BSD FRML MDRD: 37 ML/MIN/1.73
GLOBULIN UR ELPH-MCNC: 2.3 GM/DL
GLUCOSE SERPL-MCNC: 236 MG/DL (ref 65–99)
HCT VFR BLD AUTO: 39.8 % (ref 34–46.6)
HGB BLD-MCNC: 12.8 G/DL (ref 12–15.9)
IMM GRANULOCYTES # BLD AUTO: 0.04 10*3/MM3 (ref 0–0.05)
IMM GRANULOCYTES NFR BLD AUTO: 0.6 % (ref 0–0.5)
LYMPHOCYTES # BLD AUTO: 1.61 10*3/MM3 (ref 0.7–3.1)
LYMPHOCYTES NFR BLD AUTO: 26 % (ref 19.6–45.3)
MAGNESIUM SERPL-MCNC: 2.1 MG/DL (ref 1.6–2.6)
MCH RBC QN AUTO: 28.1 PG (ref 26.6–33)
MCHC RBC AUTO-ENTMCNC: 32.2 G/DL (ref 31.5–35.7)
MCV RBC AUTO: 87.3 FL (ref 79–97)
MONOCYTES # BLD AUTO: 0.59 10*3/MM3 (ref 0.1–0.9)
MONOCYTES NFR BLD AUTO: 9.5 % (ref 5–12)
NEUTROPHILS NFR BLD AUTO: 3.82 10*3/MM3 (ref 1.7–7)
NEUTROPHILS NFR BLD AUTO: 61.8 % (ref 42.7–76)
PHOSPHATE SERPL-MCNC: 3.4 MG/DL (ref 2.5–4.5)
PLATELET # BLD AUTO: 206 10*3/MM3 (ref 140–450)
PMV BLD AUTO: 10.3 FL (ref 6–12)
POTASSIUM SERPL-SCNC: 4.2 MMOL/L (ref 3.5–5.2)
PROT SERPL-MCNC: 6.6 G/DL (ref 6–8.5)
RBC # BLD AUTO: 4.56 10*6/MM3 (ref 3.77–5.28)
SODIUM SERPL-SCNC: 136 MMOL/L (ref 136–145)
WBC # BLD AUTO: 6.19 10*3/MM3 (ref 3.4–10.8)

## 2021-09-23 PROCEDURE — 96372 THER/PROPH/DIAG INJ SC/IM: CPT

## 2021-09-23 PROCEDURE — 36415 COLL VENOUS BLD VENIPUNCTURE: CPT

## 2021-09-23 PROCEDURE — 85025 COMPLETE CBC W/AUTO DIFF WBC: CPT

## 2021-09-23 PROCEDURE — 83735 ASSAY OF MAGNESIUM: CPT

## 2021-09-23 PROCEDURE — 80053 COMPREHEN METABOLIC PANEL: CPT

## 2021-09-23 PROCEDURE — 99214 OFFICE O/P EST MOD 30 MIN: CPT | Performed by: INTERNAL MEDICINE

## 2021-09-23 PROCEDURE — 84100 ASSAY OF PHOSPHORUS: CPT

## 2021-09-23 PROCEDURE — 25010000002 DENOSUMAB 60 MG/ML SOLUTION PREFILLED SYRINGE: Performed by: INTERNAL MEDICINE

## 2021-09-23 RX ADMIN — DENOSUMAB 60 MG: 60 INJECTION SUBCUTANEOUS at 10:34

## 2021-09-23 NOTE — PROGRESS NOTES
Chief Complaint  Breast Cancer and Follow-up    Ny, Marcellol, DO  Madut, Nyebol, DO    Subjective          Fiona Portillo presents to [facility] for follow-up of breast cancer and osteopenia.  She is on Prolia.  She has completed 5 years of adjuvant AI therapy.  She reports she is doing well.  She is trying to exercise.  She is having a little more trouble with her blood sugar and has a follow-up with her primary care provider in the near future.  She denies unusual aches or pains.  No new masses or adenopathy.    Oncology/Hematology History Overview Note   Right breast invasive ductal carcinoma ER/OH+  HER2+            Clinical Staging      Stage IA ([id number])            Treatments      Chemotherapy      12/30/15 thru 4/20/16 completed 6C TCHP--completed 1yr Herceptin      Arimidex   began. 6/25/16         3/14/18 Prolia initiated        Radiation Therapy      5/10/16 thru 6/24/16 completed 5700cGy right breast        Surgeries      11/23/2015 right sided lumpectomy and sentinel lymph node biopsy.         Malignant neoplasm of upper-outer quadrant of right breast in female, estrogen receptor positive (CMS/HCC)   7/1/2021 Initial Diagnosis    Breast CA (CMS/HCC)     7/6/2021 Cancer Staged    Staging form: Breast, AJCC 8th Edition  - Clinical: cT1c, cN0, cM0, ER+, OH+, HER2+ - Signed by Champ Estrada MD on 7/6/2021         Review of Systems   Constitutional: Negative for appetite change, diaphoresis, fatigue, fever, unexpected weight gain and unexpected weight loss.   HENT: Negative for hearing loss, mouth sores, sore throat, swollen glands, trouble swallowing and voice change.    Eyes: Negative for blurred vision.   Respiratory: Negative for cough, shortness of breath and wheezing.    Cardiovascular: Negative for chest pain and palpitations.   Gastrointestinal: Negative for abdominal pain, blood in stool, constipation, diarrhea, nausea and vomiting.   Endocrine: Negative  for cold intolerance and heat intolerance.   Genitourinary: Negative for difficulty urinating, dysuria, frequency, hematuria and urinary incontinence.   Musculoskeletal: Negative for arthralgias, back pain and myalgias.   Skin: Negative for rash, skin lesions and bruise.   Neurological: Negative for dizziness, seizures, weakness, numbness and headache.   Hematological: Does not bruise/bleed easily.   Psychiatric/Behavioral: Negative for depressed mood. The patient is not nervous/anxious.      Current Outpatient Medications on File Prior to Visit   Medication Sig Dispense Refill   • fexofenadine (ALLEGRA) 180 MG tablet fexofenadine 180 mg oral tablet take 1 tablet (180 mg) by oral route once daily   Active     • glipizide (GLUCOTROL) 5 MG tablet TAKE 1 TABLET DAILY BEFORE A MEAL 90 tablet 1   • Invokana 100 MG tablet tablet      • Lancets (freestyle) lancets USE AS DIRECTED 100 each 11   • Levoxyl 125 MCG tablet      • rosuvastatin (CRESTOR) 40 MG tablet      • SITagliptin (JANUVIA) 100 MG tablet Take 100 mg by mouth.     • telmisartan (Micardis) 40 MG tablet      • venlafaxine XR (EFFEXOR-XR) 75 MG 24 hr capsule TAKE 3 CAPSULES DAILY 180 capsule 5   • [DISCONTINUED] anastrozole (ARIMIDEX) 1 MG tablet      • [DISCONTINUED] ergocalciferol (ERGOCALCIFEROL) 1.25 MG (66680 UT) capsule Vitamin D2 50,000 unit oral capsule take 1 capsule (50,000 unit) by oral route once weekly   Active       No current facility-administered medications on file prior to visit.       Allergies   Allergen Reactions   • Lisinopril Cough and Unknown - Low Severity     Past Medical History:   Diagnosis Date   • High cholesterol    • Hx of radiation therapy    • Malignant neoplasm of upper-outer quadrant of right breast in female, estrogen receptor positive (CMS/HCC) 7/1/2021   • Osteopenia of lumbar spine 7/6/2021     Past Surgical History:   Procedure Laterality Date   • BREAST LUMPECTOMY      right   • LEFT VENTRICULAR ASSIST DEVICE     • OTHER  SURGICAL HISTORY      BIOPSY     Social History     Socioeconomic History   • Marital status:      Spouse name: Not on file   • Number of children: 2   • Years of education: Not on file   • Highest education level: Not on file   Tobacco Use   • Smoking status: Current Every Day Smoker     Types: Cigarettes   • Smokeless tobacco: Never Used   Vaping Use   • Vaping Use: Never used   Substance and Sexual Activity   • Alcohol use: Never   • Drug use: Never     Family History   Problem Relation Age of Onset   • Breast cancer Other    • Breast cancer Mother        Objective   Physical Exam  Vitals reviewed. Exam conducted with a chaperone present.   Constitutional:       General: She is not in acute distress.     Appearance: Normal appearance.   Cardiovascular:      Rate and Rhythm: Normal rate and regular rhythm.      Heart sounds: Normal heart sounds. No murmur heard.   No gallop.    Pulmonary:      Effort: Pulmonary effort is normal.      Breath sounds: Normal breath sounds.   Chest:      Breasts:         Left: Normal. No swelling, bleeding, inverted nipple, mass, nipple discharge, skin change or tenderness.       Abdominal:      General: Abdomen is flat. Bowel sounds are normal. There is no distension.      Palpations: Abdomen is soft.      Tenderness: There is no abdominal tenderness.   Musculoskeletal:      Cervical back: Neck supple.      Right lower leg: No edema.      Left lower leg: No edema.   Lymphadenopathy:      Cervical: No cervical adenopathy.      Upper Body:      Right upper body: No supraclavicular or axillary adenopathy.      Left upper body: No supraclavicular or axillary adenopathy.   Neurological:      Mental Status: She is alert and oriented to person, place, and time.   Psychiatric:         Mood and Affect: Mood normal.         Behavior: Behavior normal.         Vitals:    09/23/21 0947   BP: (!) 119/103   Pulse: 90   Resp: 16   Temp: 97.9 °F (36.6 °C)   SpO2: 99%   Weight: 80 kg (176 lb  5.9 oz)   PainSc: 0-No pain     ECOG score: 0         PHQ-9 Total Score: 0                  Result Review :   The following data was reviewed by: Champ Estrada MD on 09/23/2021:  Lab Results   Component Value Date    HGB 12.8 09/23/2021    HCT 39.8 09/23/2021    MCV 87.3 09/23/2021     09/23/2021    WBC 6.19 09/23/2021    NEUTROABS 3.82 09/23/2021    LYMPHSABS 1.61 09/23/2021    MONOSABS 0.59 09/23/2021    EOSABS 0.11 09/23/2021    BASOSABS 0.02 09/23/2021     Lab Results   Component Value Date    GLUCOSE 236 (H) 09/23/2021    BUN 29 (H) 09/23/2021    CREATININE 1.48 (H) 09/23/2021     09/23/2021    K 4.2 09/23/2021     09/23/2021    CO2 24.3 09/23/2021    CALCIUM 9.7 09/23/2021    PROTEINTOT 6.6 09/23/2021    ALBUMIN 4.30 09/23/2021    BILITOT 0.3 09/23/2021    ALKPHOS 68 09/23/2021    AST 19 09/23/2021    ALT 25 09/23/2021           Assessment and Plan    Diagnoses and all orders for this visit:    1. Malignant neoplasm of upper-outer quadrant of right breast in female, estrogen receptor positive (CMS/HCC) (Primary)  Assessment & Plan:  Patient has completed 5 years of adjuvant aromatase inhibitor therapy.  I see no evidence of disease recurrence by history or physical examination.  Mammogram will be due next July 2022.      2. Osteopenia of lumbar spine  Assessment & Plan:  Patient is on Prolia every 6 months.  Tolerating well.  No dental or jaw pain.  Calcium is normal.  She has tried exercise.  Proceed with Prolia as planned.  RTC 6 months for next injection.            Patient Follow Up: 6 months      Patient was given instructions and counseling regarding her condition or for health maintenance advice. Please see specific information pulled into the AVS if appropriate.     Champ Estrada MD    9/23/2021

## 2021-09-23 NOTE — ASSESSMENT & PLAN NOTE
Patient is on Prolia every 6 months.  Tolerating well.  No dental or jaw pain.  Calcium is normal.  She has tried exercise.  Proceed with Prolia as planned.  RTC 6 months for next injection.

## 2021-09-23 NOTE — ASSESSMENT & PLAN NOTE
Patient has completed 5 years of adjuvant aromatase inhibitor therapy.  I see no evidence of disease recurrence by history or physical examination.  Mammogram will be due next July 2022.

## 2021-10-05 RX ORDER — ROSUVASTATIN CALCIUM 40 MG/1
TABLET, COATED ORAL
Qty: 90 TABLET | Refills: 1 | Status: SHIPPED | OUTPATIENT
Start: 2021-10-05 | End: 2021-10-18 | Stop reason: SDUPTHER

## 2021-10-05 RX ORDER — TELMISARTAN 40 MG/1
TABLET ORAL
Qty: 90 TABLET | Refills: 1 | Status: SHIPPED | OUTPATIENT
Start: 2021-10-05 | End: 2021-10-18 | Stop reason: SDUPTHER

## 2021-10-18 ENCOUNTER — OFFICE VISIT (OUTPATIENT)
Dept: FAMILY MEDICINE CLINIC | Facility: CLINIC | Age: 56
End: 2021-10-18

## 2021-10-18 VITALS
SYSTOLIC BLOOD PRESSURE: 122 MMHG | DIASTOLIC BLOOD PRESSURE: 78 MMHG | BODY MASS INDEX: 35.15 KG/M2 | HEART RATE: 91 BPM | WEIGHT: 180 LBS | TEMPERATURE: 97.1 F | OXYGEN SATURATION: 94 %

## 2021-10-18 DIAGNOSIS — Z23 NEED FOR INFLUENZA VACCINATION: ICD-10-CM

## 2021-10-18 DIAGNOSIS — I10 PRIMARY HYPERTENSION: ICD-10-CM

## 2021-10-18 DIAGNOSIS — Z23 NEED FOR VACCINATION: ICD-10-CM

## 2021-10-18 DIAGNOSIS — E11.9 TYPE 2 DIABETES MELLITUS WITHOUT COMPLICATION, WITHOUT LONG-TERM CURRENT USE OF INSULIN (HCC): Primary | ICD-10-CM

## 2021-10-18 DIAGNOSIS — E78.5 HYPERLIPIDEMIA, UNSPECIFIED HYPERLIPIDEMIA TYPE: ICD-10-CM

## 2021-10-18 DIAGNOSIS — Z11.59 NEED FOR HEPATITIS C SCREENING TEST: ICD-10-CM

## 2021-10-18 LAB
ALBUMIN SERPL-MCNC: 4.8 G/DL (ref 3.5–5.2)
ALBUMIN/GLOB SERPL: 1.8 G/DL
ALP SERPL-CCNC: 78 U/L (ref 39–117)
ALT SERPL W P-5'-P-CCNC: 42 U/L (ref 1–33)
ANION GAP SERPL CALCULATED.3IONS-SCNC: 12.5 MMOL/L (ref 5–15)
AST SERPL-CCNC: 27 U/L (ref 1–32)
BASOPHILS # BLD AUTO: 0.04 10*3/MM3 (ref 0–0.2)
BASOPHILS NFR BLD AUTO: 0.5 % (ref 0–1.5)
BILIRUB SERPL-MCNC: 0.3 MG/DL (ref 0–1.2)
BUN SERPL-MCNC: 24 MG/DL (ref 6–20)
BUN/CREAT SERPL: 18.9 (ref 7–25)
CALCIUM SPEC-SCNC: 9.5 MG/DL (ref 8.6–10.5)
CHLORIDE SERPL-SCNC: 103 MMOL/L (ref 98–107)
CHOLEST SERPL-MCNC: 196 MG/DL (ref 0–200)
CO2 SERPL-SCNC: 24.5 MMOL/L (ref 22–29)
CREAT SERPL-MCNC: 1.27 MG/DL (ref 0.57–1)
DEPRECATED RDW RBC AUTO: 45.9 FL (ref 37–54)
EOSINOPHIL # BLD AUTO: 0.18 10*3/MM3 (ref 0–0.4)
EOSINOPHIL NFR BLD AUTO: 2.3 % (ref 0.3–6.2)
ERYTHROCYTE [DISTWIDTH] IN BLOOD BY AUTOMATED COUNT: 14.4 % (ref 12.3–15.4)
GFR SERPL CREATININE-BSD FRML MDRD: 44 ML/MIN/1.73
GLOBULIN UR ELPH-MCNC: 2.6 GM/DL
GLUCOSE SERPL-MCNC: 145 MG/DL (ref 65–99)
HBA1C MFR BLD: 8.2 % (ref 4.8–5.6)
HCT VFR BLD AUTO: 44.7 % (ref 34–46.6)
HCV AB SER DONR QL: NORMAL
HDLC SERPL-MCNC: 54 MG/DL (ref 40–60)
HGB BLD-MCNC: 14.4 G/DL (ref 12–15.9)
IMM GRANULOCYTES # BLD AUTO: 0.05 10*3/MM3 (ref 0–0.05)
IMM GRANULOCYTES NFR BLD AUTO: 0.6 % (ref 0–0.5)
LDLC SERPL CALC-MCNC: 112 MG/DL (ref 0–100)
LDLC/HDLC SERPL: 2 {RATIO}
LYMPHOCYTES # BLD AUTO: 2.3 10*3/MM3 (ref 0.7–3.1)
LYMPHOCYTES NFR BLD AUTO: 29.4 % (ref 19.6–45.3)
MCH RBC QN AUTO: 28.2 PG (ref 26.6–33)
MCHC RBC AUTO-ENTMCNC: 32.2 G/DL (ref 31.5–35.7)
MCV RBC AUTO: 87.5 FL (ref 79–97)
MONOCYTES # BLD AUTO: 0.74 10*3/MM3 (ref 0.1–0.9)
MONOCYTES NFR BLD AUTO: 9.5 % (ref 5–12)
NEUTROPHILS NFR BLD AUTO: 4.5 10*3/MM3 (ref 1.7–7)
NEUTROPHILS NFR BLD AUTO: 57.7 % (ref 42.7–76)
NRBC BLD AUTO-RTO: 0 /100 WBC (ref 0–0.2)
PLATELET # BLD AUTO: 269 10*3/MM3 (ref 140–450)
PMV BLD AUTO: 10.7 FL (ref 6–12)
POTASSIUM SERPL-SCNC: 3.9 MMOL/L (ref 3.5–5.2)
PROT SERPL-MCNC: 7.4 G/DL (ref 6–8.5)
RBC # BLD AUTO: 5.11 10*6/MM3 (ref 3.77–5.28)
SODIUM SERPL-SCNC: 140 MMOL/L (ref 136–145)
TRIGL SERPL-MCNC: 171 MG/DL (ref 0–150)
VLDLC SERPL-MCNC: 30 MG/DL (ref 5–40)
WBC # BLD AUTO: 7.81 10*3/MM3 (ref 3.4–10.8)

## 2021-10-18 PROCEDURE — 83036 HEMOGLOBIN GLYCOSYLATED A1C: CPT | Performed by: FAMILY MEDICINE

## 2021-10-18 PROCEDURE — 85025 COMPLETE CBC W/AUTO DIFF WBC: CPT | Performed by: FAMILY MEDICINE

## 2021-10-18 PROCEDURE — 80061 LIPID PANEL: CPT | Performed by: FAMILY MEDICINE

## 2021-10-18 PROCEDURE — 80053 COMPREHEN METABOLIC PANEL: CPT | Performed by: FAMILY MEDICINE

## 2021-10-18 PROCEDURE — 99214 OFFICE O/P EST MOD 30 MIN: CPT | Performed by: FAMILY MEDICINE

## 2021-10-18 PROCEDURE — 86803 HEPATITIS C AB TEST: CPT | Performed by: FAMILY MEDICINE

## 2021-10-18 PROCEDURE — 90471 IMMUNIZATION ADMIN: CPT | Performed by: FAMILY MEDICINE

## 2021-10-18 PROCEDURE — 82570 ASSAY OF URINE CREATININE: CPT | Performed by: FAMILY MEDICINE

## 2021-10-18 PROCEDURE — 90686 IIV4 VACC NO PRSV 0.5 ML IM: CPT | Performed by: FAMILY MEDICINE

## 2021-10-18 PROCEDURE — 36415 COLL VENOUS BLD VENIPUNCTURE: CPT | Performed by: FAMILY MEDICINE

## 2021-10-18 PROCEDURE — 82043 UR ALBUMIN QUANTITATIVE: CPT | Performed by: FAMILY MEDICINE

## 2021-10-18 RX ORDER — CANAGLIFLOZIN 100 MG/1
100 TABLET, FILM COATED ORAL DAILY
Qty: 90 TABLET | Refills: 1 | Status: SHIPPED | OUTPATIENT
Start: 2021-10-18 | End: 2022-02-07

## 2021-10-18 RX ORDER — ROSUVASTATIN CALCIUM 40 MG/1
40 TABLET, COATED ORAL DAILY
Qty: 90 TABLET | Refills: 1 | Status: SHIPPED | OUTPATIENT
Start: 2021-10-18 | End: 2022-06-10

## 2021-10-18 RX ORDER — FEXOFENADINE HCL 180 MG/1
180 TABLET ORAL DAILY
Qty: 90 TABLET | Refills: 1 | Status: SHIPPED | OUTPATIENT
Start: 2021-10-18

## 2021-10-18 RX ORDER — GLIPIZIDE 5 MG/1
5 TABLET ORAL DAILY
Qty: 90 TABLET | Refills: 1 | Status: SHIPPED | OUTPATIENT
Start: 2021-10-18 | End: 2021-10-25 | Stop reason: SDUPTHER

## 2021-10-18 RX ORDER — LEVOTHYROXINE SODIUM 125 UG/1
125 TABLET ORAL DAILY
Qty: 90 TABLET | Refills: 1 | Status: SHIPPED | OUTPATIENT
Start: 2021-10-18 | End: 2022-02-07

## 2021-10-18 RX ORDER — TELMISARTAN 40 MG/1
40 TABLET ORAL DAILY
Qty: 90 TABLET | Refills: 1 | Status: SHIPPED | OUTPATIENT
Start: 2021-10-18 | End: 2022-06-10

## 2021-10-18 RX ORDER — VENLAFAXINE HYDROCHLORIDE 75 MG/1
225 CAPSULE, EXTENDED RELEASE ORAL DAILY
Qty: 180 CAPSULE | Refills: 2 | Status: SHIPPED | OUTPATIENT
Start: 2021-10-18 | End: 2022-04-05 | Stop reason: SDUPTHER

## 2021-10-18 NOTE — PROGRESS NOTES
Chief Complaint    Diabetes (f/u on HA1C)    Subjective      Fiona Portillo presents to [facility]     History of Present Illness    1.) NIDDM : Most recent A1C - 12.0% - 7/2021. The patient denies any concerns with her current dose of medications. Most recent diabetic foot exam completed at [facility] in [place]. Patient reports no diagnosis of diabetic retinopathy. Reports fasting blood glucose as high as 170.      2.) HTN/HLD : BP - stable. Patient continues to improve her diet.     Objective      Vital Signs:     /78   Pulse 91   Temp 97.1 °F (36.2 °C)   Wt 81.6 kg (180 lb)   SpO2 94%   BMI 35.15 kg/m²       Physical Exam  Vitals reviewed.   Constitutional:       General: She is not in acute distress.     Appearance: Normal appearance. She is well-developed.      Comments: Morbid obesity    HENT:      Head: Normocephalic and atraumatic.      Right Ear: Hearing and external ear normal.      Left Ear: Hearing and external ear normal.      Nose: Nose normal.   Eyes:      General: Lids are normal.         Right eye: No discharge.         Left eye: No discharge.      Conjunctiva/sclera: Conjunctivae normal.   Cardiovascular:      Rate and Rhythm: Normal rate and regular rhythm.      Pulses:           Dorsalis pedis pulses are 2+ on the right side and 2+ on the left side.   Pulmonary:      Effort: Pulmonary effort is normal.      Breath sounds: Normal breath sounds.   Abdominal:      General: There is no distension.   Musculoskeletal:         General: No swelling.      Cervical back: Neck supple.   Feet:      Right foot:      Protective Sensation: 3 sites tested. 3 sites sensed.      Skin integrity: Skin integrity normal. No ulcer or blister.      Toenail Condition: Right toenails are normal.      Left foot:      Protective Sensation: 3 sites tested. 3 sites sensed.      Skin integrity: Skin integrity normal. No ulcer or blister.      Toenail Condition: Left toenails are  normal.      Comments:      Skin:     Coloration: Skin is not jaundiced.      Findings: No erythema.   Neurological:      Mental Status: She is alert. Mental status is at baseline.   Psychiatric:         Mood and Affect: Mood and affect normal.         Thought Content: Thought content normal.     Assessment and Plan    Diagnoses and all orders for this visit:    1. Type 2 diabetes mellitus without complication, without long-term current use of insulin (HCC) (Primary)  Comments:  1.) Labs as noted. Continue rx at current dose. Additional recommendations per review of labs.  Orders:  -     CBC and Differential  -     Comprehensive metabolic panel  -     Hemoglobin A1c  -     Cancel: Lipid panel  -     Microalbumin / Creatinine Urine Ratio - Urine, Clean Catch    2. Need for vaccination  Comments:  1.) Influenza vaccine administered in office today.    3. Need for hepatitis C screening test  -     Hepatitis C Antibody    4. Hyperlipidemia, unspecified hyperlipidemia type  Comments:  1.) Repeat lipid panel as noted. Continue with healthy diet choices. Continue statin at current dose.  Orders:  -     Lipid panel    5. Primary hypertension  Comments:  1.) Stable. Continue rx at current dose.     6. Body mass index 35.0-35.9, adult  Comments:  1.) Continue with healthy diet and exercise.    Other orders  -     fexofenadine (ALLEGRA) 180 MG tablet; Take 1 tablet by mouth Daily.  Dispense: 90 tablet; Refill: 1  -     glipizide (GLUCOTROL) 5 MG tablet; Take 1 tablet by mouth Daily.  Dispense: 90 tablet; Refill: 1  -     Invokana 100 MG tablet tablet; Take 1 tablet by mouth Daily.  Dispense: 90 tablet; Refill: 1  -     Levoxyl 125 MCG tablet; Take 1 tablet by mouth Daily.  Dispense: 90 tablet; Refill: 1  -     rosuvastatin (CRESTOR) 40 MG tablet; Take 1 tablet by mouth Daily.  Dispense: 90 tablet; Refill: 1  -     SITagliptin (JANUVIA) 100 MG tablet; Take 1 tablet by mouth Daily.  Dispense: 90 tablet; Refill: 1  -      telmisartan (MICARDIS) 40 MG tablet; Take 1 tablet by mouth Daily.  Dispense: 90 tablet; Refill: 1  -     venlafaxine XR (EFFEXOR-XR) 75 MG 24 hr capsule; Take 3 capsules by mouth Daily.  Dispense: 180 capsule; Refill: 2    Follow Up     Return in about 3 months (around 1/18/2022) for NIDDM and other chronic conditions.     Patient was given instructions and counseling regarding her condition or for health maintenance advice. Please see specific information pulled into the AVS if appropriate.

## 2021-10-18 NOTE — PROGRESS NOTES
Venipuncture Blood Specimen Collection  Venipuncture performed in left arm by Karley Blackwell with good hemostasis. Patient tolerated the procedure well without complications.   10/18/21   Karley Blackwell

## 2021-10-19 LAB
ALBUMIN UR-MCNC: <1.2 MG/DL
CREAT UR-MCNC: 39.7 MG/DL
MICROALBUMIN/CREAT UR: NORMAL MG/G{CREAT}

## 2021-10-25 RX ORDER — GLIPIZIDE 5 MG/1
10 TABLET ORAL DAILY
Qty: 180 TABLET | Refills: 1
Start: 2021-10-25 | End: 2021-12-10 | Stop reason: SDUPTHER

## 2021-12-13 RX ORDER — GLIPIZIDE 5 MG/1
10 TABLET ORAL DAILY
Qty: 180 TABLET | Refills: 1
Start: 2021-12-13 | End: 2022-04-29 | Stop reason: SDUPTHER

## 2022-01-14 ENCOUNTER — CLINICAL SUPPORT (OUTPATIENT)
Dept: FAMILY MEDICINE CLINIC | Facility: CLINIC | Age: 57
End: 2022-01-14

## 2022-01-14 DIAGNOSIS — Z23 NEED FOR COVID-19 VACCINE: Primary | ICD-10-CM

## 2022-01-14 PROCEDURE — 91300 COVID-19 (PFIZER): CPT | Performed by: FAMILY MEDICINE

## 2022-01-14 PROCEDURE — 0001A COVID-19 (PFIZER): CPT | Performed by: FAMILY MEDICINE

## 2022-02-07 RX ORDER — SITAGLIPTIN 100 MG/1
TABLET, FILM COATED ORAL
Qty: 90 TABLET | Refills: 3 | Status: SHIPPED | OUTPATIENT
Start: 2022-02-07 | End: 2022-03-11

## 2022-02-07 RX ORDER — CANAGLIFLOZIN 100 MG/1
TABLET, FILM COATED ORAL
Qty: 90 TABLET | Refills: 3 | Status: SHIPPED | OUTPATIENT
Start: 2022-02-07 | End: 2022-04-29 | Stop reason: SDUPTHER

## 2022-02-07 RX ORDER — LEVOTHYROXINE SODIUM 0.12 MG/1
TABLET ORAL
Qty: 90 TABLET | Refills: 3 | Status: SHIPPED | OUTPATIENT
Start: 2022-02-07 | End: 2023-01-09

## 2022-03-11 RX ORDER — SITAGLIPTIN 100 MG/1
TABLET, FILM COATED ORAL
Qty: 90 TABLET | Refills: 0 | Status: SHIPPED | OUTPATIENT
Start: 2022-03-11 | End: 2022-04-29 | Stop reason: SDUPTHER

## 2022-03-23 ENCOUNTER — LAB (OUTPATIENT)
Dept: ONCOLOGY | Facility: HOSPITAL | Age: 57
End: 2022-03-23

## 2022-03-23 ENCOUNTER — OFFICE VISIT (OUTPATIENT)
Dept: ONCOLOGY | Facility: HOSPITAL | Age: 57
End: 2022-03-23

## 2022-03-23 ENCOUNTER — HOSPITAL ENCOUNTER (OUTPATIENT)
Dept: ONCOLOGY | Facility: HOSPITAL | Age: 57
Setting detail: INFUSION SERIES
Discharge: HOME | End: 2022-03-23

## 2022-03-23 VITALS
WEIGHT: 172.4 LBS | OXYGEN SATURATION: 97 % | TEMPERATURE: 97.2 F | DIASTOLIC BLOOD PRESSURE: 51 MMHG | RESPIRATION RATE: 18 BRPM | BODY MASS INDEX: 33.67 KG/M2 | HEART RATE: 81 BPM | SYSTOLIC BLOOD PRESSURE: 100 MMHG

## 2022-03-23 DIAGNOSIS — M85.88 OSTEOPENIA OF LUMBAR SPINE: Primary | ICD-10-CM

## 2022-03-23 DIAGNOSIS — Z12.31 SCREENING MAMMOGRAM FOR BREAST CANCER: Primary | ICD-10-CM

## 2022-03-23 DIAGNOSIS — C50.411 MALIGNANT NEOPLASM OF UPPER-OUTER QUADRANT OF RIGHT BREAST IN FEMALE, ESTROGEN RECEPTOR POSITIVE: ICD-10-CM

## 2022-03-23 DIAGNOSIS — M85.88 OSTEOPENIA OF LUMBAR SPINE: ICD-10-CM

## 2022-03-23 DIAGNOSIS — Z17.0 MALIGNANT NEOPLASM OF UPPER-OUTER QUADRANT OF RIGHT BREAST IN FEMALE, ESTROGEN RECEPTOR POSITIVE: ICD-10-CM

## 2022-03-23 LAB
ALBUMIN SERPL-MCNC: 4.62 G/DL (ref 3.5–5.2)
ALBUMIN/GLOB SERPL: 1.8 G/DL
ALP SERPL-CCNC: 70 U/L (ref 39–117)
ALT SERPL W P-5'-P-CCNC: 43 U/L (ref 1–33)
ANION GAP SERPL CALCULATED.3IONS-SCNC: 9.2 MMOL/L (ref 5–15)
AST SERPL-CCNC: 29 U/L (ref 1–32)
BASOPHILS # BLD AUTO: 0.04 10*3/MM3 (ref 0–0.2)
BASOPHILS NFR BLD AUTO: 0.5 % (ref 0–1.5)
BILIRUB SERPL-MCNC: 0.5 MG/DL (ref 0–1.2)
BUN SERPL-MCNC: 27 MG/DL (ref 6–20)
BUN/CREAT SERPL: 17.4 (ref 7–25)
CALCIUM SPEC-SCNC: 10 MG/DL (ref 8.6–10.5)
CHLORIDE SERPL-SCNC: 99 MMOL/L (ref 98–107)
CO2 SERPL-SCNC: 25.8 MMOL/L (ref 22–29)
CREAT SERPL-MCNC: 1.55 MG/DL (ref 0.57–1)
DEPRECATED RDW RBC AUTO: 43.1 FL (ref 37–54)
EGFRCR SERPLBLD CKD-EPI 2021: 39.4 ML/MIN/1.73
EOSINOPHIL # BLD AUTO: 0.18 10*3/MM3 (ref 0–0.4)
EOSINOPHIL NFR BLD AUTO: 2.1 % (ref 0.3–6.2)
ERYTHROCYTE [DISTWIDTH] IN BLOOD BY AUTOMATED COUNT: 13.5 % (ref 12.3–15.4)
GLOBULIN UR ELPH-MCNC: 2.6 GM/DL
GLUCOSE SERPL-MCNC: 226 MG/DL (ref 65–99)
HCT VFR BLD AUTO: 42.3 % (ref 34–46.6)
HGB BLD-MCNC: 13.8 G/DL (ref 12–15.9)
IMM GRANULOCYTES # BLD AUTO: 0.05 10*3/MM3 (ref 0–0.05)
IMM GRANULOCYTES NFR BLD AUTO: 0.6 % (ref 0–0.5)
LYMPHOCYTES # BLD AUTO: 2.51 10*3/MM3 (ref 0.7–3.1)
LYMPHOCYTES NFR BLD AUTO: 29.7 % (ref 19.6–45.3)
MAGNESIUM SERPL-MCNC: 2 MG/DL (ref 1.6–2.6)
MCH RBC QN AUTO: 28 PG (ref 26.6–33)
MCHC RBC AUTO-ENTMCNC: 32.6 G/DL (ref 31.5–35.7)
MCV RBC AUTO: 85.8 FL (ref 79–97)
MONOCYTES # BLD AUTO: 0.78 10*3/MM3 (ref 0.1–0.9)
MONOCYTES NFR BLD AUTO: 9.2 % (ref 5–12)
NEUTROPHILS NFR BLD AUTO: 4.88 10*3/MM3 (ref 1.7–7)
NEUTROPHILS NFR BLD AUTO: 57.9 % (ref 42.7–76)
PHOSPHATE SERPL-MCNC: 3.7 MG/DL (ref 2.5–4.5)
PLATELET # BLD AUTO: 249 10*3/MM3 (ref 140–450)
PMV BLD AUTO: 9.9 FL (ref 6–12)
POTASSIUM SERPL-SCNC: 4 MMOL/L (ref 3.5–5.2)
PROT SERPL-MCNC: 7.2 G/DL (ref 6–8.5)
RBC # BLD AUTO: 4.93 10*6/MM3 (ref 3.77–5.28)
SODIUM SERPL-SCNC: 134 MMOL/L (ref 136–145)
WBC NRBC COR # BLD: 8.44 10*3/MM3 (ref 3.4–10.8)

## 2022-03-23 PROCEDURE — 25010000002 DENOSUMAB 60 MG/ML SOLUTION PREFILLED SYRINGE: Performed by: NURSE PRACTITIONER

## 2022-03-23 PROCEDURE — 36415 COLL VENOUS BLD VENIPUNCTURE: CPT

## 2022-03-23 PROCEDURE — 80053 COMPREHEN METABOLIC PANEL: CPT

## 2022-03-23 PROCEDURE — 96372 THER/PROPH/DIAG INJ SC/IM: CPT

## 2022-03-23 PROCEDURE — 99213 OFFICE O/P EST LOW 20 MIN: CPT | Performed by: NURSE PRACTITIONER

## 2022-03-23 PROCEDURE — 85025 COMPLETE CBC W/AUTO DIFF WBC: CPT

## 2022-03-23 PROCEDURE — G0463 HOSPITAL OUTPT CLINIC VISIT: HCPCS | Performed by: NURSE PRACTITIONER

## 2022-03-23 PROCEDURE — 84100 ASSAY OF PHOSPHORUS: CPT

## 2022-03-23 PROCEDURE — 83735 ASSAY OF MAGNESIUM: CPT

## 2022-03-23 RX ADMIN — DENOSUMAB 60 MG: 60 INJECTION SUBCUTANEOUS at 10:41

## 2022-03-23 NOTE — PROGRESS NOTES
Chief Complaint  Breast Cancer    Madut, Nyebol, DO  Madut, Nyebol, DO      Subjective          Fiona Portillo presents to [facility] for 6 month follow up breast cancer.     History of Present Illness   Ms. Fiona Portillo presents for 6 month follow for stage IA  Right breast cancer. She has completed 5 years of endocrine therapy and is doing well in the interim .She receives Prolia injections every 6 months for ostopenia.     She is tolerating well and offers no complaints. Lab work is stable. She has stable CKD. CBC is normal.       Cancer Staging  Malignant neoplasm of upper-outer quadrant of right breast in female, estrogen receptor positive (HCC)  Staging form: Breast, AJCC 8th Edition  - Clinical: cT1c, cN0, cM0, ER+, HI+, HER2+ - Signed by Champ Estrada MD on 7/6/2021       Treatment intent: curative    Oncology/Hematology History Overview Note   Right breast invasive ductal carcinoma ER/HI+  HER2+            Clinical Staging      Stage IA ([id number])            Treatments      Chemotherapy      12/30/15 thru 4/20/16 completed 6C TCHP--completed 1yr Herceptin      Arimidex   began. 6/25/16         3/14/18 Prolia initiated        Radiation Therapy      5/10/16 thru 6/24/16 completed 5700cGy right breast        Surgeries      11/23/2015 right sided lumpectomy and sentinel lymph node biopsy.         Malignant neoplasm of upper-outer quadrant of right breast in female, estrogen receptor positive (HCC)   7/1/2021 Initial Diagnosis    Breast CA (CMS/HCC)     7/6/2021 Cancer Staged    Staging form: Breast, AJCC 8th Edition  - Clinical: cT1c, cN0, cM0, ER+, HI+, HER2+ - Signed by Champ Estrada MD on 7/6/2021         Review of Systems   Constitutional: Negative for appetite change, diaphoresis, fatigue, fever, unexpected weight gain and unexpected weight loss.   HENT: Negative for hearing loss, mouth sores, sore throat, swollen glands, trouble swallowing and voice change.     Eyes: Negative for blurred vision.   Respiratory: Negative for cough, shortness of breath and wheezing.    Cardiovascular: Negative for chest pain and palpitations.   Gastrointestinal: Negative for abdominal pain, blood in stool, constipation, diarrhea, nausea and vomiting.   Endocrine: Negative for cold intolerance and heat intolerance.   Genitourinary: Negative for difficulty urinating, dysuria, frequency, hematuria and urinary incontinence.   Musculoskeletal: Negative for arthralgias, back pain and myalgias.   Skin: Negative for rash, skin lesions and wound.   Neurological: Negative for dizziness, seizures, weakness, numbness and headache.   Hematological: Does not bruise/bleed easily.   Psychiatric/Behavioral: Negative for depressed mood. The patient is not nervous/anxious.    All other systems reviewed and are negative.      Current Outpatient Medications on File Prior to Visit   Medication Sig Dispense Refill   • fexofenadine (ALLEGRA) 180 MG tablet Take 1 tablet by mouth Daily. 90 tablet 1   • glipizide (GLUCOTROL) 5 MG tablet Take 2 tablets by mouth Daily. 180 tablet 1   • Invokana 100 MG tablet tablet TAKE 1 TABLET DAILY BEFORE THE FIRST MEAL OF THE DAY 90 tablet 3   • Januvia 100 MG tablet TAKE 1 TABLET DAILY 90 tablet 0   • Lancets (freestyle) lancets USE AS DIRECTED 100 each 11   • levothyroxine (SYNTHROID, LEVOTHROID) 125 MCG tablet TAKE 1 TABLET DAILY 30 MINUTES BEFORE BREAKFAST ON AN EMPTY STOMACH 90 tablet 3   • rosuvastatin (CRESTOR) 40 MG tablet Take 1 tablet by mouth Daily. 90 tablet 1   • telmisartan (MICARDIS) 40 MG tablet Take 1 tablet by mouth Daily. 90 tablet 1   • venlafaxine XR (EFFEXOR-XR) 75 MG 24 hr capsule Take 3 capsules by mouth Daily. 180 capsule 2     No current facility-administered medications on file prior to visit.       Allergies   Allergen Reactions   • Lisinopril Cough and Unknown - Low Severity     Past Medical History:   Diagnosis Date   • High cholesterol    • Hx of  radiation therapy    • Malignant neoplasm of upper-outer quadrant of right breast in female, estrogen receptor positive (HCC) 7/1/2021   • Osteopenia of lumbar spine 7/6/2021     Past Surgical History:   Procedure Laterality Date   • BREAST LUMPECTOMY      right   • LEFT VENTRICULAR ASSIST DEVICE     • OTHER SURGICAL HISTORY      BIOPSY     Social History     Socioeconomic History   • Marital status:    • Number of children: 2   Tobacco Use   • Smoking status: Former Smoker     Types: Cigarettes   • Smokeless tobacco: Never Used   Vaping Use   • Vaping Use: Never used   Substance and Sexual Activity   • Alcohol use: Never   • Drug use: Never     Family History   Problem Relation Age of Onset   • Breast cancer Other    • Breast cancer Mother      Immunization History   Administered Date(s) Administered   • COVID-19 (CELE) 04/09/2021   • COVID-19 (PFIZER) PURPLE CAP 01/14/2022   • FluLaval/Fluarix/Fluzone >6 10/18/2021   • Influenza, Unspecified 10/05/2016, 12/08/2020   • Pneumococcal Polysaccharide (PPSV23) 04/24/2015   • Tdap 02/24/2015       Objective   Physical Exam  Vitals and nursing note reviewed.   Constitutional:       Appearance: Normal appearance. She is normal weight.   HENT:      Head: Atraumatic.      Nose: Nose normal.   Eyes:      Pupils: Pupils are equal, round, and reactive to light.   Cardiovascular:      Rate and Rhythm: Normal rate and regular rhythm.      Pulses: Normal pulses.      Heart sounds: Normal heart sounds.   Pulmonary:      Effort: Pulmonary effort is normal.      Breath sounds: Normal breath sounds.   Abdominal:      Palpations: Abdomen is soft.   Musculoskeletal:         General: Normal range of motion.      Cervical back: Normal range of motion and neck supple.   Skin:     General: Skin is warm and dry.      Capillary Refill: Capillary refill takes less than 2 seconds.   Neurological:      General: No focal deficit present.      Mental Status: She is alert and oriented to  person, place, and time.   Psychiatric:         Mood and Affect: Mood normal.         Behavior: Behavior normal.         Thought Content: Thought content normal.     breast exam: bilateral breast's, axillary area without any palpable adenopathy, lumps or masses noted.     Vitals:    03/23/22 0948   BP: 100/51   Pulse: 81   Resp: 18   Temp: 97.2 °F (36.2 °C)   SpO2: 97%   Weight: 78.2 kg (172 lb 6.4 oz)   PainSc: 0-No pain     ECOG score: 0         ECOG: (0) Fully Active - Able to Carry On All Pre-disease Performance Without Restriction  Fall Risk Assessment was completed, and patient is at low risk for falls.  PHQ-9 Total Score: 0       The patient is  experiencing fatigue. Fatigue score: 1    PT/OT Functional Screening: PT fx screen: No needs identified  Speech Functional Screening: Speech fx screen: No needs identified  Rehab to be ordered: Rehab to be ordered: No needs identified        Result Review :   The following data was reviewed by: MARIBEL Hines on 03/23/2022:  Lab Results   Component Value Date    HGB 13.8 03/23/2022    HCT 42.3 03/23/2022    MCV 85.8 03/23/2022     03/23/2022    WBC 8.44 03/23/2022    NEUTROABS 4.88 03/23/2022    LYMPHSABS 2.51 03/23/2022    MONOSABS 0.78 03/23/2022    EOSABS 0.18 03/23/2022    BASOSABS 0.04 03/23/2022     Lab Results   Component Value Date    GLUCOSE 226 (H) 03/23/2022    BUN 27 (H) 03/23/2022    CREATININE 1.55 (H) 03/23/2022     (L) 03/23/2022    K 4.0 03/23/2022    CL 99 03/23/2022    CO2 25.8 03/23/2022    CALCIUM 10.0 03/23/2022    PROTEINTOT 7.2 03/23/2022    ALBUMIN 4.62 03/23/2022    BILITOT 0.5 03/23/2022    ALKPHOS 70 03/23/2022    AST 29 03/23/2022    ALT 43 (H) 03/23/2022          Assessment and Plan    Diagnoses and all orders for this visit:    1. Screening mammogram for breast cancer (Primary)  -     Mammo Screening Bilateral With CAD; Future    2. Osteopenia of lumbar spine  -     DEXA Bone Density Axial; Future    3.  Malignant neoplasm of upper-outer quadrant of right breast in female, estrogen receptor positive (HCC)  -     DEXA Bone Density Axial; Future    Labs reviewed and are acceptable for Prolia injection today. Mammogram due in July 2022 and bone density due in September. She will follow up in 6 months with next Prolia injection and labs prior.     Continue Calcium and Vitamin D supplementation.   Call with any questions or concerns.           Patient Follow Up: 6 months.     Patient was given instructions and counseling regarding her condition or for health maintenance advice. Please see specific information pulled into the AVS if appropriate.     Kellie Enamorado, APRN    3/23/2022

## 2022-04-05 ENCOUNTER — OFFICE VISIT (OUTPATIENT)
Dept: FAMILY MEDICINE CLINIC | Facility: CLINIC | Age: 57
End: 2022-04-05

## 2022-04-05 VITALS
BODY MASS INDEX: 33.77 KG/M2 | DIASTOLIC BLOOD PRESSURE: 72 MMHG | WEIGHT: 172 LBS | HEART RATE: 110 BPM | TEMPERATURE: 97.3 F | SYSTOLIC BLOOD PRESSURE: 118 MMHG | HEIGHT: 60 IN | OXYGEN SATURATION: 98 %

## 2022-04-05 DIAGNOSIS — Z01.419 WELL WOMAN EXAM: Primary | ICD-10-CM

## 2022-04-05 DIAGNOSIS — Z85.3 HISTORY OF BREAST CANCER: ICD-10-CM

## 2022-04-05 DIAGNOSIS — N95.1 HOT FLASHES DUE TO MENOPAUSE: ICD-10-CM

## 2022-04-05 DIAGNOSIS — Z12.4 SCREENING FOR CERVICAL CANCER: ICD-10-CM

## 2022-04-05 DIAGNOSIS — F33.9 EPISODE OF RECURRENT MAJOR DEPRESSIVE DISORDER, UNSPECIFIED DEPRESSION EPISODE SEVERITY: ICD-10-CM

## 2022-04-05 PROCEDURE — 99396 PREV VISIT EST AGE 40-64: CPT | Performed by: FAMILY MEDICINE

## 2022-04-05 PROCEDURE — G0123 SCREEN CERV/VAG THIN LAYER: HCPCS | Performed by: FAMILY MEDICINE

## 2022-04-05 RX ORDER — BUPROPION HYDROCHLORIDE 150 MG/1
150 TABLET ORAL EVERY MORNING
Qty: 90 TABLET | Refills: 0 | Status: SHIPPED | OUTPATIENT
Start: 2022-04-05 | End: 2022-06-16

## 2022-04-05 RX ORDER — VENLAFAXINE HYDROCHLORIDE 75 MG/1
CAPSULE, EXTENDED RELEASE ORAL
Qty: 180 CAPSULE | Refills: 2 | Status: SHIPPED | OUTPATIENT
Start: 2022-04-05 | End: 2022-04-29 | Stop reason: SDUPTHER

## 2022-04-05 NOTE — PROGRESS NOTES
"Chief Complaint    Gynecologic Exam    Subjective      Fiona Portillo presents to [facility]    History of Present Illness    1.) WELL WOMAN EXAM : .  Most recent Pap test completed more than 3 years ago. Normal results. No history of an abnormal Pap. Patient denies any vaginal discharge. She notes that she is currently not sexually active. She does admit to decreased libido.  She is concerned that her decreased libido might be secondary to her dose of Effexor. No history of STD. History of breast cancer.  Mammogram scheduled for this year. She presents today with no breast complaints - recent visit with oncology. She does admit to hot flashes. She does admit that her hot flashes are bothersome.    Objective     Vital Signs:     /72   Pulse 110   Temp 97.3 °F (36.3 °C)   Ht 152.4 cm (60\")   Wt 78 kg (172 lb)   SpO2 98%   BMI 33.59 kg/m²       Physical Exam  Vitals reviewed. Exam conducted with a chaperone present (Zelda Pan MA).   Constitutional:       General: She is not in acute distress.     Appearance: Normal appearance. She is well-developed.   HENT:      Head: Normocephalic and atraumatic.      Right Ear: Hearing and external ear normal.      Left Ear: Hearing and external ear normal.      Nose: Nose normal.   Eyes:      General: Lids are normal.         Right eye: No discharge.         Left eye: No discharge.      Conjunctiva/sclera: Conjunctivae normal.   Pulmonary:      Effort: Pulmonary effort is normal.   Chest:   Breasts:      Right: No swelling, nipple discharge, skin change or tenderness.      Left: No swelling, nipple discharge, skin change or tenderness.            Comments: Post-surgical scar noted.   Abdominal:      General: There is no distension.   Genitourinary:     Labia:         Right: No lesion.         Left: No lesion.       Urethra: No urethral lesion.      Vagina: No vaginal discharge or tenderness.      Cervix: No discharge or lesion. "      Uterus: Not tender.       Adnexa:         Right: No tenderness.          Left: No tenderness.     Musculoskeletal:         General: No swelling.      Cervical back: Neck supple.   Skin:     Coloration: Skin is not jaundiced.      Findings: No erythema.   Neurological:      Mental Status: She is alert. Mental status is at baseline.   Psychiatric:         Mood and Affect: Mood and affect normal.         Thought Content: Thought content normal.     Assessment and Plan     Diagnoses and all orders for this visit:    1. Well woman exam (Primary)  -     Pap IG, Rfx HPV ASCU    2. History of breast cancer  Comments:  Mammogram scheduled.     3. Episode of recurrent major depressive disorder, unspecified depression episode severity (HCC)  Comments:  Wean off Effexor as noted and start Wellbutrin    4. Hot flashes due to menopause  Comments:  Will continue to monitor.     5. Screening for cervical cancer  -     Pap IG, Rfx HPV ASCU    Other orders  -     venlafaxine XR (EFFEXOR-XR) 75 MG 24 hr capsule; Take 3 capsules by mouth Daily for 7 days, THEN 2 capsules Daily for 7 days, THEN 1 capsule Daily for 7 days.  Dispense: 180 capsule; Refill: 2  -     buPROPion XL (Wellbutrin XL) 150 MG 24 hr tablet; Take 1 tablet by mouth Every Morning for 90 days.  Dispense: 90 tablet; Refill: 0    Follow Up     Return in about 3 months (around 7/5/2022).    Patient was given instructions and counseling regarding her condition or for health maintenance advice. Please see specific information pulled into the AVS if appropriate.

## 2022-04-11 LAB
CONV .: NORMAL
CYTOLOGIST CVX/VAG CYTO: NORMAL
CYTOLOGY CVX/VAG DOC CYTO: NORMAL
CYTOLOGY CVX/VAG DOC THIN PREP: NORMAL
DX ICD CODE: NORMAL
HIV 1 & 2 AB SER-IMP: NORMAL
OTHER STN SPEC: NORMAL
STAT OF ADQ CVX/VAG CYTO-IMP: NORMAL

## 2022-04-25 RX ORDER — GLIPIZIDE 5 MG/1
TABLET ORAL
Qty: 90 TABLET | Refills: 3 | OUTPATIENT
Start: 2022-04-25

## 2022-04-29 ENCOUNTER — OFFICE VISIT (OUTPATIENT)
Dept: FAMILY MEDICINE CLINIC | Facility: CLINIC | Age: 57
End: 2022-04-29

## 2022-04-29 VITALS
WEIGHT: 173.6 LBS | HEIGHT: 60 IN | SYSTOLIC BLOOD PRESSURE: 112 MMHG | OXYGEN SATURATION: 98 % | DIASTOLIC BLOOD PRESSURE: 64 MMHG | TEMPERATURE: 97.7 F | HEART RATE: 106 BPM | BODY MASS INDEX: 34.08 KG/M2

## 2022-04-29 DIAGNOSIS — J20.9 ACUTE BRONCHITIS, UNSPECIFIED ORGANISM: ICD-10-CM

## 2022-04-29 DIAGNOSIS — E11.22 TYPE 2 DIABETES MELLITUS WITH CHRONIC KIDNEY DISEASE, WITHOUT LONG-TERM CURRENT USE OF INSULIN, UNSPECIFIED CKD STAGE: Primary | ICD-10-CM

## 2022-04-29 DIAGNOSIS — F33.41 RECURRENT MAJOR DEPRESSIVE DISORDER, IN PARTIAL REMISSION: ICD-10-CM

## 2022-04-29 LAB
ALBUMIN SERPL-MCNC: 4.2 G/DL (ref 3.5–5.2)
ALBUMIN UR-MCNC: <1.2 MG/DL
ALBUMIN/GLOB SERPL: 1.2 G/DL
ALP SERPL-CCNC: 73 U/L (ref 39–117)
ALT SERPL W P-5'-P-CCNC: 32 U/L (ref 1–33)
ANION GAP SERPL CALCULATED.3IONS-SCNC: 17.2 MMOL/L (ref 5–15)
AST SERPL-CCNC: 21 U/L (ref 1–32)
BASOPHILS # BLD AUTO: 0.03 10*3/MM3 (ref 0–0.2)
BASOPHILS NFR BLD AUTO: 0.3 % (ref 0–1.5)
BILIRUB SERPL-MCNC: 0.5 MG/DL (ref 0–1.2)
BUN SERPL-MCNC: 19 MG/DL (ref 6–20)
BUN/CREAT SERPL: 15.7 (ref 7–25)
CALCIUM SPEC-SCNC: 9.7 MG/DL (ref 8.6–10.5)
CHLORIDE SERPL-SCNC: 98 MMOL/L (ref 98–107)
CHOLEST SERPL-MCNC: 152 MG/DL (ref 0–200)
CO2 SERPL-SCNC: 21.8 MMOL/L (ref 22–29)
CREAT SERPL-MCNC: 1.21 MG/DL (ref 0.57–1)
CREAT UR-MCNC: 84.6 MG/DL
DEPRECATED RDW RBC AUTO: 40.3 FL (ref 37–54)
EGFRCR SERPLBLD CKD-EPI 2021: 52.7 ML/MIN/1.73
EOSINOPHIL # BLD AUTO: 0.2 10*3/MM3 (ref 0–0.4)
EOSINOPHIL NFR BLD AUTO: 1.8 % (ref 0.3–6.2)
ERYTHROCYTE [DISTWIDTH] IN BLOOD BY AUTOMATED COUNT: 13.5 % (ref 12.3–15.4)
GLOBULIN UR ELPH-MCNC: 3.6 GM/DL
GLUCOSE SERPL-MCNC: 210 MG/DL (ref 65–99)
HBA1C MFR BLD: 10.5 % (ref 4.8–5.6)
HCT VFR BLD AUTO: 41.5 % (ref 34–46.6)
HDLC SERPL-MCNC: 43 MG/DL (ref 40–60)
HGB BLD-MCNC: 13.8 G/DL (ref 12–15.9)
IMM GRANULOCYTES # BLD AUTO: 0.11 10*3/MM3 (ref 0–0.05)
IMM GRANULOCYTES NFR BLD AUTO: 1 % (ref 0–0.5)
LDLC SERPL CALC-MCNC: 79 MG/DL (ref 0–100)
LDLC/HDLC SERPL: 1.7 {RATIO}
LYMPHOCYTES # BLD AUTO: 1.85 10*3/MM3 (ref 0.7–3.1)
LYMPHOCYTES NFR BLD AUTO: 17 % (ref 19.6–45.3)
MCH RBC QN AUTO: 27.7 PG (ref 26.6–33)
MCHC RBC AUTO-ENTMCNC: 33.3 G/DL (ref 31.5–35.7)
MCV RBC AUTO: 83.3 FL (ref 79–97)
MICROALBUMIN/CREAT UR: NORMAL MG/G{CREAT}
MONOCYTES # BLD AUTO: 0.97 10*3/MM3 (ref 0.1–0.9)
MONOCYTES NFR BLD AUTO: 8.9 % (ref 5–12)
NEUTROPHILS NFR BLD AUTO: 7.74 10*3/MM3 (ref 1.7–7)
NEUTROPHILS NFR BLD AUTO: 71 % (ref 42.7–76)
NRBC BLD AUTO-RTO: 0 /100 WBC (ref 0–0.2)
PLATELET # BLD AUTO: 325 10*3/MM3 (ref 140–450)
PMV BLD AUTO: 10.7 FL (ref 6–12)
POTASSIUM SERPL-SCNC: 4.3 MMOL/L (ref 3.5–5.2)
PROT SERPL-MCNC: 7.8 G/DL (ref 6–8.5)
RBC # BLD AUTO: 4.98 10*6/MM3 (ref 3.77–5.28)
SODIUM SERPL-SCNC: 137 MMOL/L (ref 136–145)
TRIGL SERPL-MCNC: 179 MG/DL (ref 0–150)
VLDLC SERPL-MCNC: 30 MG/DL (ref 5–40)
WBC NRBC COR # BLD: 10.9 10*3/MM3 (ref 3.4–10.8)

## 2022-04-29 PROCEDURE — 82570 ASSAY OF URINE CREATININE: CPT | Performed by: FAMILY MEDICINE

## 2022-04-29 PROCEDURE — 83036 HEMOGLOBIN GLYCOSYLATED A1C: CPT | Performed by: FAMILY MEDICINE

## 2022-04-29 PROCEDURE — 80061 LIPID PANEL: CPT | Performed by: FAMILY MEDICINE

## 2022-04-29 PROCEDURE — 80053 COMPREHEN METABOLIC PANEL: CPT | Performed by: FAMILY MEDICINE

## 2022-04-29 PROCEDURE — 99214 OFFICE O/P EST MOD 30 MIN: CPT | Performed by: FAMILY MEDICINE

## 2022-04-29 PROCEDURE — 85025 COMPLETE CBC W/AUTO DIFF WBC: CPT | Performed by: FAMILY MEDICINE

## 2022-04-29 PROCEDURE — 36415 COLL VENOUS BLD VENIPUNCTURE: CPT | Performed by: FAMILY MEDICINE

## 2022-04-29 PROCEDURE — 82043 UR ALBUMIN QUANTITATIVE: CPT | Performed by: FAMILY MEDICINE

## 2022-04-29 RX ORDER — GLIPIZIDE 5 MG/1
10 TABLET ORAL DAILY
Qty: 180 TABLET | Refills: 1
Start: 2022-04-29 | End: 2022-05-02 | Stop reason: SDUPTHER

## 2022-04-29 RX ORDER — BENZONATATE 200 MG/1
200 CAPSULE ORAL 3 TIMES DAILY PRN
Qty: 30 CAPSULE | Refills: 0 | Status: SHIPPED | OUTPATIENT
Start: 2022-04-29 | End: 2022-05-02

## 2022-04-29 RX ORDER — LANCETS 28 GAUGE
1 EACH MISCELLANEOUS DAILY
Qty: 200 EACH | Refills: 1 | Status: SHIPPED | OUTPATIENT
Start: 2022-04-29

## 2022-04-29 RX ORDER — CANAGLIFLOZIN 100 MG/1
1 TABLET, FILM COATED ORAL DAILY
Qty: 90 TABLET | Refills: 3 | Status: SHIPPED | OUTPATIENT
Start: 2022-04-29 | End: 2022-10-22

## 2022-04-29 RX ORDER — VENLAFAXINE HYDROCHLORIDE 75 MG/1
75 CAPSULE, EXTENDED RELEASE ORAL DAILY
Qty: 90 CAPSULE | Refills: 1 | Status: SHIPPED | OUTPATIENT
Start: 2022-04-29 | End: 2022-10-17 | Stop reason: SDUPTHER

## 2022-04-29 RX ORDER — PROMETHAZINE HYDROCHLORIDE AND CODEINE PHOSPHATE 6.25; 1 MG/5ML; MG/5ML
5 SOLUTION ORAL EVERY 4 HOURS PRN
Qty: 118 ML | Refills: 0 | Status: SHIPPED | OUTPATIENT
Start: 2022-04-29 | End: 2022-05-02

## 2022-04-29 NOTE — PROGRESS NOTES
Venipuncture Blood Specimen Collection  Venipuncture performed in left arm by Karley Blackwell with good hemostasis. Patient tolerated the procedure well without complications.   04/29/22   Karley Blackwell

## 2022-04-29 NOTE — PROGRESS NOTES
"Chief Complaint    Diabetes (Follow up)    Subjective      Fiona Portillo presents to [facility]    History of Present Illness    1.) NIDDM : Most recent A1c measured at 8.20% 6 months ago. Patient denies any complaints with her current dose of medications. She is due for dilated retinal exam. Reports worsening of her near-sighted vision.     2.) DEPRESSION : Stable. Recent attempt to wean patient off Wellbutrin, which she reports resulted in being more emotionally labile. She is not taking Wellbutrin plus Effexor once daily. She notes significant control of her symptoms with that regimen.    3.) COUGH : Recently diagnosed with influenza. Patient admits to a persistent cough. She is not c/o of any fevers or chills. She is not complaining of pleuritic chest pain. No SOA.    Objective     Vital Signs:     /64 (BP Location: Left arm, Patient Position: Sitting, Cuff Size: Adult)   Pulse 106   Temp 97.7 °F (36.5 °C) (Temporal)   Ht 152.4 cm (60\")   Wt 78.7 kg (173 lb 9.6 oz)   SpO2 98%   BMI 33.90 kg/m²       Physical Exam  Vitals reviewed.   Constitutional:       General: She is not in acute distress.     Appearance: Normal appearance. She is well-developed.   HENT:      Head: Normocephalic and atraumatic.      Right Ear: Hearing and external ear normal.      Left Ear: Hearing and external ear normal.      Nose: Nose normal.   Eyes:      General: Lids are normal.         Right eye: No discharge.         Left eye: No discharge.      Conjunctiva/sclera: Conjunctivae normal.   Pulmonary:      Effort: Pulmonary effort is normal. No respiratory distress.   Abdominal:      General: There is no distension.   Musculoskeletal:         General: No swelling.      Cervical back: Neck supple.   Skin:     Coloration: Skin is not jaundiced.      Findings: No erythema.   Neurological:      Mental Status: She is alert. Mental status is at baseline.   Psychiatric:         Mood and Affect: " Mood and affect normal.         Thought Content: Thought content normal.     Assessment and Plan     Diagnoses and all orders for this visit:    1. Type 2 diabetes mellitus with chronic kidney disease, without long-term current use of insulin, unspecified CKD stage (HCC) (Primary)  Comments:  Labs as noted. Advised to schedule dilated retinal exam. Continue diabetic medications at current dose. Additional recs per review of labs.  Orders:  -     CBC & Differential  -     Hemoglobin A1c  -     Lipid Panel  -     Microalbumin / Creatinine Urine Ratio - Urine, Clean Catch  -     Comprehensive Metabolic Panel    2. Recurrent major depressive disorder, in partial remission (HCC)  Comments:  Stable. Continue current dose of Wellbutrin + Effexor.     3. Acute bronchitis, unspecified organism  Comments:  Start antitussives PRN as noted below.   Orders:  -     promethazine-codeine (PHENERGAN with CODEINE) 6.25-10 MG/5ML solution; Take 5 mL by mouth Every 4 (Four) Hours As Needed for Cough.  Dispense: 118 mL; Refill: 0    Other orders  -     glipizide (GLUCOTROL) 5 MG tablet; Take 2 tablets by mouth Daily.  Dispense: 180 tablet; Refill: 1  -     Canagliflozin (Invokana) 100 MG tablet tablet; Take 1 tablet by mouth Daily. before the first meal of the day  Dispense: 90 tablet; Refill: 3  -     SITagliptin (Januvia) 100 MG tablet; Take 1 tablet by mouth Daily.  Dispense: 90 tablet; Refill: 1  -     Lancets (freestyle) lancets; 1 each by Other route Daily. Use as instructed  Dispense: 200 each; Refill: 1  -     venlafaxine XR (EFFEXOR-XR) 75 MG 24 hr capsule; Take 1 capsule by mouth Daily for 90 days.  Dispense: 90 capsule; Refill: 1  -     benzonatate (TESSALON) 200 MG capsule; Take 1 capsule by mouth 3 (Three) Times a Day As Needed for Cough.  Dispense: 30 capsule; Refill: 0    Follow Up     Return in about 3 months (around 7/29/2022).    Patient was given instructions and counseling regarding her condition or for health  maintenance advice. Please see specific information pulled into the AVS if appropriate.

## 2022-05-02 RX ORDER — GLIPIZIDE 5 MG/1
10 TABLET ORAL DAILY
Qty: 30 TABLET | Refills: 0
Start: 2022-05-02 | End: 2022-05-05 | Stop reason: SDUPTHER

## 2022-05-05 RX ORDER — GLIPIZIDE 5 MG/1
10 TABLET ORAL DAILY
Qty: 60 TABLET | Refills: 0
Start: 2022-05-05 | End: 2022-05-05 | Stop reason: SDUPTHER

## 2022-05-05 RX ORDER — GLIPIZIDE 5 MG/1
10 TABLET ORAL DAILY
Qty: 60 TABLET | Refills: 0
Start: 2022-05-05 | End: 2022-06-27 | Stop reason: SDUPTHER

## 2022-06-10 RX ORDER — TELMISARTAN 40 MG/1
TABLET ORAL
Qty: 90 TABLET | Refills: 3 | Status: SHIPPED | OUTPATIENT
Start: 2022-06-10

## 2022-06-10 RX ORDER — ROSUVASTATIN CALCIUM 40 MG/1
TABLET, COATED ORAL
Qty: 90 TABLET | Refills: 3 | Status: SHIPPED | OUTPATIENT
Start: 2022-06-10

## 2022-06-16 RX ORDER — BUPROPION HYDROCHLORIDE 150 MG/1
TABLET ORAL
Qty: 90 TABLET | Refills: 0 | Status: SHIPPED | OUTPATIENT
Start: 2022-06-16 | End: 2022-09-14

## 2022-06-27 ENCOUNTER — TELEPHONE (OUTPATIENT)
Dept: FAMILY MEDICINE CLINIC | Facility: CLINIC | Age: 57
End: 2022-06-27

## 2022-06-27 DIAGNOSIS — M85.88 OSTEOPENIA OF LUMBAR SPINE: Primary | ICD-10-CM

## 2022-06-27 RX ORDER — GLIPIZIDE 5 MG/1
10 TABLET ORAL DAILY
Qty: 60 TABLET | Refills: 5
Start: 2022-06-27 | End: 2022-07-20 | Stop reason: SDUPTHER

## 2022-06-27 RX ORDER — GLIPIZIDE 5 MG/1
TABLET ORAL
Qty: 60 TABLET | Refills: 0 | OUTPATIENT
Start: 2022-06-27

## 2022-07-05 ENCOUNTER — HOSPITAL ENCOUNTER (OUTPATIENT)
Dept: MAMMOGRAPHY | Facility: HOSPITAL | Age: 57
Discharge: HOME | End: 2022-07-05

## 2022-07-05 DIAGNOSIS — Z12.31 SCREENING MAMMOGRAM FOR BREAST CANCER: ICD-10-CM

## 2022-07-06 ENCOUNTER — HOSPITAL ENCOUNTER (OUTPATIENT)
Dept: MAMMOGRAPHY | Facility: HOSPITAL | Age: 57
Discharge: HOME | End: 2022-07-06
Admitting: NURSE PRACTITIONER

## 2022-07-06 PROCEDURE — 77067 SCR MAMMO BI INCL CAD: CPT

## 2022-07-20 ENCOUNTER — OFFICE VISIT (OUTPATIENT)
Dept: FAMILY MEDICINE CLINIC | Facility: CLINIC | Age: 57
End: 2022-07-20

## 2022-07-20 VITALS
BODY MASS INDEX: 33.65 KG/M2 | HEART RATE: 105 BPM | WEIGHT: 171.4 LBS | OXYGEN SATURATION: 98 % | DIASTOLIC BLOOD PRESSURE: 66 MMHG | SYSTOLIC BLOOD PRESSURE: 128 MMHG | TEMPERATURE: 98.1 F | HEIGHT: 60 IN

## 2022-07-20 DIAGNOSIS — J20.9 ACUTE BRONCHITIS, UNSPECIFIED ORGANISM: Primary | ICD-10-CM

## 2022-07-20 PROCEDURE — 99213 OFFICE O/P EST LOW 20 MIN: CPT | Performed by: FAMILY MEDICINE

## 2022-07-20 RX ORDER — GLIPIZIDE 5 MG/1
10 TABLET ORAL DAILY
Qty: 180 TABLET | Refills: 1 | Status: SHIPPED | OUTPATIENT
Start: 2022-07-20 | End: 2023-01-30 | Stop reason: SDUPTHER

## 2022-07-20 RX ORDER — AZITHROMYCIN 250 MG/1
TABLET, FILM COATED ORAL
Qty: 6 TABLET | Refills: 0 | Status: SHIPPED | OUTPATIENT
Start: 2022-07-20 | End: 2022-07-22 | Stop reason: SDUPTHER

## 2022-07-20 NOTE — PROGRESS NOTES
"Chief Complaint    Cough (Cough x 3 weeks (started after mowing yard, had severe nasal drainage).  Deep cough in the beginning, now a dry cough.  Not coughing up anything.) and Med Refill (Needs refill of Glipizide)    Subjective      Fiona Portillo presents to [facility]    History of Present Illness    1.) COUGH : Onset - 3 weeks ago. Unproductive. No c/o fevers or chills. No c/o loss of taste of smell. Improving.    Objective     Vital Signs:     /66 (BP Location: Left arm, Patient Position: Sitting, Cuff Size: Adult)   Pulse 105   Temp 98.1 °F (36.7 °C) (Temporal)   Ht 152.4 cm (60\")   Wt 77.7 kg (171 lb 6.4 oz)   SpO2 98%   BMI 33.47 kg/m²       Physical Exam  Vitals reviewed.   Constitutional:       General: She is not in acute distress.     Appearance: Normal appearance. She is well-developed.   HENT:      Head: Normocephalic and atraumatic.      Right Ear: Hearing and external ear normal.      Left Ear: Hearing and external ear normal.      Nose: Nose normal.   Eyes:      General: Lids are normal.         Right eye: No discharge.         Left eye: No discharge.      Conjunctiva/sclera: Conjunctivae normal.   Pulmonary:      Effort: Pulmonary effort is normal. No respiratory distress.   Abdominal:      General: There is no distension.   Musculoskeletal:         General: No swelling.      Cervical back: Neck supple.   Skin:     Coloration: Skin is not jaundiced.      Findings: No erythema.   Neurological:      Mental Status: She is alert. Mental status is at baseline.   Psychiatric:         Mood and Affect: Mood and affect normal.         Thought Content: Thought content normal.     Assessment and Plan     Diagnoses and all orders for this visit:    1. Acute bronchitis, unspecified organism (Primary)  Comments:  Due to duration of sxs, will start abx as noted. Patient is needing a refill of Glipizide - no c/o regarding dose - refilled as noted.    Other orders  -  "    glipizide (GLUCOTROL) 5 MG tablet; Take 2 tablets by mouth Daily for 90 days.  Dispense: 180 tablet; Refill: 1  -     azithromycin (Zithromax Z-Elton) 250 MG tablet; Take 2 tablets by mouth on day 1, then 1 tablet daily on days 2-5  Dispense: 6 tablet; Refill: 0    Follow Up      Return if symptoms worsen or fail to improve.    Patient was given instructions and counseling regarding her condition or for health maintenance advice. Please see specific information pulled into the AVS if appropriate.

## 2022-07-22 RX ORDER — AZITHROMYCIN 250 MG/1
TABLET, FILM COATED ORAL
Qty: 6 TABLET | Refills: 0 | Status: SHIPPED | OUTPATIENT
Start: 2022-07-22 | End: 2022-09-21

## 2022-09-12 ENCOUNTER — HOSPITAL ENCOUNTER (OUTPATIENT)
Dept: BONE DENSITY | Facility: HOSPITAL | Age: 57
Discharge: HOME | End: 2022-09-12
Admitting: NURSE PRACTITIONER

## 2022-09-12 DIAGNOSIS — M85.88 OSTEOPENIA OF LUMBAR SPINE: ICD-10-CM

## 2022-09-12 DIAGNOSIS — Z17.0 MALIGNANT NEOPLASM OF UPPER-OUTER QUADRANT OF RIGHT BREAST IN FEMALE, ESTROGEN RECEPTOR POSITIVE: ICD-10-CM

## 2022-09-12 DIAGNOSIS — C50.411 MALIGNANT NEOPLASM OF UPPER-OUTER QUADRANT OF RIGHT BREAST IN FEMALE, ESTROGEN RECEPTOR POSITIVE: ICD-10-CM

## 2022-09-12 PROCEDURE — 77080 DXA BONE DENSITY AXIAL: CPT

## 2022-09-14 RX ORDER — BUPROPION HYDROCHLORIDE 150 MG/1
TABLET ORAL
Qty: 90 TABLET | Refills: 0 | Status: SHIPPED | OUTPATIENT
Start: 2022-09-14 | End: 2022-12-16

## 2022-09-21 ENCOUNTER — OFFICE VISIT (OUTPATIENT)
Dept: ONCOLOGY | Facility: HOSPITAL | Age: 57
End: 2022-09-21

## 2022-09-21 ENCOUNTER — LAB (OUTPATIENT)
Dept: ONCOLOGY | Facility: HOSPITAL | Age: 57
End: 2022-09-21

## 2022-09-21 ENCOUNTER — HOSPITAL ENCOUNTER (OUTPATIENT)
Dept: ONCOLOGY | Facility: HOSPITAL | Age: 57
Setting detail: INFUSION SERIES
Discharge: HOME | End: 2022-09-21

## 2022-09-21 VITALS
TEMPERATURE: 97.1 F | WEIGHT: 173.06 LBS | BODY MASS INDEX: 33.8 KG/M2 | HEART RATE: 76 BPM | RESPIRATION RATE: 16 BRPM | OXYGEN SATURATION: 97 % | DIASTOLIC BLOOD PRESSURE: 56 MMHG | SYSTOLIC BLOOD PRESSURE: 100 MMHG

## 2022-09-21 DIAGNOSIS — Z17.0 MALIGNANT NEOPLASM OF UPPER-OUTER QUADRANT OF RIGHT BREAST IN FEMALE, ESTROGEN RECEPTOR POSITIVE: ICD-10-CM

## 2022-09-21 DIAGNOSIS — M85.88 OSTEOPENIA OF LUMBAR SPINE: Primary | ICD-10-CM

## 2022-09-21 DIAGNOSIS — C50.411 MALIGNANT NEOPLASM OF UPPER-OUTER QUADRANT OF RIGHT BREAST IN FEMALE, ESTROGEN RECEPTOR POSITIVE: ICD-10-CM

## 2022-09-21 DIAGNOSIS — M85.851 OSTEOPENIA OF RIGHT HIP: ICD-10-CM

## 2022-09-21 DIAGNOSIS — Z12.31 SCREENING MAMMOGRAM FOR BREAST CANCER: Primary | ICD-10-CM

## 2022-09-21 DIAGNOSIS — M85.88 OSTEOPENIA OF LUMBAR SPINE: ICD-10-CM

## 2022-09-21 LAB
ALBUMIN SERPL-MCNC: 4.54 G/DL (ref 3.5–5.2)
ALBUMIN/GLOB SERPL: 1.9 G/DL
ALP SERPL-CCNC: 78 U/L (ref 39–117)
ALT SERPL W P-5'-P-CCNC: 23 U/L (ref 1–33)
ANION GAP SERPL CALCULATED.3IONS-SCNC: 10.1 MMOL/L (ref 5–15)
AST SERPL-CCNC: 20 U/L (ref 1–32)
BASOPHILS # BLD AUTO: 0.03 10*3/MM3 (ref 0–0.2)
BASOPHILS NFR BLD AUTO: 0.4 % (ref 0–1.5)
BILIRUB SERPL-MCNC: 0.5 MG/DL (ref 0–1.2)
BUN SERPL-MCNC: 25 MG/DL (ref 6–20)
BUN/CREAT SERPL: 17.6 (ref 7–25)
CALCIUM SPEC-SCNC: 10 MG/DL (ref 8.6–10.5)
CHLORIDE SERPL-SCNC: 100 MMOL/L (ref 98–107)
CO2 SERPL-SCNC: 24.9 MMOL/L (ref 22–29)
CREAT SERPL-MCNC: 1.42 MG/DL (ref 0.57–1)
DEPRECATED RDW RBC AUTO: 45.5 FL (ref 37–54)
EGFRCR SERPLBLD CKD-EPI 2021: 43.5 ML/MIN/1.73
EOSINOPHIL # BLD AUTO: 0.3 10*3/MM3 (ref 0–0.4)
EOSINOPHIL NFR BLD AUTO: 4.1 % (ref 0.3–6.2)
ERYTHROCYTE [DISTWIDTH] IN BLOOD BY AUTOMATED COUNT: 14.6 % (ref 12.3–15.4)
GLOBULIN UR ELPH-MCNC: 2.4 GM/DL
GLUCOSE SERPL-MCNC: 178 MG/DL (ref 65–99)
HCT VFR BLD AUTO: 41.2 % (ref 34–46.6)
HGB BLD-MCNC: 13.3 G/DL (ref 12–15.9)
IMM GRANULOCYTES # BLD AUTO: 0.02 10*3/MM3 (ref 0–0.05)
IMM GRANULOCYTES NFR BLD AUTO: 0.3 % (ref 0–0.5)
LYMPHOCYTES # BLD AUTO: 1.73 10*3/MM3 (ref 0.7–3.1)
LYMPHOCYTES NFR BLD AUTO: 23.8 % (ref 19.6–45.3)
MAGNESIUM SERPL-MCNC: 1.8 MG/DL (ref 1.6–2.6)
MCH RBC QN AUTO: 27.3 PG (ref 26.6–33)
MCHC RBC AUTO-ENTMCNC: 32.3 G/DL (ref 31.5–35.7)
MCV RBC AUTO: 84.4 FL (ref 79–97)
MONOCYTES # BLD AUTO: 0.58 10*3/MM3 (ref 0.1–0.9)
MONOCYTES NFR BLD AUTO: 8 % (ref 5–12)
NEUTROPHILS NFR BLD AUTO: 4.6 10*3/MM3 (ref 1.7–7)
NEUTROPHILS NFR BLD AUTO: 63.4 % (ref 42.7–76)
PHOSPHATE SERPL-MCNC: 3.9 MG/DL (ref 2.5–4.5)
PLATELET # BLD AUTO: 217 10*3/MM3 (ref 140–450)
PMV BLD AUTO: 10.3 FL (ref 6–12)
POTASSIUM SERPL-SCNC: 4.1 MMOL/L (ref 3.5–5.2)
PROT SERPL-MCNC: 6.9 G/DL (ref 6–8.5)
RBC # BLD AUTO: 4.88 10*6/MM3 (ref 3.77–5.28)
SODIUM SERPL-SCNC: 135 MMOL/L (ref 136–145)
WBC NRBC COR # BLD: 7.26 10*3/MM3 (ref 3.4–10.8)

## 2022-09-21 PROCEDURE — 84100 ASSAY OF PHOSPHORUS: CPT

## 2022-09-21 PROCEDURE — 85025 COMPLETE CBC W/AUTO DIFF WBC: CPT

## 2022-09-21 PROCEDURE — 96372 THER/PROPH/DIAG INJ SC/IM: CPT

## 2022-09-21 PROCEDURE — 25010000002 DENOSUMAB 60 MG/ML SOLUTION PREFILLED SYRINGE: Performed by: NURSE PRACTITIONER

## 2022-09-21 PROCEDURE — 36415 COLL VENOUS BLD VENIPUNCTURE: CPT

## 2022-09-21 PROCEDURE — G0463 HOSPITAL OUTPT CLINIC VISIT: HCPCS | Performed by: NURSE PRACTITIONER

## 2022-09-21 PROCEDURE — 80053 COMPREHEN METABOLIC PANEL: CPT

## 2022-09-21 PROCEDURE — 99214 OFFICE O/P EST MOD 30 MIN: CPT | Performed by: NURSE PRACTITIONER

## 2022-09-21 PROCEDURE — 83735 ASSAY OF MAGNESIUM: CPT

## 2022-09-21 RX ADMIN — DENOSUMAB 60 MG: 60 INJECTION SUBCUTANEOUS at 10:46

## 2022-09-21 NOTE — PROGRESS NOTES
Chief Complaint  Mammogram Results     Champ Estrada MD Madut, Nyebol, DO      Subjective          Fiona Portillo presents to [facility] for breast cancer and Prolia injection.     History of Present Illness   Ms. Fiona Portillo presents for Prolia injection for osteoporosis. History of stage IA right breast cancer diagnosed in 12/2015. Completed chemotherapy, 1 year of Herceptin, lumpectomy and radiation. Completed 5 years of AI therapy ending in June of 2021.     Receives Prolia injection every 6 months. Recent dexa scan reviewed with patient. Completed mammogram in July 2022.     She offers no complaints today and reports she is tolerating Prolia injection well. Continues to take Caltrate daily.     Cancer Staging  Malignant neoplasm of upper-outer quadrant of right breast in female, estrogen receptor positive (HCC)  Staging form: Breast, AJCC 8th Edition  - Clinical: cT1c, cN0, cM0, ER+, MT+, HER2+ - Signed by Champ Estrada MD on 7/6/2021       Treatment intent: curative    Oncology/Hematology History Overview Note   Right breast invasive ductal carcinoma ER/MT+  HER2+            Clinical Staging      Stage IA ([id number])            Treatments      Chemotherapy      12/30/15 thru 4/20/16 completed 6C TCHP--completed 1yr Herceptin      Arimidex   began. 6/25/16         3/14/18 Prolia initiated        Radiation Therapy      5/10/16 thru 6/24/16 completed 5700cGy right breast        Surgeries      11/23/2015 right sided lumpectomy and sentinel lymph node biopsy.         Malignant neoplasm of upper-outer quadrant of right breast in female, estrogen receptor positive (HCC)   7/1/2021 Initial Diagnosis    Breast CA (CMS/HCC)     7/6/2021 Cancer Staged    Staging form: Breast, AJCC 8th Edition  - Clinical: cT1c, cN0, cM0, ER+, MT+, HER2+ - Signed by Champ Estrada MD on 7/6/2021         Review of Systems   Constitutional: Positive for fatigue. Negative for appetite  change, diaphoresis, fever, unexpected weight gain and unexpected weight loss.   HENT: Negative for hearing loss, sore throat and voice change.    Eyes: Negative for blurred vision, double vision, pain, redness and visual disturbance.   Respiratory: Negative for cough, shortness of breath and wheezing.    Cardiovascular: Negative for chest pain, palpitations and leg swelling.   Endocrine: Negative for cold intolerance, heat intolerance, polydipsia and polyuria.   Genitourinary: Negative for decreased urine volume, difficulty urinating, frequency and urinary incontinence.   Musculoskeletal: Negative for arthralgias, back pain, joint swelling and myalgias.   Skin: Negative for color change, rash, skin lesions and wound.   Neurological: Negative for dizziness, seizures, numbness and headache.   Hematological: Negative for adenopathy. Does not bruise/bleed easily.   Psychiatric/Behavioral: Negative for depressed mood. The patient is not nervous/anxious.    All other systems reviewed and are negative.      Current Outpatient Medications on File Prior to Visit   Medication Sig Dispense Refill   • buPROPion XL (WELLBUTRIN XL) 150 MG 24 hr tablet TAKE 1 TABLET EVERY MORNING 90 tablet 0   • Canagliflozin (Invokana) 100 MG tablet tablet Take 1 tablet by mouth Daily. before the first meal of the day 90 tablet 3   • fexofenadine (ALLEGRA) 180 MG tablet Take 1 tablet by mouth Daily. 90 tablet 1   • glipizide (GLUCOTROL) 5 MG tablet Take 2 tablets by mouth Daily for 90 days. 180 tablet 1   • Lancets (freestyle) lancets 1 each by Other route Daily. Use as instructed 200 each 1   • levothyroxine (SYNTHROID, LEVOTHROID) 125 MCG tablet TAKE 1 TABLET DAILY 30 MINUTES BEFORE BREAKFAST ON AN EMPTY STOMACH 90 tablet 3   • rosuvastatin (CRESTOR) 40 MG tablet TAKE 1 TABLET DAILY 90 tablet 3   • SITagliptin (Januvia) 100 MG tablet Take 1 tablet by mouth Daily. 90 tablet 1   • telmisartan (MICARDIS) 40 MG tablet TAKE 1 TABLET DAILY 90 tablet  3   • venlafaxine XR (EFFEXOR-XR) 75 MG 24 hr capsule Take 1 capsule by mouth Daily for 90 days. 90 capsule 1   • [DISCONTINUED] azithromycin (Zithromax Z-Elton) 250 MG tablet Take 2 tablets by mouth on day 1, then 1 tablet daily on days 2-5 6 tablet 0     Current Facility-Administered Medications on File Prior to Visit   Medication Dose Route Frequency Provider Last Rate Last Admin   • denosumab (PROLIA) syringe 60 mg  60 mg Subcutaneous Once Kellie Enamorado APRN           Allergies   Allergen Reactions   • Lisinopril Cough and Unknown - Low Severity     Past Medical History:   Diagnosis Date   • Depression    • Essential (primary) hypertension 2/19/2018   • High cholesterol    • Hx of radiation therapy    • Malignant neoplasm of upper-outer quadrant of right breast in female, estrogen receptor positive (HCC) 07/01/2021   • Osteopenia of lumbar spine 07/06/2021     Past Surgical History:   Procedure Laterality Date   • BREAST LUMPECTOMY      right   • LEFT VENTRICULAR ASSIST DEVICE     • OTHER SURGICAL HISTORY      BIOPSY     Social History     Socioeconomic History   • Marital status:    • Number of children: 2   Tobacco Use   • Smoking status: Never Smoker   • Smokeless tobacco: Never Used   Vaping Use   • Vaping Use: Never used   Substance and Sexual Activity   • Alcohol use: Never   • Drug use: Never     Family History   Problem Relation Age of Onset   • Breast cancer Other    • Breast cancer Mother      Immunization History   Administered Date(s) Administered   • COVID-19 (CELE) 04/09/2021   • COVID-19 (PFIZER) PURPLE CAP 01/14/2022   • FluLaval/Fluzone >6mos 10/18/2021   • Influenza, Unspecified 10/05/2016, 12/08/2020   • Pneumococcal Polysaccharide (PPSV23) 04/24/2015   • Shingrix 03/16/2022   • Tdap 02/24/2015       Objective   Physical Exam  Vitals and nursing note reviewed.   Constitutional:       Appearance: Normal appearance.   HENT:      Head: Normocephalic.      Nose: Nose normal.       Mouth/Throat:      Mouth: Mucous membranes are moist.   Eyes:      Pupils: Pupils are equal, round, and reactive to light.   Cardiovascular:      Rate and Rhythm: Normal rate and regular rhythm.      Pulses: Normal pulses.      Heart sounds: Normal heart sounds.   Pulmonary:      Effort: Pulmonary effort is normal.      Breath sounds: Normal breath sounds.   Abdominal:      General: Bowel sounds are normal.      Palpations: Abdomen is soft.   Musculoskeletal:         General: Normal range of motion.      Cervical back: Normal range of motion and neck supple.   Skin:     General: Skin is warm and dry.      Capillary Refill: Capillary refill takes less than 2 seconds.   Neurological:      General: No focal deficit present.      Mental Status: She is alert and oriented to person, place, and time.   Psychiatric:         Mood and Affect: Mood normal.         Behavior: Behavior normal.         Thought Content: Thought content normal.         Judgment: Judgment normal.         Vitals:    09/21/22 1009   BP: 100/56   Pulse: 76   Resp: 16   Temp: 97.1 °F (36.2 °C)   SpO2: 97%   Weight: 78.5 kg (173 lb 1 oz)   PainSc: 0-No pain     ECOG score: 0         ECOG: (0) Fully Active - Able to Carry On All Pre-disease Performance Without Restriction  Fall Risk Assessment was completed, and patient is at low risk for falls.  PHQ-9 Total Score: 0       The patient is  experiencing fatigue. Fatigue score: 4    PT/OT Functional Screening: PT fx screen: No needs identified  Speech Functional Screening: Speech fx screen: No needs identified  Rehab to be ordered:         Result Review :   The following data was reviewed by: MARIBEL Hines on 09/21/2022:  Lab Results   Component Value Date    HGB 13.3 09/21/2022    HCT 41.2 09/21/2022    MCV 84.4 09/21/2022     09/21/2022    WBC 7.26 09/21/2022    NEUTROABS 4.60 09/21/2022    LYMPHSABS 1.73 09/21/2022    MONOSABS 0.58 09/21/2022    EOSABS 0.30 09/21/2022    BASOSABS 0.03  09/21/2022     Lab Results   Component Value Date    GLUCOSE 178 (H) 09/21/2022    BUN 25 (H) 09/21/2022    CREATININE 1.42 (H) 09/21/2022     (L) 09/21/2022    K 4.1 09/21/2022     09/21/2022    CO2 24.9 09/21/2022    CALCIUM 10.0 09/21/2022    PROTEINTOT 6.9 09/21/2022    ALBUMIN 4.54 09/21/2022    BILITOT 0.5 09/21/2022    ALKPHOS 78 09/21/2022    AST 20 09/21/2022    ALT 23 09/21/2022          Assessment and Plan    Diagnoses and all orders for this visit:    1. Screening mammogram for breast cancer (Primary)  -     Mammo Screening Digital Tomosynthesis Bilateral With CAD; Future    2. Malignant neoplasm of upper-outer quadrant of right breast in female, estrogen receptor positive (HCC)    3. Osteopenia of right hip    6 month follow up with labs for Prolia injection. Recent dexa scan  On 9/12/2022 shows normal bone density in the LS. Normal density in the left hip and osteopenia in the right hip. Bone density has increased by 0.8 % compared to 2018. Labs reviewed. OK for Prolia injection today. We discussed how long to stay on Prolia injection. She started around March of 2018 and can stay on Prolia for up to 8 years. Discussed with Dr. Estrada.     We discussed bone density reports. We discussed mammogram report from July as well.     Mammogram for 1 year to be scheduled for July of 2023.     She will call with questions.     I left a message for her regarding the Prolia injections.         Patient Follow Up: 6 months with Prolia injection with MD.     Patient was given instructions and counseling regarding her condition or for health maintenance advice. Please see specific information pulled into the AVS if appropriate.     Kellie Enamorado, APRN    9/21/2022

## 2022-10-17 ENCOUNTER — OFFICE VISIT (OUTPATIENT)
Dept: FAMILY MEDICINE CLINIC | Facility: CLINIC | Age: 57
End: 2022-10-17

## 2022-10-17 VITALS
HEART RATE: 98 BPM | WEIGHT: 174 LBS | DIASTOLIC BLOOD PRESSURE: 72 MMHG | OXYGEN SATURATION: 100 % | TEMPERATURE: 98 F | SYSTOLIC BLOOD PRESSURE: 126 MMHG | HEIGHT: 60 IN | BODY MASS INDEX: 34.16 KG/M2

## 2022-10-17 DIAGNOSIS — E11.9 TYPE 2 DIABETES MELLITUS WITHOUT COMPLICATION, WITHOUT LONG-TERM CURRENT USE OF INSULIN: Primary | ICD-10-CM

## 2022-10-17 DIAGNOSIS — Z23 NEED FOR INFLUENZA VACCINATION: ICD-10-CM

## 2022-10-17 DIAGNOSIS — Z23 NEED FOR PNEUMOCOCCAL VACCINATION: ICD-10-CM

## 2022-10-17 LAB
ALBUMIN SERPL-MCNC: 4.4 G/DL (ref 3.5–5.2)
ALBUMIN/GLOB SERPL: 1.8 G/DL
ALP SERPL-CCNC: 93 U/L (ref 39–117)
ALT SERPL W P-5'-P-CCNC: 25 U/L (ref 1–33)
ANION GAP SERPL CALCULATED.3IONS-SCNC: 11.7 MMOL/L (ref 5–15)
AST SERPL-CCNC: 20 U/L (ref 1–32)
BASOPHILS # BLD AUTO: 0.04 10*3/MM3 (ref 0–0.2)
BASOPHILS NFR BLD AUTO: 0.6 % (ref 0–1.5)
BILIRUB SERPL-MCNC: 0.4 MG/DL (ref 0–1.2)
BUN SERPL-MCNC: 21 MG/DL (ref 6–20)
BUN/CREAT SERPL: 13.8 (ref 7–25)
CALCIUM SPEC-SCNC: 10.6 MG/DL (ref 8.6–10.5)
CHLORIDE SERPL-SCNC: 107 MMOL/L (ref 98–107)
CHOLEST SERPL-MCNC: 178 MG/DL (ref 0–200)
CO2 SERPL-SCNC: 24.3 MMOL/L (ref 22–29)
CREAT SERPL-MCNC: 1.52 MG/DL (ref 0.57–1)
DEPRECATED RDW RBC AUTO: 44.4 FL (ref 37–54)
EGFRCR SERPLBLD CKD-EPI 2021: 40.1 ML/MIN/1.73
EOSINOPHIL # BLD AUTO: 0.22 10*3/MM3 (ref 0–0.4)
EOSINOPHIL NFR BLD AUTO: 3.1 % (ref 0.3–6.2)
ERYTHROCYTE [DISTWIDTH] IN BLOOD BY AUTOMATED COUNT: 14.5 % (ref 12.3–15.4)
GLOBULIN UR ELPH-MCNC: 2.4 GM/DL
GLUCOSE SERPL-MCNC: 117 MG/DL (ref 65–99)
HBA1C MFR BLD: 8.5 % (ref 4.8–5.6)
HCT VFR BLD AUTO: 41.4 % (ref 34–46.6)
HDLC SERPL-MCNC: 59 MG/DL (ref 40–60)
HGB BLD-MCNC: 13.3 G/DL (ref 12–15.9)
IMM GRANULOCYTES # BLD AUTO: 0.03 10*3/MM3 (ref 0–0.05)
IMM GRANULOCYTES NFR BLD AUTO: 0.4 % (ref 0–0.5)
LDLC SERPL CALC-MCNC: 92 MG/DL (ref 0–100)
LDLC/HDLC SERPL: 1.48 {RATIO}
LYMPHOCYTES # BLD AUTO: 2.09 10*3/MM3 (ref 0.7–3.1)
LYMPHOCYTES NFR BLD AUTO: 29.1 % (ref 19.6–45.3)
MCH RBC QN AUTO: 27.2 PG (ref 26.6–33)
MCHC RBC AUTO-ENTMCNC: 32.1 G/DL (ref 31.5–35.7)
MCV RBC AUTO: 84.7 FL (ref 79–97)
MONOCYTES # BLD AUTO: 0.59 10*3/MM3 (ref 0.1–0.9)
MONOCYTES NFR BLD AUTO: 8.2 % (ref 5–12)
NEUTROPHILS NFR BLD AUTO: 4.22 10*3/MM3 (ref 1.7–7)
NEUTROPHILS NFR BLD AUTO: 58.6 % (ref 42.7–76)
NRBC BLD AUTO-RTO: 0 /100 WBC (ref 0–0.2)
PLATELET # BLD AUTO: 251 10*3/MM3 (ref 140–450)
PMV BLD AUTO: 11.2 FL (ref 6–12)
POTASSIUM SERPL-SCNC: 4.3 MMOL/L (ref 3.5–5.2)
PROT SERPL-MCNC: 6.8 G/DL (ref 6–8.5)
RBC # BLD AUTO: 4.89 10*6/MM3 (ref 3.77–5.28)
SODIUM SERPL-SCNC: 143 MMOL/L (ref 136–145)
TRIGL SERPL-MCNC: 157 MG/DL (ref 0–150)
VLDLC SERPL-MCNC: 27 MG/DL (ref 5–40)
WBC NRBC COR # BLD: 7.19 10*3/MM3 (ref 3.4–10.8)

## 2022-10-17 PROCEDURE — 80061 LIPID PANEL: CPT | Performed by: FAMILY MEDICINE

## 2022-10-17 PROCEDURE — 90472 IMMUNIZATION ADMIN EACH ADD: CPT | Performed by: FAMILY MEDICINE

## 2022-10-17 PROCEDURE — 90686 IIV4 VACC NO PRSV 0.5 ML IM: CPT | Performed by: FAMILY MEDICINE

## 2022-10-17 PROCEDURE — 80053 COMPREHEN METABOLIC PANEL: CPT | Performed by: FAMILY MEDICINE

## 2022-10-17 PROCEDURE — 90471 IMMUNIZATION ADMIN: CPT | Performed by: FAMILY MEDICINE

## 2022-10-17 PROCEDURE — 99213 OFFICE O/P EST LOW 20 MIN: CPT | Performed by: FAMILY MEDICINE

## 2022-10-17 PROCEDURE — 83036 HEMOGLOBIN GLYCOSYLATED A1C: CPT | Performed by: FAMILY MEDICINE

## 2022-10-17 PROCEDURE — 85025 COMPLETE CBC W/AUTO DIFF WBC: CPT | Performed by: FAMILY MEDICINE

## 2022-10-17 PROCEDURE — 36415 COLL VENOUS BLD VENIPUNCTURE: CPT | Performed by: FAMILY MEDICINE

## 2022-10-17 PROCEDURE — 90677 PCV20 VACCINE IM: CPT | Performed by: FAMILY MEDICINE

## 2022-10-17 RX ORDER — BLOOD-GLUCOSE METER
1 KIT MISCELLANEOUS DAILY
Qty: 1 EACH | Refills: 0 | Status: SHIPPED | OUTPATIENT
Start: 2022-10-17 | End: 2023-10-17

## 2022-10-17 RX ORDER — VENLAFAXINE HYDROCHLORIDE 75 MG/1
75 CAPSULE, EXTENDED RELEASE ORAL DAILY
Qty: 90 CAPSULE | Refills: 3 | Status: SHIPPED | OUTPATIENT
Start: 2022-10-17 | End: 2023-01-15

## 2022-10-17 NOTE — PROGRESS NOTES
Venipuncture Blood Specimen Collection  Venipuncture performed in left arm  by Gia Rodriguez with good hemostasis. Patient tolerated the procedure well without complications.   10/17/22   Gia Rodriguez

## 2022-10-17 NOTE — PROGRESS NOTES
"Chief Complaint    Diabetes (Follow-up.  Recently went out of town and misplaced her glucose monitoring kit.  Would like a new one sent in to pharmacy if possible.)    Subjective      Fiona Portillo presents to [facility]    History of Present Illness    1.) NIDDM : Most recent A1C - 10.50%. Patient reports that prior to misplacing her glucometer - BG no more than 150. Recent dilated retinal exam - reports changes per examiner.   No foot complaints during this visit. She does admit that she would like to simply her medications.     Objective     Vital Signs:     /72 (BP Location: Left arm, Patient Position: Sitting, Cuff Size: Adult)   Pulse 98   Temp 98 °F (36.7 °C) (Temporal)   Ht 152.4 cm (60\")   Wt 78.9 kg (174 lb)   SpO2 100%   BMI 33.98 kg/m²       Physical Exam  Vitals reviewed.   Constitutional:       General: She is not in acute distress.     Appearance: Normal appearance. She is well-developed.   HENT:      Head: Normocephalic and atraumatic.      Right Ear: Hearing and external ear normal.      Left Ear: Hearing and external ear normal.      Nose: Nose normal.   Eyes:      General: Lids are normal.         Right eye: No discharge.         Left eye: No discharge.      Conjunctiva/sclera: Conjunctivae normal.   Cardiovascular:      Rate and Rhythm: Normal rate and regular rhythm.   Pulmonary:      Effort: Pulmonary effort is normal.      Breath sounds: Normal breath sounds.   Abdominal:      General: There is no distension.   Musculoskeletal:         General: No swelling.      Cervical back: Neck supple.   Skin:     Coloration: Skin is not jaundiced.      Findings: No erythema.   Neurological:      Mental Status: She is alert. Mental status is at baseline.   Psychiatric:         Mood and Affect: Mood and affect normal.         Thought Content: Thought content normal.     Assessment and Plan     Diagnoses and all orders for this visit:    1. Type 2 diabetes " mellitus without complication, without long-term current use of insulin (HCC) (Primary)  Comments:  Additional recs per review of labs.  Orders:  -     CBC & Differential  -     Comprehensive Metabolic Panel  -     Hemoglobin A1c  -     Lipid Panel    2. Need for influenza vaccination  Comments:  Administered.   Orders:  -     FluLaval/Fluzone >6 mos (1952-0656)    3. Need for pneumococcal vaccination  Comments:  Administered.     Other orders  -     Cancel: Fluzone High-Dose 65+yrs ([id number])  -     Pneumococcal Conjugate Vaccine 20-Valent (PCV20)  -     venlafaxine XR (EFFEXOR-XR) 75 MG 24 hr capsule; Take 1 capsule by mouth Daily for 90 days.  Dispense: 90 capsule; Refill: 3  -     glucose monitoring kit (FREESTYLE) monitoring kit; 1 each Daily.  Dispense: 1 each; Refill: 0  -     glucose blood test strip; Use as instructed  Dispense: 200 each; Refill: 1    Follow Up     Return in about 3 months (around 1/17/2023).    Patient was given instructions and counseling regarding her condition or for health maintenance advice. Please see specific information pulled into the AVS if appropriate.

## 2022-10-20 ENCOUNTER — TELEPHONE (OUTPATIENT)
Dept: FAMILY MEDICINE CLINIC | Facility: CLINIC | Age: 57
End: 2022-10-20

## 2022-10-20 NOTE — TELEPHONE ENCOUNTER
PT CALLED AND SAID TO PROCEED WITH THE OZEMPIC INJECTIONS      MAIL ORDER  [facility]  [facility]   AMANDA

## 2022-10-22 NOTE — TELEPHONE ENCOUNTER
Sounds good. Please let her know to discontinue the Invokana and Januvia. Continue Glipizide at current dose. She will be starting with a low dose of the Ozempic and we will continue for 1 month. At the end of that month, please follow up in office.     Thank you!

## 2022-10-26 ENCOUNTER — TELEPHONE (OUTPATIENT)
Dept: FAMILY MEDICINE CLINIC | Facility: CLINIC | Age: 57
End: 2022-10-26

## 2022-10-26 NOTE — TELEPHONE ENCOUNTER
Pharmacy Name: [facility] - [facility] [address] [phone] [facility] [phone]      Pharmacy representative name: JUNE    Pharmacy representative phone number: [phone]  REFERENCE NUMBER: [id number]    What medication are you calling in regards to: Semaglutide-Weight Management 0.25 MG/0.5ML solution auto-injector      What question does the pharmacy have: THE PHARMACY WOULD LIKE A CALL BACK TO CLARIFY THE PRESCRIPTION ASAP. SHE STATED 0.25 IS OZEMPIC, BUT WEIGHT LOSS MEDICATION IS WEGOVY    Who is the provider that prescribed the medication: DR. THAPA

## 2022-11-23 NOTE — TELEPHONE ENCOUNTER
Caller: Fiona Portillo    Relationship: Self    Best call back number: [phone]    Who are you requesting to speak with (clinical staff, provider,  specific staff member): MEDICAL STAFF    What was the call regarding: THE OZEMPIC MEDICATION NEEDS A PRIOR AUTHORIZATION.

## 2022-12-16 RX ORDER — BUPROPION HYDROCHLORIDE 150 MG/1
TABLET ORAL
Qty: 90 TABLET | Refills: 0 | Status: SHIPPED | OUTPATIENT
Start: 2022-12-16 | End: 2023-03-14

## 2022-12-28 ENCOUNTER — CLINICAL SUPPORT (OUTPATIENT)
Dept: FAMILY MEDICINE CLINIC | Facility: CLINIC | Age: 57
End: 2022-12-28

## 2022-12-28 DIAGNOSIS — Z20.822 EXPOSURE TO COVID-19 VIRUS: Primary | ICD-10-CM

## 2022-12-28 LAB — SARS-COV-2 RNA RESP QL NAA+PROBE: DETECTED

## 2022-12-28 PROCEDURE — C9803 HOPD COVID-19 SPEC COLLECT: HCPCS

## 2022-12-28 PROCEDURE — 99211 OFF/OP EST MAY X REQ PHY/QHP: CPT | Performed by: NURSE PRACTITIONER

## 2022-12-28 PROCEDURE — U0004 COV-19 TEST NON-CDC HGH THRU: HCPCS | Performed by: NURSE PRACTITIONER

## 2022-12-29 ENCOUNTER — TELEPHONE (OUTPATIENT)
Dept: FAMILY MEDICINE CLINIC | Facility: CLINIC | Age: 57
End: 2022-12-29

## 2022-12-29 NOTE — TELEPHONE ENCOUNTER
Patient said that she received a call from [facility] that they do not have ozempic anymore and were wanting us to switch it to something else. I told her I would relay this message but that it would not be seen/answered until next week. She is okay with this.

## 2023-01-05 RX ORDER — DULAGLUTIDE 0.75 MG/.5ML
0.75 INJECTION, SOLUTION SUBCUTANEOUS WEEKLY
Qty: 6 ML | Refills: 0 | Status: SHIPPED | OUTPATIENT
Start: 2023-01-05 | End: 2023-03-13

## 2023-01-09 RX ORDER — LEVOTHYROXINE SODIUM 0.12 MG/1
TABLET ORAL
Qty: 90 TABLET | Refills: 3 | Status: SHIPPED | OUTPATIENT
Start: 2023-01-09

## 2023-01-16 ENCOUNTER — OFFICE VISIT (OUTPATIENT)
Dept: FAMILY MEDICINE CLINIC | Facility: CLINIC | Age: 58
End: 2023-01-16
Payer: OTHER GOVERNMENT

## 2023-01-16 VITALS
TEMPERATURE: 97.5 F | DIASTOLIC BLOOD PRESSURE: 68 MMHG | HEIGHT: 60 IN | BODY MASS INDEX: 34.08 KG/M2 | OXYGEN SATURATION: 97 % | WEIGHT: 173.6 LBS | HEART RATE: 102 BPM | SYSTOLIC BLOOD PRESSURE: 124 MMHG

## 2023-01-16 DIAGNOSIS — E11.9 TYPE 2 DIABETES MELLITUS WITHOUT COMPLICATION, WITHOUT LONG-TERM CURRENT USE OF INSULIN: Primary | ICD-10-CM

## 2023-01-16 DIAGNOSIS — E03.9 HYPOTHYROIDISM, UNSPECIFIED TYPE: ICD-10-CM

## 2023-01-16 LAB
ALBUMIN UR-MCNC: 3.7 MG/DL
CREAT UR-MCNC: 106.5 MG/DL
MICROALBUMIN/CREAT UR: 34.7 MG/G
TSH SERPL DL<=0.05 MIU/L-ACNC: 3.12 UIU/ML (ref 0.27–4.2)

## 2023-01-16 PROCEDURE — 82570 ASSAY OF URINE CREATININE: CPT | Performed by: FAMILY MEDICINE

## 2023-01-16 PROCEDURE — 84443 ASSAY THYROID STIM HORMONE: CPT | Performed by: FAMILY MEDICINE

## 2023-01-16 PROCEDURE — 82043 UR ALBUMIN QUANTITATIVE: CPT | Performed by: FAMILY MEDICINE

## 2023-01-16 PROCEDURE — 99214 OFFICE O/P EST MOD 30 MIN: CPT | Performed by: FAMILY MEDICINE

## 2023-01-16 RX ORDER — GLIPIZIDE 5 MG/1
TABLET ORAL
Qty: 180 TABLET | Refills: 3 | OUTPATIENT
Start: 2023-01-16

## 2023-01-16 RX ORDER — CANAGLIFLOZIN 100 MG/1
100 TABLET, FILM COATED ORAL DAILY
Qty: 30 TABLET | Refills: 0
Start: 2023-01-16

## 2023-01-16 NOTE — PROGRESS NOTES
"Chief Complaint    Diabetes (Follow-up, wants to discuss her Trulicity )    Subjective      Fiona Portillo presents to [facility]    History of Present Illness    1.) NIDDM : Most recent A1c measured 8.50% - 3 months ago.  Patient has recently been switched to Trulicity secondary to non-coverage of Ozempic.  She has not started the medication.  No complaints regarding her current remaining diabetic medications.    2.) HYPOTHYROID : Most recent TSH measured approximately 3 years ago.  Patient presents today reporting fatigue.  No complaints regarding current dose of levothyroxine.    Objective     Vital Signs:     /68 (BP Location: Left arm, Patient Position: Sitting, Cuff Size: Adult)   Pulse 102   Temp 97.5 °F (36.4 °C) (Temporal)   Ht 152.4 cm (60\")   Wt 78.7 kg (173 lb 9.6 oz)   SpO2 97%   BMI 33.90 kg/m²       Physical Exam  Vitals reviewed.   Constitutional:       General: She is not in acute distress.     Appearance: Normal appearance. She is well-developed.   HENT:      Head: Normocephalic and atraumatic.      Right Ear: Hearing and external ear normal.      Left Ear: Hearing and external ear normal.      Nose: Nose normal.   Eyes:      General: Lids are normal.         Right eye: No discharge.         Left eye: No discharge.      Conjunctiva/sclera: Conjunctivae normal.   Cardiovascular:      Pulses:           Dorsalis pedis pulses are 2+ on the right side and 2+ on the left side.   Pulmonary:      Effort: Pulmonary effort is normal.   Abdominal:      General: There is no distension.   Musculoskeletal:         General: No swelling.      Cervical back: Neck supple.   Feet:      Right foot:      Protective Sensation: 8 sites tested. 8 sites sensed.      Skin integrity: Skin integrity normal. No ulcer or blister.      Toenail Condition: Right toenails are normal.      Left foot:      Protective Sensation: 8 sites tested. 8 sites sensed.      Skin integrity: Skin " integrity normal. No ulcer or blister.      Toenail Condition: Left toenails are normal.      Comments:      Skin:     Coloration: Skin is not jaundiced.      Findings: No erythema.   Neurological:      Mental Status: She is alert. Mental status is at baseline.   Psychiatric:         Mood and Affect: Mood and affect normal.         Thought Content: Thought content normal.     Assessment and Plan     Diagnoses and all orders for this visit:    1. Type 2 diabetes mellitus without complication, without long-term current use of insulin (HCC) (Primary)  Comments:  Patient will start Trulicity.  Hold Januvia.  Continue Invokana plus glipizide.  Hold off regarding diabetic labs.  Return in 1 month for diabetes labs.  Orders:  -     Cancel: CBC & Differential  -     Cancel: Basic metabolic panel  -     Cancel: Hemoglobin A1c  -     Cancel: Lipid Panel  -     Cancel: Microalbumin / Creatinine Urine Ratio - Urine, Clean Catch  -     Basic metabolic panel; Future  -     CBC & Differential; Future  -     Hemoglobin A1c; Future  -     Lipid Panel; Future  -     Microalbumin / Creatinine Urine Ratio - Urine, Clean Catch; Future    2. Hypothyroidism, unspecified type  Comments:  TSH as noted.  Continue levothyroxine at current dose.  Additional recommendations per review.  Orders:  -     TSH    Other orders  -     SITagliptin (Januvia) 100 MG tablet; Take 1 tablet by mouth Daily for 30 days.  Dispense: 30 tablet; Refill: 0  -     Canagliflozin (Invokana) 100 MG tablet tablet; Take 1 tablet by mouth Daily.  Dispense: 30 tablet; Refill: 0      Follow Up     Return in about 3 months (around 4/16/2023).    Patient was given instructions and counseling regarding her condition or for health maintenance advice. Please see specific information pulled into the AVS if appropriate.

## 2023-01-30 RX ORDER — GLIPIZIDE 5 MG/1
10 TABLET ORAL DAILY
Qty: 180 TABLET | Refills: 1 | Status: SHIPPED | OUTPATIENT
Start: 2023-01-30 | End: 2023-04-30

## 2023-02-24 ENCOUNTER — CLINICAL SUPPORT (OUTPATIENT)
Dept: FAMILY MEDICINE CLINIC | Facility: CLINIC | Age: 58
End: 2023-02-24
Payer: OTHER GOVERNMENT

## 2023-02-24 DIAGNOSIS — E11.9 TYPE 2 DIABETES MELLITUS WITHOUT COMPLICATION, WITHOUT LONG-TERM CURRENT USE OF INSULIN: ICD-10-CM

## 2023-02-24 LAB
ANION GAP SERPL CALCULATED.3IONS-SCNC: 9 MMOL/L (ref 5–15)
BASOPHILS # BLD AUTO: 0.04 10*3/MM3 (ref 0–0.2)
BASOPHILS NFR BLD AUTO: 0.5 % (ref 0–1.5)
BUN SERPL-MCNC: 14 MG/DL (ref 6–20)
BUN/CREAT SERPL: 10 (ref 7–25)
CALCIUM SPEC-SCNC: 9.2 MG/DL (ref 8.6–10.5)
CHLORIDE SERPL-SCNC: 106 MMOL/L (ref 98–107)
CHOLEST SERPL-MCNC: 168 MG/DL (ref 0–200)
CO2 SERPL-SCNC: 26 MMOL/L (ref 22–29)
CREAT SERPL-MCNC: 1.4 MG/DL (ref 0.57–1)
DEPRECATED RDW RBC AUTO: 44.5 FL (ref 37–54)
EGFRCR SERPLBLD CKD-EPI 2021: 44.2 ML/MIN/1.73
EOSINOPHIL # BLD AUTO: 0.42 10*3/MM3 (ref 0–0.4)
EOSINOPHIL NFR BLD AUTO: 4.9 % (ref 0.3–6.2)
ERYTHROCYTE [DISTWIDTH] IN BLOOD BY AUTOMATED COUNT: 14.2 % (ref 12.3–15.4)
GLUCOSE SERPL-MCNC: 161 MG/DL (ref 65–99)
HBA1C MFR BLD: 7.3 % (ref 4.8–5.6)
HCT VFR BLD AUTO: 41.8 % (ref 34–46.6)
HDLC SERPL-MCNC: 59 MG/DL (ref 40–60)
HGB BLD-MCNC: 13.8 G/DL (ref 12–15.9)
IMM GRANULOCYTES # BLD AUTO: 0.05 10*3/MM3 (ref 0–0.05)
IMM GRANULOCYTES NFR BLD AUTO: 0.6 % (ref 0–0.5)
LDLC SERPL CALC-MCNC: 89 MG/DL (ref 0–100)
LDLC/HDLC SERPL: 1.47 {RATIO}
LYMPHOCYTES # BLD AUTO: 1.94 10*3/MM3 (ref 0.7–3.1)
LYMPHOCYTES NFR BLD AUTO: 22.7 % (ref 19.6–45.3)
MCH RBC QN AUTO: 28.5 PG (ref 26.6–33)
MCHC RBC AUTO-ENTMCNC: 33 G/DL (ref 31.5–35.7)
MCV RBC AUTO: 86.4 FL (ref 79–97)
MONOCYTES # BLD AUTO: 0.75 10*3/MM3 (ref 0.1–0.9)
MONOCYTES NFR BLD AUTO: 8.8 % (ref 5–12)
NEUTROPHILS NFR BLD AUTO: 5.34 10*3/MM3 (ref 1.7–7)
NEUTROPHILS NFR BLD AUTO: 62.5 % (ref 42.7–76)
NRBC BLD AUTO-RTO: 0 /100 WBC (ref 0–0.2)
PLATELET # BLD AUTO: 226 10*3/MM3 (ref 140–450)
PMV BLD AUTO: 11.3 FL (ref 6–12)
POTASSIUM SERPL-SCNC: 4.2 MMOL/L (ref 3.5–5.2)
RBC # BLD AUTO: 4.84 10*6/MM3 (ref 3.77–5.28)
SODIUM SERPL-SCNC: 141 MMOL/L (ref 136–145)
TRIGL SERPL-MCNC: 111 MG/DL (ref 0–150)
VLDLC SERPL-MCNC: 20 MG/DL (ref 5–40)
WBC NRBC COR # BLD: 8.54 10*3/MM3 (ref 3.4–10.8)

## 2023-02-24 PROCEDURE — 83036 HEMOGLOBIN GLYCOSYLATED A1C: CPT | Performed by: FAMILY MEDICINE

## 2023-02-24 PROCEDURE — 80048 BASIC METABOLIC PNL TOTAL CA: CPT | Performed by: FAMILY MEDICINE

## 2023-02-24 PROCEDURE — 85025 COMPLETE CBC W/AUTO DIFF WBC: CPT | Performed by: FAMILY MEDICINE

## 2023-02-24 PROCEDURE — 80061 LIPID PANEL: CPT | Performed by: FAMILY MEDICINE

## 2023-02-24 PROCEDURE — 36415 COLL VENOUS BLD VENIPUNCTURE: CPT | Performed by: FAMILY MEDICINE

## 2023-02-24 NOTE — PROGRESS NOTES
Venipuncture Blood Specimen Collection  Venipuncture performed in left arm by Karley Yadav with good hemostasis. Patient tolerated the procedure well without complications.   02/24/23   Karley Yadav

## 2023-03-13 RX ORDER — DULAGLUTIDE 0.75 MG/.5ML
INJECTION, SOLUTION SUBCUTANEOUS
Qty: 6 ML | Refills: 3 | Status: SHIPPED | OUTPATIENT
Start: 2023-03-13

## 2023-03-14 RX ORDER — BUPROPION HYDROCHLORIDE 150 MG/1
TABLET ORAL
Qty: 90 TABLET | Refills: 3 | Status: SHIPPED | OUTPATIENT
Start: 2023-03-14

## 2023-03-22 ENCOUNTER — HOSPITAL ENCOUNTER (OUTPATIENT)
Dept: ONCOLOGY | Facility: HOSPITAL | Age: 58
Discharge: HOME | End: 2023-03-22
Admitting: INTERNAL MEDICINE
Payer: OTHER GOVERNMENT

## 2023-03-22 ENCOUNTER — LAB (OUTPATIENT)
Dept: ONCOLOGY | Facility: HOSPITAL | Age: 58
End: 2023-03-22
Payer: OTHER GOVERNMENT

## 2023-03-22 ENCOUNTER — OFFICE VISIT (OUTPATIENT)
Dept: ONCOLOGY | Facility: HOSPITAL | Age: 58
End: 2023-03-22
Payer: OTHER GOVERNMENT

## 2023-03-22 VITALS
BODY MASS INDEX: 34.01 KG/M2 | SYSTOLIC BLOOD PRESSURE: 116 MMHG | OXYGEN SATURATION: 98 % | DIASTOLIC BLOOD PRESSURE: 73 MMHG | HEART RATE: 90 BPM | TEMPERATURE: 97.7 F | WEIGHT: 174.16 LBS | RESPIRATION RATE: 16 BRPM

## 2023-03-22 DIAGNOSIS — Z17.0 MALIGNANT NEOPLASM OF UPPER-OUTER QUADRANT OF RIGHT BREAST IN FEMALE, ESTROGEN RECEPTOR POSITIVE: Primary | ICD-10-CM

## 2023-03-22 DIAGNOSIS — M85.88 OSTEOPENIA OF LUMBAR SPINE: ICD-10-CM

## 2023-03-22 DIAGNOSIS — M85.88 OSTEOPENIA OF LUMBAR SPINE: Primary | ICD-10-CM

## 2023-03-22 DIAGNOSIS — C50.411 MALIGNANT NEOPLASM OF UPPER-OUTER QUADRANT OF RIGHT BREAST IN FEMALE, ESTROGEN RECEPTOR POSITIVE: Primary | ICD-10-CM

## 2023-03-22 DIAGNOSIS — Z12.31 SCREENING MAMMOGRAM FOR BREAST CANCER: ICD-10-CM

## 2023-03-22 LAB
ALBUMIN SERPL-MCNC: 4.6 G/DL (ref 3.5–5.2)
ALBUMIN/GLOB SERPL: 1.6 G/DL
ALP SERPL-CCNC: 71 U/L (ref 39–117)
ALT SERPL W P-5'-P-CCNC: 32 U/L (ref 1–33)
ANION GAP SERPL CALCULATED.3IONS-SCNC: 11.6 MMOL/L (ref 5–15)
AST SERPL-CCNC: 23 U/L (ref 1–32)
BASOPHILS # BLD AUTO: 0.03 10*3/MM3 (ref 0–0.2)
BASOPHILS NFR BLD AUTO: 0.4 % (ref 0–1.5)
BILIRUB SERPL-MCNC: 0.4 MG/DL (ref 0–1.2)
BUN SERPL-MCNC: 18 MG/DL (ref 6–20)
BUN/CREAT SERPL: 12.9 (ref 7–25)
CALCIUM SPEC-SCNC: 10.9 MG/DL (ref 8.6–10.5)
CHLORIDE SERPL-SCNC: 100 MMOL/L (ref 98–107)
CO2 SERPL-SCNC: 27.4 MMOL/L (ref 22–29)
CREAT SERPL-MCNC: 1.4 MG/DL (ref 0.57–1)
DEPRECATED RDW RBC AUTO: 45.9 FL (ref 37–54)
EGFRCR SERPLBLD CKD-EPI 2021: 44.2 ML/MIN/1.73
EOSINOPHIL # BLD AUTO: 0.35 10*3/MM3 (ref 0–0.4)
EOSINOPHIL NFR BLD AUTO: 4.4 % (ref 0.3–6.2)
ERYTHROCYTE [DISTWIDTH] IN BLOOD BY AUTOMATED COUNT: 14.2 % (ref 12.3–15.4)
GLOBULIN UR ELPH-MCNC: 2.8 GM/DL
GLUCOSE SERPL-MCNC: 172 MG/DL (ref 65–99)
HCT VFR BLD AUTO: 44.2 % (ref 34–46.6)
HGB BLD-MCNC: 14.5 G/DL (ref 12–15.9)
IMM GRANULOCYTES # BLD AUTO: 0.02 10*3/MM3 (ref 0–0.05)
IMM GRANULOCYTES NFR BLD AUTO: 0.3 % (ref 0–0.5)
LYMPHOCYTES # BLD AUTO: 2.17 10*3/MM3 (ref 0.7–3.1)
LYMPHOCYTES NFR BLD AUTO: 27.1 % (ref 19.6–45.3)
MAGNESIUM SERPL-MCNC: 2 MG/DL (ref 1.6–2.6)
MCH RBC QN AUTO: 28.4 PG (ref 26.6–33)
MCHC RBC AUTO-ENTMCNC: 32.8 G/DL (ref 31.5–35.7)
MCV RBC AUTO: 86.5 FL (ref 79–97)
MONOCYTES # BLD AUTO: 0.78 10*3/MM3 (ref 0.1–0.9)
MONOCYTES NFR BLD AUTO: 9.8 % (ref 5–12)
NEUTROPHILS NFR BLD AUTO: 4.65 10*3/MM3 (ref 1.7–7)
NEUTROPHILS NFR BLD AUTO: 58 % (ref 42.7–76)
PHOSPHATE SERPL-MCNC: 4.7 MG/DL (ref 2.5–4.5)
PLATELET # BLD AUTO: 258 10*3/MM3 (ref 140–450)
PMV BLD AUTO: 10 FL (ref 6–12)
POTASSIUM SERPL-SCNC: 3.8 MMOL/L (ref 3.5–5.2)
PROT SERPL-MCNC: 7.4 G/DL (ref 6–8.5)
RBC # BLD AUTO: 5.11 10*6/MM3 (ref 3.77–5.28)
SODIUM SERPL-SCNC: 139 MMOL/L (ref 136–145)
WBC NRBC COR # BLD: 8 10*3/MM3 (ref 3.4–10.8)

## 2023-03-22 PROCEDURE — 96372 THER/PROPH/DIAG INJ SC/IM: CPT

## 2023-03-22 PROCEDURE — 36415 COLL VENOUS BLD VENIPUNCTURE: CPT

## 2023-03-22 PROCEDURE — 85025 COMPLETE CBC W/AUTO DIFF WBC: CPT

## 2023-03-22 PROCEDURE — 99214 OFFICE O/P EST MOD 30 MIN: CPT | Performed by: INTERNAL MEDICINE

## 2023-03-22 PROCEDURE — G0463 HOSPITAL OUTPT CLINIC VISIT: HCPCS

## 2023-03-22 PROCEDURE — 84100 ASSAY OF PHOSPHORUS: CPT

## 2023-03-22 PROCEDURE — 25010000002 DENOSUMAB 60 MG/ML SOLUTION PREFILLED SYRINGE: Performed by: INTERNAL MEDICINE

## 2023-03-22 PROCEDURE — 80053 COMPREHEN METABOLIC PANEL: CPT

## 2023-03-22 PROCEDURE — 83735 ASSAY OF MAGNESIUM: CPT

## 2023-03-22 RX ADMIN — DENOSUMAB 60 MG: 60 INJECTION SUBCUTANEOUS at 10:26

## 2023-03-22 NOTE — ASSESSMENT & PLAN NOTE
Status post treatments as outlined.  I see no evidence of disease recurrence per history or physical examination.  She will be due for mammogram in July.  Order provided.

## 2023-03-22 NOTE — ASSESSMENT & PLAN NOTE
Patient is receiving Prolia every 6 months.  Tolerating the injections well.  Electrolytes look good.  No dental or jaw pain.  Most recent DEXA scan showed improvement in bone density but persistent osteopenia.  Continue Prolia with injection today.  Continue calcium and vitamin D daily.  Exercise routinely.  RTC 6 months for OV, Prolia with lab work prior to monitor for toxicities.

## 2023-03-22 NOTE — PROGRESS NOTES
Chief Complaint  Breast Cancer    Jesica Alejandra, DO    Subjective          Fiona Portillo presents to [facility] for ongoing treatment of her osteopenia and follow-up of breast cancer.  She is status post treatments as outlined.  She completed 7 years of anastrozole therapy last year.  She denies any masses or adenopathy, no unusual aches or pains.  She is exercising routinely.  She is taking Prolia for bone health.  She denies any issues from the injection.  No mouth sores or dental issues.    Oncology/Hematology History Overview Note   Right breast invasive ductal carcinoma ER/WY+  HER2+            Clinical Staging      Stage IA ([id number])            Treatments      Chemotherapy      12/30/15 thru 4/20/16 completed 6C TCHP--completed 1yr Herceptin      Arimidex   began. 6/25/16    completed 2022     3/14/18 Prolia initiated        Radiation Therapy      5/10/16 thru 6/24/16 completed 5700cGy right breast        Surgeries      11/23/2015 right sided lumpectomy and sentinel lymph node biopsy.         Malignant neoplasm of upper-outer quadrant of right breast in female, estrogen receptor positive (HCC)   7/1/2021 Initial Diagnosis    Breast CA (CMS/HCC)     7/6/2021 Cancer Staged    Staging form: Breast, AJCC 8th Edition  - Clinical: cT1c, cN0, cM0, ER+, WY+, HER2+ - Signed by Champ Estrada MD on 7/6/2021         Review of Systems   Constitutional: Negative for appetite change, diaphoresis, fatigue, fever, unexpected weight gain and unexpected weight loss.   HENT: Negative for hearing loss, mouth sores, sore throat, swollen glands, trouble swallowing and voice change.    Eyes: Negative for blurred vision.   Respiratory: Negative for cough, shortness of breath and wheezing.    Cardiovascular: Negative for chest pain and palpitations.   Gastrointestinal: Negative for abdominal pain, blood in stool, constipation, diarrhea, nausea and vomiting.   Endocrine: Negative for cold  intolerance and heat intolerance.   Genitourinary: Negative for difficulty urinating, dysuria, frequency, hematuria and urinary incontinence.   Musculoskeletal: Negative for arthralgias, back pain and myalgias.   Skin: Negative for rash, skin lesions and wound.   Neurological: Negative for dizziness, seizures, weakness, numbness and headache.   Hematological: Does not bruise/bleed easily.   Psychiatric/Behavioral: Negative for depressed mood. The patient is not nervous/anxious.      Current Outpatient Medications on File Prior to Visit   Medication Sig Dispense Refill   • buPROPion XL (WELLBUTRIN XL) 150 MG 24 hr tablet TAKE 1 TABLET EVERY MORNING 90 tablet 3   • Canagliflozin (Invokana) 100 MG tablet tablet Take 1 tablet by mouth Daily. 30 tablet 0   • fexofenadine (ALLEGRA) 180 MG tablet Take 1 tablet by mouth Daily. 90 tablet 1   • glipizide (GLUCOTROL) 5 MG tablet Take 2 tablets by mouth Daily for 90 days. 180 tablet 1   • glucose blood (FREESTYLE LITE) test strip USE AS INSTRUCTED 100 each 4   • glucose monitoring kit (FREESTYLE) monitoring kit 1 each Daily. 1 each 0   • Lancets (freestyle) lancets 1 each by Other route Daily. Use as instructed 200 each 1   • levothyroxine (SYNTHROID, LEVOTHROID) 125 MCG tablet TAKE 1 TABLET DAILY 30 MINUTES BEFORE BREAKFAST ON AN EMPTY STOMACH 90 tablet 3   • rosuvastatin (CRESTOR) 40 MG tablet TAKE 1 TABLET DAILY 90 tablet 3   • telmisartan (MICARDIS) 40 MG tablet TAKE 1 TABLET DAILY 90 tablet 3   • Trulicity 0.75 MG/0.5ML solution pen-injector INJECT 0.75 MG UNDER THE SKIN INTO THE APPROPRIATE AREA ONCE A WEEK AS DIRECTED 6 mL 3   • SITagliptin (Januvia) 100 MG tablet Take 1 tablet by mouth Daily for 30 days. 30 tablet 0   • venlafaxine XR (EFFEXOR-XR) 75 MG 24 hr capsule Take 1 capsule by mouth Daily for 90 days. 90 capsule 3     No current facility-administered medications on file prior to visit.       Allergies   Allergen Reactions   • Lisinopril Cough and Unknown - Low  Severity     Past Medical History:   Diagnosis Date   • Depression    • Essential (primary) hypertension 2/19/2018   • High cholesterol    • Hx of radiation therapy    • Malignant neoplasm of upper-outer quadrant of right breast in female, estrogen receptor positive (HCC) 07/01/2021   • Osteopenia of lumbar spine 07/06/2021     Past Surgical History:   Procedure Laterality Date   • BREAST LUMPECTOMY      right   • LEFT VENTRICULAR ASSIST DEVICE     • OTHER SURGICAL HISTORY      BIOPSY     Social History     Socioeconomic History   • Marital status:    • Number of children: 2   Tobacco Use   • Smoking status: Never   • Smokeless tobacco: Never   Vaping Use   • Vaping Use: Never used   Substance and Sexual Activity   • Alcohol use: Never   • Drug use: Never   • Sexual activity: Defer     Family History   Problem Relation Age of Onset   • Breast cancer Other    • Breast cancer Mother        Objective   Physical Exam  Vitals reviewed. Exam conducted with a chaperone present.   Constitutional:       General: She is not in acute distress.     Appearance: Normal appearance.   Cardiovascular:      Rate and Rhythm: Normal rate and regular rhythm.      Heart sounds: Normal heart sounds. No murmur heard.    No gallop.   Pulmonary:      Effort: Pulmonary effort is normal.      Breath sounds: Normal breath sounds.   Chest:   Breasts:     Right: Skin change (See diagram) present.      Left: Normal.       Abdominal:      General: Abdomen is flat. Bowel sounds are normal.      Palpations: Abdomen is soft.   Musculoskeletal:      Cervical back: Neck supple.      Right lower leg: No edema.      Left lower leg: No edema.   Lymphadenopathy:      Cervical: No cervical adenopathy.      Upper Body:      Right upper body: No supraclavicular or axillary adenopathy.      Left upper body: No supraclavicular or axillary adenopathy.   Neurological:      Mental Status: She is alert and oriented to person, place, and time.   Psychiatric:          Mood and Affect: Mood normal.         Behavior: Behavior normal.         Vitals:    03/22/23 0935   BP: 116/73   Pulse: 90   Resp: 16   Temp: 97.7 °F (36.5 °C)   TempSrc: Temporal   SpO2: 98%   Weight: 79 kg (174 lb 2.6 oz)   PainSc: 0-No pain     ECOG score: 0         PHQ-9 Total Score:                    Result Review :   The following data was reviewed by: Champ Estrada MD on 03/22/2023:  Lab Results   Component Value Date    HGB 14.5 03/22/2023    HCT 44.2 03/22/2023    MCV 86.5 03/22/2023     03/22/2023    WBC 8.00 03/22/2023    NEUTROABS 4.65 03/22/2023    LYMPHSABS 2.17 03/22/2023    MONOSABS 0.78 03/22/2023    EOSABS 0.35 03/22/2023    BASOSABS 0.03 03/22/2023     Lab Results   Component Value Date    GLUCOSE 172 (H) 03/22/2023    BUN 18 03/22/2023    CREATININE 1.40 (H) 03/22/2023     03/22/2023    K 3.8 03/22/2023     03/22/2023    CO2 27.4 03/22/2023    CALCIUM 10.9 (H) 03/22/2023    PROTEINTOT 7.4 03/22/2023    ALBUMIN 4.6 03/22/2023    BILITOT 0.4 03/22/2023    ALKPHOS 71 03/22/2023    AST 23 03/22/2023    ALT 32 03/22/2023     Lab Results   Component Value Date    MG 2.0 03/22/2023    PHOS 3.9 09/21/2022    FREET4 1.8 03/02/2021    TSH 3.120 01/16/2023     No results found for: IRON, LABIRON, TRANSFERRIN, TIBC  No results found for: LDH, FERRITIN, [id number], FOLATE  No results found for: PSA, CEA, AFP, ,           Assessment and Plan    Diagnoses and all orders for this visit:    1. Malignant neoplasm of upper-outer quadrant of right breast in female, estrogen receptor positive (HCC) (Primary)  Assessment & Plan:  Status post treatments as outlined.  I see no evidence of disease recurrence per history or physical examination.  She will be due for mammogram in July.  Order provided.      2. Screening mammogram for breast cancer  -     Mammo Screening Digital Tomosynthesis Bilateral With CAD; Future    3. Osteopenia of lumbar spine  Assessment & Plan:  Patient is  receiving Prolia every 6 months.  Tolerating the injections well.  Electrolytes look good.  No dental or jaw pain.  Most recent DEXA scan showed improvement in bone density but persistent osteopenia.  Continue Prolia with injection today.  Continue calcium and vitamin D daily.  Exercise routinely.  RTC 6 months for OV, Prolia with lab work prior to monitor for toxicities.    Orders:  -     Cancel: denosumab (PROLIA) syringe 60 mg          Patient Follow Up: 6 months    Patient was given instructions and counseling regarding her condition or for health maintenance advice. Please see specific information pulled into the AVS if appropriate.     Champ Estrada MD    3/22/2023

## 2023-07-25 ENCOUNTER — HOSPITAL ENCOUNTER (OUTPATIENT)
Dept: MAMMOGRAPHY | Facility: HOSPITAL | Age: 58
Discharge: HOME | End: 2023-07-25
Admitting: INTERNAL MEDICINE
Payer: OTHER GOVERNMENT

## 2023-07-25 DIAGNOSIS — Z12.31 SCREENING MAMMOGRAM FOR BREAST CANCER: ICD-10-CM

## 2023-07-25 PROCEDURE — 77063 BREAST TOMOSYNTHESIS BI: CPT

## 2023-07-25 PROCEDURE — 77067 SCR MAMMO BI INCL CAD: CPT

## 2023-07-31 RX ORDER — GLIPIZIDE 5 MG/1
TABLET ORAL
Qty: 180 TABLET | Refills: 3 | Status: SHIPPED | OUTPATIENT
Start: 2023-07-31

## 2023-09-11 RX ORDER — TELMISARTAN 40 MG/1
TABLET ORAL
Qty: 90 TABLET | Refills: 3 | Status: SHIPPED | OUTPATIENT
Start: 2023-09-11

## 2023-09-11 RX ORDER — LANCETS 28 GAUGE
EACH MISCELLANEOUS
Qty: 200 EACH | Refills: 3 | Status: SHIPPED | OUTPATIENT
Start: 2023-09-11

## 2023-09-29 ENCOUNTER — OFFICE VISIT (OUTPATIENT)
Dept: FAMILY MEDICINE CLINIC | Facility: CLINIC | Age: 58
End: 2023-09-29
Payer: OTHER GOVERNMENT

## 2023-09-29 VITALS — BODY MASS INDEX: 34.01 KG/M2 | HEIGHT: 60 IN

## 2023-09-29 DIAGNOSIS — Z85.3 HISTORY OF BREAST CANCER: ICD-10-CM

## 2023-09-29 DIAGNOSIS — B34.9 VIRAL ILLNESS: Primary | ICD-10-CM

## 2023-09-29 DIAGNOSIS — Z17.0 MALIGNANT NEOPLASM OF UPPER-OUTER QUADRANT OF RIGHT BREAST IN FEMALE, ESTROGEN RECEPTOR POSITIVE: Primary | ICD-10-CM

## 2023-09-29 DIAGNOSIS — Z20.822 CLOSE EXPOSURE TO COVID-19 VIRUS: ICD-10-CM

## 2023-09-29 DIAGNOSIS — C50.411 MALIGNANT NEOPLASM OF UPPER-OUTER QUADRANT OF RIGHT BREAST IN FEMALE, ESTROGEN RECEPTOR POSITIVE: Primary | ICD-10-CM

## 2023-09-29 LAB — SARS-COV-2 RNA RESP QL NAA+PROBE: NOT DETECTED

## 2023-09-29 PROCEDURE — 87635 SARS-COV-2 COVID-19 AMP PRB: CPT | Performed by: FAMILY MEDICINE

## 2023-09-29 PROCEDURE — 99213 OFFICE O/P EST LOW 20 MIN: CPT | Performed by: FAMILY MEDICINE

## 2023-09-29 NOTE — PROGRESS NOTES
"Chief Complaint    Exposure To Known Illness (Exposed to COVID 09/25/23.  Started feeling bad yesterday.  Nasal drainage, nausea/diarrhea )    Subjective      Fiona Portillo presents to [facility]    History of Present Illness    1.) ACUTE ILLNESS: Onset - > 24 hours. Pt presents with left-sided nasal drainage. She also reports nausea and diarrhea. No fevers or chills. No loss of taste or smell. Recent exposure to COV 19.    Objective     Vital Signs:     Ht 152.4 cm (60\")   BMI 34.01 kg/m²       Physical Exam  Vitals reviewed.   Constitutional:       General: She is not in acute distress.     Appearance: Normal appearance. She is well-developed.   HENT:      Head: Normocephalic and atraumatic.      Right Ear: Hearing and external ear normal. Tympanic membrane is not injected or bulging.      Left Ear: Hearing and external ear normal. Tympanic membrane is not injected or bulging.      Nose: Nose normal. No rhinorrhea.      Mouth/Throat:      Pharynx: No oropharyngeal exudate.   Eyes:      General: Lids are normal.         Right eye: No discharge.         Left eye: No discharge.      Conjunctiva/sclera: Conjunctivae normal.   Pulmonary:      Effort: Pulmonary effort is normal.      Breath sounds: Normal breath sounds.   Abdominal:      General: There is no distension.   Musculoskeletal:         General: No swelling.      Cervical back: Neck supple.   Skin:     Coloration: Skin is not jaundiced.      Findings: No erythema.   Neurological:      Mental Status: She is alert. Mental status is at baseline.   Psychiatric:         Mood and Affect: Mood and affect normal.         Thought Content: Thought content normal.     Assessment and Plan     Diagnoses and all orders for this visit:    1. Viral illness (Primary)  Comments:  See above.    2. Close exposure to COVID-19 virus  Comments:  COV testing here in office as noted. Precautions discussed, while awaiting COV testing. Adequate " fluids and rest. Additional recs per review.  Orders:  -     COVID-19,CEPHEID/NICOLE,COR/MARQUISE/PAD/SUJEY/MAD IN-HOUSE(OR EMERGENT/ADD-ON),NP SWAB IN TRANSPORT MEDIA 3-4 HR TAT, RT-PCR - Swab, Nasopharynx; Future    Follow Up : PRN    Patient was given instructions and counseling regarding her condition or for health maintenance advice. Please see specific information pulled into the AVS if appropriate.

## 2023-10-10 RX ORDER — VENLAFAXINE HYDROCHLORIDE 75 MG/1
75 CAPSULE, EXTENDED RELEASE ORAL DAILY
Qty: 90 CAPSULE | Refills: 3 | Status: SHIPPED | OUTPATIENT
Start: 2023-10-10

## 2023-11-27 ENCOUNTER — TELEPHONE (OUTPATIENT)
Dept: FAMILY MEDICINE CLINIC | Facility: CLINIC | Age: 58
End: 2023-11-27

## 2023-11-27 NOTE — TELEPHONE ENCOUNTER
Caller: [facility] - [place], [place] - [address] [place] [address] - [phone] [facility] [phone] [facility]    Relationship: Pharmacy    Best call back number:[phone]    Requested Prescriptions:     Canagliflozin (Invokana) 100 MG tablet tablet   Take 1 tablet by mouth Daily        Pharmacy where request should be sent:  [facility] - [place], [place] - [address] [place] [address] - [phone] [facility] [phone]     Last office visit with prescribing clinician: 9/29/2023   Last telemedicine visit with prescribing clinician: Visit date not found   Next office visit with prescribing clinician: Visit date not found     Additional details provided by patient: PHARMACY CALLED AND REQUESTED REFILL. REFERENCE # [id number]    Does the patient have less than a 3 day supply:  [] Yes  [] No    Would you like a call back once the refill request has been completed: [] Yes [] No    If the office needs to give you a call back, can they leave a voicemail: [] Yes [] No    Chavo Mathias Rep   11/27/23 12:36 EST

## 2023-11-28 ENCOUNTER — TELEPHONE (OUTPATIENT)
Dept: ONCOLOGY | Facility: HOSPITAL | Age: 58
End: 2023-11-28
Payer: OTHER GOVERNMENT

## 2023-11-28 RX ORDER — CANAGLIFLOZIN 100 MG/1
100 TABLET, FILM COATED ORAL DAILY
Qty: 90 TABLET | Refills: 1 | Status: SHIPPED | OUTPATIENT
Start: 2023-11-28

## 2023-11-28 RX ORDER — CANAGLIFLOZIN 100 MG/1
100 TABLET, FILM COATED ORAL DAILY
Qty: 90 TABLET | Refills: 1 | OUTPATIENT
Start: 2023-11-28

## 2023-11-28 NOTE — TELEPHONE ENCOUNTER
"    Caller: Fiona Portillo \"Xiomara\"    Relationship to patient: Self    Best call back number: [phone]     Chief complaint: R/S MISSED APPTS    Type of visit: LAB, FOLLOW UP AND PROLIA INJECTION    Requested date: CALL PT TO R/S SOONEST, THEN WILL NEED NEXT APPT MOVED OUT FURTHER AS WELL PROBABLY.  SHE HAD TROUBLE WITH INSURANCE BUT IT IS STRAIGHTENED OUT NOW AND READY TO RESCHEDULE THESE.    If rescheduling, when is the original appointment: SEPTEMBER 2023                "

## 2023-12-06 ENCOUNTER — LAB (OUTPATIENT)
Dept: ONCOLOGY | Facility: HOSPITAL | Age: 58
End: 2023-12-06
Payer: OTHER GOVERNMENT

## 2023-12-06 ENCOUNTER — HOSPITAL ENCOUNTER (OUTPATIENT)
Dept: ONCOLOGY | Facility: HOSPITAL | Age: 58
Discharge: HOME | End: 2023-12-06
Admitting: NURSE PRACTITIONER
Payer: OTHER GOVERNMENT

## 2023-12-06 ENCOUNTER — OFFICE VISIT (OUTPATIENT)
Dept: ONCOLOGY | Facility: HOSPITAL | Age: 58
End: 2023-12-06
Payer: OTHER GOVERNMENT

## 2023-12-06 VITALS
HEART RATE: 88 BPM | DIASTOLIC BLOOD PRESSURE: 68 MMHG | TEMPERATURE: 97.4 F | WEIGHT: 178.35 LBS | HEIGHT: 60 IN | RESPIRATION RATE: 16 BRPM | SYSTOLIC BLOOD PRESSURE: 110 MMHG | BODY MASS INDEX: 35.02 KG/M2 | OXYGEN SATURATION: 99 %

## 2023-12-06 DIAGNOSIS — Z17.0 MALIGNANT NEOPLASM OF UPPER-OUTER QUADRANT OF RIGHT BREAST IN FEMALE, ESTROGEN RECEPTOR POSITIVE: Primary | ICD-10-CM

## 2023-12-06 DIAGNOSIS — M85.88 OSTEOPENIA OF LUMBAR SPINE: ICD-10-CM

## 2023-12-06 DIAGNOSIS — M85.88 OSTEOPENIA OF LUMBAR SPINE: Primary | ICD-10-CM

## 2023-12-06 DIAGNOSIS — C50.411 MALIGNANT NEOPLASM OF UPPER-OUTER QUADRANT OF RIGHT BREAST IN FEMALE, ESTROGEN RECEPTOR POSITIVE: Primary | ICD-10-CM

## 2023-12-06 LAB
ALBUMIN SERPL-MCNC: 4.6 G/DL (ref 3.5–5.2)
ALBUMIN/GLOB SERPL: 1.8 G/DL
ALP SERPL-CCNC: 135 U/L (ref 39–117)
ALT SERPL W P-5'-P-CCNC: 26 U/L (ref 1–33)
ANION GAP SERPL CALCULATED.3IONS-SCNC: 10.5 MMOL/L (ref 5–15)
AST SERPL-CCNC: 17 U/L (ref 1–32)
BASOPHILS # BLD AUTO: 0.02 10*3/MM3 (ref 0–0.2)
BASOPHILS NFR BLD AUTO: 0.2 % (ref 0–1.5)
BILIRUB SERPL-MCNC: 0.4 MG/DL (ref 0–1.2)
BUN SERPL-MCNC: 22 MG/DL (ref 6–20)
BUN/CREAT SERPL: 14.9 (ref 7–25)
CALCIUM SPEC-SCNC: 10.4 MG/DL (ref 8.6–10.5)
CHLORIDE SERPL-SCNC: 98 MMOL/L (ref 98–107)
CO2 SERPL-SCNC: 27.5 MMOL/L (ref 22–29)
CREAT SERPL-MCNC: 1.48 MG/DL (ref 0.57–1)
DEPRECATED RDW RBC AUTO: 42.1 FL (ref 37–54)
EGFRCR SERPLBLD CKD-EPI 2021: 41.1 ML/MIN/1.73
EOSINOPHIL # BLD AUTO: 0.25 10*3/MM3 (ref 0–0.4)
EOSINOPHIL NFR BLD AUTO: 3 % (ref 0.3–6.2)
ERYTHROCYTE [DISTWIDTH] IN BLOOD BY AUTOMATED COUNT: 13.1 % (ref 12.3–15.4)
GLOBULIN UR ELPH-MCNC: 2.5 GM/DL
GLUCOSE SERPL-MCNC: 226 MG/DL (ref 65–99)
HCT VFR BLD AUTO: 43.3 % (ref 34–46.6)
HGB BLD-MCNC: 14.3 G/DL (ref 12–15.9)
IMM GRANULOCYTES # BLD AUTO: 0.04 10*3/MM3 (ref 0–0.05)
IMM GRANULOCYTES NFR BLD AUTO: 0.5 % (ref 0–0.5)
LYMPHOCYTES # BLD AUTO: 2.22 10*3/MM3 (ref 0.7–3.1)
LYMPHOCYTES NFR BLD AUTO: 26.6 % (ref 19.6–45.3)
MAGNESIUM SERPL-MCNC: 2 MG/DL (ref 1.6–2.6)
MCH RBC QN AUTO: 28.8 PG (ref 26.6–33)
MCHC RBC AUTO-ENTMCNC: 33 G/DL (ref 31.5–35.7)
MCV RBC AUTO: 87.3 FL (ref 79–97)
MONOCYTES # BLD AUTO: 0.73 10*3/MM3 (ref 0.1–0.9)
MONOCYTES NFR BLD AUTO: 8.8 % (ref 5–12)
NEUTROPHILS NFR BLD AUTO: 5.08 10*3/MM3 (ref 1.7–7)
NEUTROPHILS NFR BLD AUTO: 60.9 % (ref 42.7–76)
PHOSPHATE SERPL-MCNC: 4 MG/DL (ref 2.5–4.5)
PLATELET # BLD AUTO: 248 10*3/MM3 (ref 140–450)
PMV BLD AUTO: 10.8 FL (ref 6–12)
POTASSIUM SERPL-SCNC: 4 MMOL/L (ref 3.5–5.2)
PROT SERPL-MCNC: 7.1 G/DL (ref 6–8.5)
RBC # BLD AUTO: 4.96 10*6/MM3 (ref 3.77–5.28)
SODIUM SERPL-SCNC: 136 MMOL/L (ref 136–145)
WBC NRBC COR # BLD AUTO: 8.34 10*3/MM3 (ref 3.4–10.8)

## 2023-12-06 PROCEDURE — 80053 COMPREHEN METABOLIC PANEL: CPT

## 2023-12-06 PROCEDURE — 25010000002 DENOSUMAB 60 MG/ML SOLUTION PREFILLED SYRINGE: Performed by: NURSE PRACTITIONER

## 2023-12-06 PROCEDURE — 85025 COMPLETE CBC W/AUTO DIFF WBC: CPT

## 2023-12-06 PROCEDURE — 36415 COLL VENOUS BLD VENIPUNCTURE: CPT

## 2023-12-06 PROCEDURE — 83735 ASSAY OF MAGNESIUM: CPT

## 2023-12-06 PROCEDURE — G0463 HOSPITAL OUTPT CLINIC VISIT: HCPCS | Performed by: NURSE PRACTITIONER

## 2023-12-06 PROCEDURE — 96372 THER/PROPH/DIAG INJ SC/IM: CPT

## 2023-12-06 PROCEDURE — 84100 ASSAY OF PHOSPHORUS: CPT

## 2023-12-06 RX ADMIN — DENOSUMAB 60 MG: 60 INJECTION SUBCUTANEOUS at 10:22

## 2023-12-06 NOTE — PROGRESS NOTES
Chief Complaint/Reason for Referral:  Malignant neoplasm of upper-outer quadrant of right breast     Champ Estrada MD Madut, Nyebol, DO      Subjective    History of Present Illness  Ms. Fiona Portillo presents for 9 month follow up for Prolia injection. Missed her dose in September when it was due to insurance issues with coverage. Reports doing well at this time. Completed 7 years of endocrine therapy for breast cancer. Due for mammogram in July of 2024. Next BMD is due in September of 2024. Denies any persistent headaches, cough, SOA or bone or back pain. Feels more fatigued than usual.         Cancer Staging   Malignant neoplasm of upper-outer quadrant of right breast in female, estrogen receptor positive  Staging form: Breast, AJCC 8th Edition  - Clinical: cT1c, cN0, cM0, ER+, ID+, HER2+ - Signed by Champ Estrada MD on 7/6/2021       Treatment intent: curative    Oncology/Hematology History Overview Note   Right breast invasive ductal carcinoma ER/ID+  HER2+            Clinical Staging      Stage IA ([id number])            Treatments      Chemotherapy      12/30/15 thru 4/20/16 completed 6C T[facility]P--completed 1yr Herceptin      Arimidex   began. 6/25/16    completed 2022     3/14/18 Prolia initiated        Radiation Therapy      5/10/16 thru 6/24/16 completed 5700cGy right breast        Surgeries      11/23/2015 right sided lumpectomy and sentinel lymph node biopsy.         Malignant neoplasm of upper-outer quadrant of right breast in female, estrogen receptor positive   7/1/2021 Initial Diagnosis    Breast CA (CMS/HCC)     7/6/2021 Cancer Staged    Staging form: Breast, AJCC 8th Edition  - Clinical: cT1c, cN0, cM0, ER+, ID+, HER2+ - Signed by Champ Estrada MD on 7/6/2021         Review of Systems   Constitutional:  Positive for fatigue (Blood sugar was 230 this morning). Negative for appetite change, diaphoresis, fever, unexpected weight gain and unexpected weight loss.   HENT:  Negative for hearing  loss, mouth sores, sore throat, swollen glands, trouble swallowing and voice change.    Eyes:  Negative for blurred vision.   Respiratory:  Negative for cough, shortness of breath and wheezing.    Cardiovascular:  Negative for chest pain and palpitations.   Gastrointestinal:  Negative for abdominal pain, blood in stool, constipation, diarrhea, nausea and vomiting.   Endocrine: Negative for cold intolerance and heat intolerance.   Genitourinary:  Negative for difficulty urinating, dysuria, frequency, hematuria and urinary incontinence.   Musculoskeletal:  Negative for arthralgias, back pain and myalgias.   Skin:  Negative for rash, skin lesions and wound.   Neurological:  Negative for dizziness, seizures, weakness, numbness and headache.   Hematological:  Does not bruise/bleed easily.   Psychiatric/Behavioral:  Negative for depressed mood. The patient is not nervous/anxious.    All other systems reviewed and are negative.      Current Outpatient Medications on File Prior to Visit   Medication Sig Dispense Refill    buPROPion XL (WELLBUTRIN XL) 150 MG 24 hr tablet TAKE 1 TABLET EVERY MORNING 90 tablet 3    Canagliflozin (Invokana) 100 MG tablet tablet Take 1 tablet by mouth Daily. 90 tablet 1    fexofenadine (ALLEGRA) 180 MG tablet Take 1 tablet by mouth Daily. 90 tablet 1    glipizide (GLUCOTROL) 5 MG tablet TAKE 2 TABLETS DAILY 180 tablet 3    glucose blood (FREESTYLE LITE) test strip USE AS INSTRUCTED 100 each 4    Lancets (freestyle) lancets USE 1 LANCET DAILY AS INSTRUCTED 200 each 3    levothyroxine (SYNTHROID, LEVOTHROID) 125 MCG tablet TAKE 1 TABLET DAILY 30 MINUTES BEFORE BREAKFAST ON AN EMPTY STOMACH 90 tablet 3    rosuvastatin (CRESTOR) 40 MG tablet TAKE 1 TABLET DAILY 90 tablet 3    telmisartan (MICARDIS) 40 MG tablet TAKE 1 TABLET DAILY 90 tablet 3    Trulicity 0.75 MG/0.5ML solution pen-injector INJECT 0.75 MG UNDER THE SKIN INTO THE APPROPRIATE AREA ONCE A WEEK AS DIRECTED 6 mL 3    venlafaxine XR  (EFFEXOR-XR) 75 MG 24 hr capsule TAKE 1 CAPSULE DAILY 90 capsule 3    SITagliptin (Januvia) 100 MG tablet Take 1 tablet by mouth Daily for 30 days. 30 tablet 0     No current facility-administered medications on file prior to visit.       Allergies   Allergen Reactions    Lisinopril Cough and Unknown - Low Severity     Past Medical History:   Diagnosis Date    Depression     Essential (primary) hypertension 2/19/2018    High cholesterol     Hx of radiation therapy     Malignant neoplasm of upper-outer quadrant of right breast in female, estrogen receptor positive 07/01/2021    Osteopenia of lumbar spine 07/06/2021     Past Surgical History:   Procedure Laterality Date    BREAST LUMPECTOMY      right    LEFT VENTRICULAR ASSIST DEVICE      OTHER SURGICAL HISTORY      BIOPSY     Social History     Socioeconomic History    Marital status:     Number of children: 2   Tobacco Use    Smoking status: Never    Smokeless tobacco: Never   Vaping Use    Vaping Use: Never used   Substance and Sexual Activity    Alcohol use: Never    Drug use: Never    Sexual activity: Defer     Family History   Problem Relation Age of Onset    Breast cancer Other     Breast cancer Mother      Immunization History   Administered Date(s) Administered    COVID-19 ([employer]) 04/09/2021    COVID-19 (PFIZER) Purple Cap Monovalent 01/14/2022    Fluzone (or Fluarix & Flulaval for VFC) >6mos 10/18/2021, 10/17/2022    Influenza, Unspecified 10/05/2016, 12/08/2020, 10/17/2022    Pneumococcal Conjugate 20-Valent (PCV20) 10/17/2022    Pneumococcal Polysaccharide (PPSV23) 04/24/2015    Shingrix 03/16/2022, 08/24/2022    Tdap 02/24/2015       Tobacco Use: Low Risk  (12/6/2023)    Patient History     Smoking Tobacco Use: Never     Smokeless Tobacco Use: Never     Passive Exposure: Not on file       Objective     Physical Exam  Vitals and nursing note reviewed.   Constitutional:       Appearance: Normal appearance.   HENT:      Head: Normocephalic.       "Nose: Nose normal.      Mouth/Throat:      Mouth: Mucous membranes are moist.   Eyes:      Pupils: Pupils are equal, round, and reactive to light.   Cardiovascular:      Rate and Rhythm: Normal rate and regular rhythm.      Pulses: Normal pulses.      Heart sounds: Normal heart sounds.   Pulmonary:      Effort: Pulmonary effort is normal.      Breath sounds: Normal breath sounds.   Abdominal:      General: Bowel sounds are normal. There is no distension.      Palpations: Abdomen is soft.   Musculoskeletal:         General: Normal range of motion.      Cervical back: Normal range of motion and neck supple.   Skin:     General: Skin is warm and dry.      Capillary Refill: Capillary refill takes less than 2 seconds.   Neurological:      General: No focal deficit present.      Mental Status: She is alert and oriented to person, place, and time.   Psychiatric:         Mood and Affect: Mood normal.         Behavior: Behavior normal.         Thought Content: Thought content normal.         Judgment: Judgment normal.         Vitals:    12/06/23 0938   BP: 110/68   Pulse: 88   Resp: 16   Temp: 97.4 °F (36.3 °C)   SpO2: 99%   Weight: 80.9 kg (178 lb 5.6 oz)   Height: 152.4 cm (60\")   PainSc: 0-No pain       Wt Readings from Last 3 Encounters:   12/06/23 80.9 kg (178 lb 5.6 oz)   03/22/23 79 kg (174 lb 2.6 oz)   01/16/23 78.7 kg (173 lb 9.6 oz)               ECOG score: 0         ECOG: (0) Fully Active - Able to Carry On All Pre-disease Performance Without Restriction  Fall Risk Assessment was completed, and patient is at low risk for falls.  PHQ-9 Total Score: 1       The patient is  experiencing fatigue. Fatigue score: 7    PT/OT Functional Screening: PT fx screen : No needs identified  Speech Functional Screening: Speech fx screen : No needs identified  Rehab to be ordered: Rehab to be ordered : No needs identified        Result Review :  The following data was reviewed by: MARIBEL Hines on 12/06/2023:  Lab " "Results   Component Value Date    HGB 14.3 12/06/2023    HCT 43.3 12/06/2023    MCV 87.3 12/06/2023     12/06/2023    WBC 8.34 12/06/2023    NEUTROABS 5.08 12/06/2023    LYMPHSABS 2.22 12/06/2023    MONOSABS 0.73 12/06/2023    EOSABS 0.25 12/06/2023    BASOSABS 0.02 12/06/2023     Lab Results   Component Value Date    GLUCOSE 226 (H) 12/06/2023    BUN 22 (H) 12/06/2023    CREATININE 1.48 (H) 12/06/2023     12/06/2023    K 4.0 12/06/2023    CL 98 12/06/2023    CO2 27.5 12/06/2023    CALCIUM 10.4 12/06/2023    PROTEINTOT 7.1 12/06/2023    ALBUMIN 4.6 12/06/2023    BILITOT 0.4 12/06/2023    ALKPHOS 135 (H) 12/06/2023    AST 17 12/06/2023    ALT 26 12/06/2023        No results found for: \"IRON\", \"LABIRON\", \"TRANSFERRIN\", \"TIBC\"  No results found for: \"LDH\", \"FERRITIN\", \"[id number]\", \"FOLATE\"  No results found for: \"PSA\", \"CEA\", \"AFP\", \"\", \"\"    No Images in the past 120 days found..         Assessment and Plan:  Diagnoses and all orders for this visit:    1. Malignant neoplasm of upper-outer quadrant of right breast in female, estrogen receptor positive [C50.411, Z17.0] (Primary)    2. Osteopenia of lumbar spine    Other orders  -     Cancel: denosumab (PROLIA) syringe 60 mg      Right breast cancer and status post treatments as outlined. Denies any current issues.with any breast lumps or masses at this time.  Due for mammogram around the end of July 2024. Last mammogram . This will be ordered at her next visit in 6 months with Prolia visit.         Labs reviewed today. CMP with creatinine of 1.48 and stable. OK for Prolia today. Due for next Prolia in June of 2024. Next BMD is due September of 2024.       I spent 20 minutes caring for Fiona on this date of service. This time includes time spent by me in the following activities:preparing for the visit, reviewing tests, counseling and educating the patient/family/caregiver, referring and communicating with other health care professionals , " documenting information in the medical record, and independently interpreting results and communicating that information with the patient/family/caregiver    Patient Follow Up:  6 months with MD.     Patient was given instructions and counseling regarding her condition or for health maintenance advice. Please see specific information pulled into the AVS if appropriate.     Kellie Enamorado, APRN    12/6/2023    .tob

## 2024-01-03 ENCOUNTER — OFFICE VISIT (OUTPATIENT)
Dept: FAMILY MEDICINE CLINIC | Facility: CLINIC | Age: 59
End: 2024-01-03
Payer: OTHER GOVERNMENT

## 2024-01-03 VITALS
DIASTOLIC BLOOD PRESSURE: 68 MMHG | SYSTOLIC BLOOD PRESSURE: 120 MMHG | HEART RATE: 94 BPM | TEMPERATURE: 96.9 F | BODY MASS INDEX: 34.55 KG/M2 | HEIGHT: 60 IN | WEIGHT: 176 LBS | OXYGEN SATURATION: 97 %

## 2024-01-03 DIAGNOSIS — R10.31 RIGHT LOWER QUADRANT ABDOMINAL PAIN: Primary | ICD-10-CM

## 2024-01-03 PROCEDURE — 99214 OFFICE O/P EST MOD 30 MIN: CPT | Performed by: FAMILY MEDICINE

## 2024-01-03 NOTE — PROGRESS NOTES
"Chief Complaint    Abdominal Pain (Sharp pain lower RT side of belly button that radiates into back x 5-6 days.  NKI.)    Subjective      Fiona Portillo presents to [facility]    History of Present Illness    1.) ABDOMINAL PAIN : Onset - 5 to 6 days ago.  Location - right lower quadrant.  No diarrhea or vomiting.  Patient does admit to ? chronic nausea, which she reports she has been experiencing in the AM with new onset abdominal symptoms.  No complaint of fevers or chills.  No changes with ingestion of solids or liquids.  No significant history of abdominal surgery.  Previous tubal ligation - performed vaginally.  Patient denies noticing a defect of her abdominal wall during this time.  No reported remitting or exacerbating factors.    Objective     Vital Signs:     /68 (BP Location: Left arm, Patient Position: Sitting, Cuff Size: Adult)   Pulse 94   Temp 96.9 °F (36.1 °C) (Temporal)   Ht 152.4 cm (60\")   Wt 79.8 kg (176 lb)   SpO2 97%   BMI 34.37 kg/m²       Physical Exam  Vitals reviewed.   Constitutional:       General: She is not in acute distress.     Appearance: Normal appearance. She is well-developed.   HENT:      Head: Normocephalic and atraumatic.      Right Ear: Hearing and external ear normal.      Left Ear: Hearing and external ear normal.      Nose: Nose normal.   Eyes:      General: Lids are normal.         Right eye: No discharge.         Left eye: No discharge.      Conjunctiva/sclera: Conjunctivae normal.   Pulmonary:      Effort: Pulmonary effort is normal.   Abdominal:      General: There is no distension.          Comments: Approximate location of tenderness with palpation.  Normoactive bowel sounds x 4.  No defect of abdominal wall appreciated.   Musculoskeletal:         General: No swelling.      Cervical back: Neck supple.   Skin:     Coloration: Skin is not jaundiced.      Findings: No erythema.   Neurological:      Mental Status: She is " alert. Mental status is at baseline.   Psychiatric:         Mood and Affect: Mood and affect normal.         Thought Content: Thought content normal.     Assessment and Plan     Diagnoses and all orders for this visit:    1. Right lower quadrant abdominal pain (Primary)  Comments:  ?  Hernia vs other etiology. Shared decision to initiate workup via US abdomen. Advised interim acetaminophen as needed for pain pain.  Orders:  -     US Abdomen Limited; Future    Follow Up     Return TBD per review of US abdomen.    Patient was given instructions and counseling regarding her condition or for health maintenance advice. Please see specific information pulled into the AVS if appropriate.

## 2024-01-04 RX ORDER — LEVOTHYROXINE SODIUM 0.12 MG/1
TABLET ORAL
Qty: 14 TABLET | Refills: 0 | Status: SHIPPED | OUTPATIENT
Start: 2024-01-04

## 2024-01-18 ENCOUNTER — HOSPITAL ENCOUNTER (OUTPATIENT)
Dept: ULTRASOUND IMAGING | Facility: HOSPITAL | Age: 59
Discharge: HOME | End: 2024-01-18
Admitting: FAMILY MEDICINE
Payer: OTHER GOVERNMENT

## 2024-01-18 DIAGNOSIS — R10.31 RIGHT LOWER QUADRANT ABDOMINAL PAIN: ICD-10-CM

## 2024-01-18 PROCEDURE — 76705 ECHO EXAM OF ABDOMEN: CPT

## 2024-01-22 ENCOUNTER — OFFICE VISIT (OUTPATIENT)
Dept: FAMILY MEDICINE CLINIC | Facility: CLINIC | Age: 59
End: 2024-01-22
Payer: OTHER GOVERNMENT

## 2024-01-22 VITALS
TEMPERATURE: 97.8 F | SYSTOLIC BLOOD PRESSURE: 118 MMHG | OXYGEN SATURATION: 98 % | HEART RATE: 116 BPM | BODY MASS INDEX: 35.15 KG/M2 | DIASTOLIC BLOOD PRESSURE: 74 MMHG | WEIGHT: 180 LBS

## 2024-01-22 DIAGNOSIS — R05.9 COUGH, UNSPECIFIED TYPE: ICD-10-CM

## 2024-01-22 DIAGNOSIS — R09.81 NASAL CONGESTION: ICD-10-CM

## 2024-01-22 DIAGNOSIS — L74.9 SWEATING ABNORMALITY: ICD-10-CM

## 2024-01-22 DIAGNOSIS — R68.89 FLU-LIKE SYMPTOMS: Primary | ICD-10-CM

## 2024-01-22 DIAGNOSIS — R10.31 RIGHT LOWER QUADRANT ABDOMINAL PAIN: Primary | ICD-10-CM

## 2024-01-22 LAB
EXPIRATION DATE: NORMAL
FLUAV AG UPPER RESP QL IA.RAPID: NOT DETECTED
FLUAV SUBTYP SPEC NAA+PROBE: NOT DETECTED
FLUBV AG UPPER RESP QL IA.RAPID: NOT DETECTED
FLUBV RNA ISLT QL NAA+PROBE: NOT DETECTED
INTERNAL CONTROL: NORMAL
Lab: NORMAL
RSV RNA NPH QL NAA+NON-PROBE: NOT DETECTED
SARS-COV-2 AG UPPER RESP QL IA.RAPID: NOT DETECTED
SARS-COV-2 RNA RESP QL NAA+PROBE: NOT DETECTED

## 2024-01-22 PROCEDURE — 87428 SARSCOV & INF VIR A&B AG IA: CPT | Performed by: NURSE PRACTITIONER

## 2024-01-22 PROCEDURE — 87637 SARSCOV2&INF A&B&RSV AMP PRB: CPT | Performed by: NURSE PRACTITIONER

## 2024-01-22 PROCEDURE — 99213 OFFICE O/P EST LOW 20 MIN: CPT | Performed by: NURSE PRACTITIONER

## 2024-01-22 RX ORDER — METHYLPREDNISOLONE 4 MG/1
TABLET ORAL
Qty: 21 EACH | Refills: 0 | Status: SHIPPED | OUTPATIENT
Start: 2024-01-22

## 2024-01-22 RX ORDER — BENZONATATE 100 MG/1
100 CAPSULE ORAL 3 TIMES DAILY PRN
Qty: 30 CAPSULE | Refills: 0 | Status: SHIPPED | OUTPATIENT
Start: 2024-01-22

## 2024-01-22 NOTE — PROGRESS NOTES
Chief Complaint  Cough, Nasal Congestion, and Night Sweats    Subjective            Fiona Portillo presents to [facility]  History of Present Illness  Patient is here today for cough nasal congestion and night sweats    Pt reports started with chills saturday and then went into the nose runny and scratchy throaty and then cough as well--took some OTC alkaselzer cold and cough pills     No Hx of asthma and no Hx of COPD     Mother lives with pt and she was concerned she would possible give her something       PHQ-2 Total Score:    PHQ-9 Total Score:      Past Medical History:   Diagnosis Date    Depression     Essential (primary) hypertension 2/19/2018    High cholesterol     Hx of radiation therapy     Malignant neoplasm of upper-outer quadrant of right breast in female, estrogen receptor positive 07/01/2021    Osteopenia of lumbar spine 07/06/2021       Allergies   Allergen Reactions    Lisinopril Cough and Unknown - Low Severity        Past Surgical History:   Procedure Laterality Date    BREAST LUMPECTOMY      right    LEFT VENTRICULAR ASSIST DEVICE      OTHER SURGICAL HISTORY      BIOPSY        Social History     Tobacco Use    Smoking status: Never     Passive exposure: Never    Smokeless tobacco: Never   Vaping Use    Vaping Use: Never used   Substance Use Topics    Alcohol use: Never    Drug use: Never       Family History   Problem Relation Age of Onset    Breast cancer Other     Breast cancer Mother         Health Maintenance Due   Topic Date Due    Hepatitis B (1 of 3 - 3-dose series) Never done    ANNUAL PHYSICAL  Never done    BMI FOLLOWUP  04/05/2023    HEMOGLOBIN A1C  08/24/2023    COVID-19 Vaccine (3 - 2023-24 season) 09/01/2023    DIABETIC FOOT EXAM  01/16/2024    URINE MICROALBUMIN  01/16/2024        Current Outpatient Medications on File Prior to Visit   Medication Sig    buPROPion XL (WELLBUTRIN XL) 150 MG 24 hr tablet TAKE 1 TABLET EVERY MORNING    Canagliflozin  (Invokana) 100 MG tablet tablet Take 1 tablet by mouth Daily.    fexofenadine (ALLEGRA) 180 MG tablet Take 1 tablet by mouth Daily.    glipizide (GLUCOTROL) 5 MG tablet TAKE 2 TABLETS DAILY    glucose blood (FREESTYLE LITE) test strip USE AS INSTRUCTED    Lancets (freestyle) lancets USE 1 LANCET DAILY AS INSTRUCTED    levothyroxine (SYNTHROID, LEVOTHROID) 125 MCG tablet TAKE 1 TABLET DAILY 30 MINUTES BEFORE BREAKFAST ON AN EMPTY STOMACH    rosuvastatin (CRESTOR) 40 MG tablet TAKE 1 TABLET DAILY    telmisartan (MICARDIS) 40 MG tablet TAKE 1 TABLET DAILY    Trulicity 0.75 MG/0.5ML solution pen-injector INJECT 0.75 MG UNDER THE SKIN INTO THE APPROPRIATE AREA ONCE A WEEK AS DIRECTED    venlafaxine XR (EFFEXOR-XR) 75 MG 24 hr capsule TAKE 1 CAPSULE DAILY     No current facility-administered medications on file prior to visit.       Immunization History   Administered Date(s) Administered    COVID-19 ([employer]) 04/09/2021    COVID-19 (PFIZER) Purple Cap Monovalent 01/14/2022    Fluzone (or Fluarix & Flulaval for VFC) >6mos 10/18/2021, 10/17/2022    Influenza, Unspecified 10/05/2016, 12/08/2020, 10/17/2022    Pneumococcal Conjugate 20-Valent (PCV20) 10/17/2022    Pneumococcal Polysaccharide (PPSV23) 04/24/2015    Shingrix 03/16/2022, 08/24/2022    Tdap 02/24/2015       Review of Systems   Constitutional:  Positive for chills, fatigue and fever.   HENT:  Positive for congestion, postnasal drip, rhinorrhea and sore throat. Negative for sinus pressure.    Respiratory:  Positive for cough. Negative for shortness of breath and wheezing.         Dry cough    Cardiovascular:  Negative for chest pain.   Gastrointestinal:  Negative for abdominal pain, blood in stool, diarrhea, nausea and vomiting.   Endocrine: Positive for heat intolerance.        Sweating in the night    Musculoskeletal:  Positive for arthralgias.   Allergic/Immunologic:        Patient with a significant history of chronic kidney disease stage III and breast cancer    Neurological:  Negative for headache.        Objective     /74   Pulse 116   Temp 97.8 °F (36.6 °C)   Wt 81.6 kg (180 lb)   SpO2 98%   BMI 35.15 kg/m²       Physical Exam  Vitals and nursing note reviewed.   Constitutional:       Appearance: Normal appearance.   HENT:      Head: Normocephalic.      Right Ear: Tympanic membrane, ear canal and external ear normal.      Left Ear: Tympanic membrane, ear canal and external ear normal.      Nose: Nose normal.      Mouth/Throat:      Mouth: Mucous membranes are moist.      Pharynx: No oropharyngeal exudate or posterior oropharyngeal erythema.   Eyes:      Pupils: Pupils are equal, round, and reactive to light.   Cardiovascular:      Rate and Rhythm: Normal rate and regular rhythm.      Heart sounds: Normal heart sounds.   Pulmonary:      Effort: Pulmonary effort is normal.      Breath sounds: Normal breath sounds.   Abdominal:      Palpations: Abdomen is soft.   Musculoskeletal:         General: Normal range of motion.      Cervical back: Normal range of motion and neck supple.   Skin:     General: Skin is warm and dry.   Neurological:      Mental Status: She is alert and oriented to person, place, and time.   Psychiatric:         Mood and Affect: Mood normal.         Behavior: Behavior normal.         Thought Content: Thought content normal.         Judgment: Judgment normal.         Result Review :                           Assessment and Plan      Diagnoses and all orders for this visit:    1. Flu-like symptoms (Primary)  -     benzonatate (Tessalon Perles) 100 MG capsule; Take 1 capsule by mouth 3 (Three) Times a Day As Needed for Cough.  Dispense: 30 capsule; Refill: 0  -     methylPREDNISolone (MEDROL) 4 MG dose pack; Take as directed on package instructions.  Dispense: 21 each; Refill: 0  -     COVID-19, FLU A/B, RSV PCR 1 HR TAT - Swab, Nasopharynx    2. Cough, unspecified type  -     POCT SARS-CoV-2 Antigen NORMA + Flu  -     benzonatate (Tessalon  Perles) 100 MG capsule; Take 1 capsule by mouth 3 (Three) Times a Day As Needed for Cough.  Dispense: 30 capsule; Refill: 0  -     methylPREDNISolone (MEDROL) 4 MG dose pack; Take as directed on package instructions.  Dispense: 21 each; Refill: 0  -     COVID-19, FLU A/B, RSV PCR 1 HR TAT - Swab, Nasopharynx    3. Nasal congestion  -     POCT SARS-CoV-2 Antigen NORMA + Flu  -     benzonatate (Tessalon Perles) 100 MG capsule; Take 1 capsule by mouth 3 (Three) Times a Day As Needed for Cough.  Dispense: 30 capsule; Refill: 0  -     methylPREDNISolone (MEDROL) 4 MG dose pack; Take as directed on package instructions.  Dispense: 21 each; Refill: 0  -     COVID-19, FLU A/B, RSV PCR 1 HR TAT - Swab, Nasopharynx    4. Sweating abnormality, at night  -     POCT SARS-CoV-2 Antigen NORMA + Flu  -     COVID-19, FLU A/B, RSV PCR 1 HR TAT - Swab, Nasopharynx    Advised patient to stay well-hydrated and we will send in the Tessalon Perles for the cough and then the Medrol Dosepak is in case she is positive for COVID or RSV and I have sent that to [place] and we will call her with the results of the send out swab-and then since the worst of her symptoms started yesterday afternoon she would still be in the 48-hour window if it is positive for flu based on the send out the in-house COVID and flu were negative-and throat was not impressive and because of her history of chronic kidney disease stage III and the prior breast cancer history and diabetes history if she is positive for COVID she would still be in the window for the lower dose Paxlovid        Follow Up     Return if symptoms worsen or fail to improve.    Patient was given instructions and counseling regarding her condition or for health maintenance advice. Please see specific information pulled into the AVS if appropriate.

## 2024-02-02 ENCOUNTER — HOSPITAL ENCOUNTER (OUTPATIENT)
Dept: CT IMAGING | Facility: HOSPITAL | Age: 59
Discharge: HOME | End: 2024-02-02
Admitting: FAMILY MEDICINE
Payer: OTHER GOVERNMENT

## 2024-02-02 DIAGNOSIS — R10.31 RIGHT LOWER QUADRANT ABDOMINAL PAIN: ICD-10-CM

## 2024-02-02 PROCEDURE — 74176 CT ABD & PELVIS W/O CONTRAST: CPT

## 2024-02-05 RX ORDER — LEVOTHYROXINE SODIUM 125 MCG
TABLET ORAL
Qty: 90 TABLET | Refills: 1 | Status: SHIPPED | OUTPATIENT
Start: 2024-02-05

## 2024-03-04 RX ORDER — DULAGLUTIDE 0.75 MG/.5ML
INJECTION, SOLUTION SUBCUTANEOUS
Qty: 6 ML | Refills: 3 | OUTPATIENT
Start: 2024-03-04

## 2024-03-05 ENCOUNTER — OFFICE VISIT (OUTPATIENT)
Dept: FAMILY MEDICINE CLINIC | Facility: CLINIC | Age: 59
End: 2024-03-05
Payer: OTHER GOVERNMENT

## 2024-03-05 VITALS
BODY MASS INDEX: 34.72 KG/M2 | DIASTOLIC BLOOD PRESSURE: 70 MMHG | TEMPERATURE: 97.1 F | WEIGHT: 177.8 LBS | OXYGEN SATURATION: 97 % | HEART RATE: 97 BPM | SYSTOLIC BLOOD PRESSURE: 100 MMHG

## 2024-03-05 DIAGNOSIS — I10 PRIMARY HYPERTENSION: ICD-10-CM

## 2024-03-05 DIAGNOSIS — E11.9 TYPE 2 DIABETES MELLITUS WITHOUT COMPLICATION, WITHOUT LONG-TERM CURRENT USE OF INSULIN: Primary | ICD-10-CM

## 2024-03-05 DIAGNOSIS — Z23 NEED FOR INFLUENZA VACCINATION: ICD-10-CM

## 2024-03-05 LAB
ALBUMIN UR-MCNC: 3.7 MG/DL
BASOPHILS # BLD AUTO: 0.06 10*3/MM3 (ref 0–0.2)
BASOPHILS NFR BLD AUTO: 0.6 % (ref 0–1.5)
CHOLEST SERPL-MCNC: 170 MG/DL (ref 0–200)
CREAT UR-MCNC: 37.5 MG/DL
DEPRECATED RDW RBC AUTO: 42.2 FL (ref 37–54)
EOSINOPHIL # BLD AUTO: 0.34 10*3/MM3 (ref 0–0.4)
EOSINOPHIL NFR BLD AUTO: 3.6 % (ref 0.3–6.2)
ERYTHROCYTE [DISTWIDTH] IN BLOOD BY AUTOMATED COUNT: 13.5 % (ref 12.3–15.4)
HBA1C MFR BLD: 8.2 % (ref 4.8–5.6)
HCT VFR BLD AUTO: 43.9 % (ref 34–46.6)
HDLC SERPL-MCNC: 48 MG/DL (ref 40–60)
HGB BLD-MCNC: 14.4 G/DL (ref 12–15.9)
IMM GRANULOCYTES # BLD AUTO: 0.03 10*3/MM3 (ref 0–0.05)
IMM GRANULOCYTES NFR BLD AUTO: 0.3 % (ref 0–0.5)
LDLC SERPL CALC-MCNC: 99 MG/DL (ref 0–100)
LDLC/HDLC SERPL: 2 {RATIO}
LYMPHOCYTES # BLD AUTO: 2.4 10*3/MM3 (ref 0.7–3.1)
LYMPHOCYTES NFR BLD AUTO: 25.7 % (ref 19.6–45.3)
MCH RBC QN AUTO: 28.5 PG (ref 26.6–33)
MCHC RBC AUTO-ENTMCNC: 32.8 G/DL (ref 31.5–35.7)
MCV RBC AUTO: 86.8 FL (ref 79–97)
MICROALBUMIN/CREAT UR: 98.7 MG/G (ref 0–29)
MONOCYTES # BLD AUTO: 0.97 10*3/MM3 (ref 0.1–0.9)
MONOCYTES NFR BLD AUTO: 10.4 % (ref 5–12)
NEUTROPHILS NFR BLD AUTO: 5.53 10*3/MM3 (ref 1.7–7)
NEUTROPHILS NFR BLD AUTO: 59.4 % (ref 42.7–76)
NRBC BLD AUTO-RTO: 0 /100 WBC (ref 0–0.2)
PLATELET # BLD AUTO: 264 10*3/MM3 (ref 140–450)
PMV BLD AUTO: 11.2 FL (ref 6–12)
RBC # BLD AUTO: 5.06 10*6/MM3 (ref 3.77–5.28)
TRIGL SERPL-MCNC: 131 MG/DL (ref 0–150)
VLDLC SERPL-MCNC: 23 MG/DL (ref 5–40)
WBC NRBC COR # BLD AUTO: 9.33 10*3/MM3 (ref 3.4–10.8)

## 2024-03-05 PROCEDURE — 85025 COMPLETE CBC W/AUTO DIFF WBC: CPT | Performed by: FAMILY MEDICINE

## 2024-03-05 PROCEDURE — 82043 UR ALBUMIN QUANTITATIVE: CPT | Performed by: FAMILY MEDICINE

## 2024-03-05 PROCEDURE — 83036 HEMOGLOBIN GLYCOSYLATED A1C: CPT | Performed by: FAMILY MEDICINE

## 2024-03-05 PROCEDURE — 80061 LIPID PANEL: CPT | Performed by: FAMILY MEDICINE

## 2024-03-05 PROCEDURE — 82570 ASSAY OF URINE CREATININE: CPT | Performed by: FAMILY MEDICINE

## 2024-03-05 RX ORDER — DULAGLUTIDE 0.75 MG/.5ML
0.75 INJECTION, SOLUTION SUBCUTANEOUS WEEKLY
Qty: 6 ML | Refills: 1 | Status: SHIPPED | OUTPATIENT
Start: 2024-03-05 | End: 2024-06-03

## 2024-03-05 NOTE — PROGRESS NOTES
Chief Complaint    Diabetes (Refills and fasting labs)    Subjective      Fiona Portillo presents to [facility]    History of Present Illness    1.) NIDDM/HTN : Most recent hemoglobin A1c measured at 7.30%.  No complaints regarding current medications, no complaints of symptoms concerning for hypo- or hyperglycemia, since last visit here in office.  Blood pressure is controlled & stable, as noted.  No concerns regarding current antihypertensive.  Patient presents with no other complaints today.    Objective     Vital Signs:     /70   Pulse 97   Temp 97.1 °F (36.2 °C)   Wt 80.6 kg (177 lb 12.8 oz)   SpO2 97%   BMI 34.72 kg/m²       Physical Exam  Vitals reviewed.   Constitutional:       General: She is not in acute distress.     Appearance: Normal appearance. She is well-developed.   HENT:      Head: Normocephalic and atraumatic.      Right Ear: Hearing and external ear normal.      Left Ear: Hearing and external ear normal.      Nose: Nose normal.   Eyes:      General: Lids are normal.         Right eye: No discharge.         Left eye: No discharge.      Conjunctiva/sclera: Conjunctivae normal.   Cardiovascular:      Rate and Rhythm: Normal rate and regular rhythm.   Pulmonary:      Effort: Pulmonary effort is normal.      Breath sounds: Normal breath sounds.   Abdominal:      General: There is no distension.   Musculoskeletal:         General: No swelling.      Cervical back: Neck supple.   Skin:     Coloration: Skin is not jaundiced.      Findings: No erythema.   Neurological:      Mental Status: She is alert. Mental status is at baseline.   Psychiatric:         Mood and Affect: Mood and affect normal.         Thought Content: Thought content normal.     BMI is >= 30 and <35. (Class 1 Obesity). The following options were offered after discussion;: Information on healthy weight added to patient's after visit summary.    Assessment and Plan     Diagnoses and all orders  for this visit:    1. Type 2 diabetes mellitus without complication, without long-term current use of insulin (Primary)  Comments:  Labs ordered as noted - continue to monitor diet. Continue rx as prescribed. Additional recs per review of labs.  Orders:  -     Hemoglobin A1c  -     Lipid Panel  -     CBC & Differential  -     Microalbumin / Creatinine Urine Ratio - Urine, Clean Catch    2. Primary hypertension  Comments:  Controlled. Continue anti-hypertensive, as prescribed.    3. Need for influenza vaccination  -     Fluzone (or Fluarix & Flulaval for VFC) >6 Mos ([id number])    Other orders  -     Dulaglutide (Trulicity) 0.75 MG/0.5ML solution pen-injector; Inject 0.75 mg into the appropriate muscle as directed by prescriber 1 (One) Time Per Week for 90 days.  Dispense: 6 mL; Refill: 1    Follow Up     Return in about 3 months (around 6/5/2024).    Patient was given instructions and counseling regarding her condition or for health maintenance advice. Please see specific information pulled into the AVS if appropriate.

## 2024-03-05 NOTE — PROGRESS NOTES
Venipuncture Blood Specimen Collection  Venipuncture performed in [place] by Zelda Rodríguez MA with good hemostasis. Patient tolerated the procedure well without complications.   03/05/24   Zelda Rodríguez MA

## 2024-05-14 ENCOUNTER — APPOINTMENT (OUTPATIENT)
Dept: GENERAL RADIOLOGY | Facility: HOSPITAL | Age: 59
End: 2024-05-14
Payer: COMMERCIAL

## 2024-05-14 ENCOUNTER — HOSPITAL ENCOUNTER (EMERGENCY)
Facility: HOSPITAL | Age: 59
Discharge: HOME | End: 2024-05-14
Attending: EMERGENCY MEDICINE
Payer: COMMERCIAL

## 2024-05-14 VITALS
DIASTOLIC BLOOD PRESSURE: 73 MMHG | HEIGHT: 60 IN | TEMPERATURE: 98.4 F | OXYGEN SATURATION: 95 % | BODY MASS INDEX: 34.63 KG/M2 | RESPIRATION RATE: 20 BRPM | WEIGHT: 176.37 LBS | SYSTOLIC BLOOD PRESSURE: 107 MMHG | HEART RATE: 104 BPM

## 2024-05-14 DIAGNOSIS — M25.532 LEFT WRIST PAIN: ICD-10-CM
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
## 2024-05-14 DIAGNOSIS — R07.89 CHEST WALL PAIN: ICD-10-CM

## 2024-05-14 PROCEDURE — 71045 X-RAY EXAM CHEST 1 VIEW: CPT

## 2024-05-14 PROCEDURE — 99283 EMERGENCY DEPT VISIT LOW MDM: CPT

## 2024-05-14 PROCEDURE — 73110 X-RAY EXAM OF WRIST: CPT

## 2024-05-14 RX ORDER — IBUPROFEN 400 MG/1
800 TABLET ORAL ONCE
Status: COMPLETED | OUTPATIENT
Start: 2024-05-14 | End: 2024-05-14

## 2024-05-14 RX ADMIN — IBUPROFEN 800 MG: 400 TABLET, FILM COATED ORAL at 14:54
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
## 2024-05-14 NOTE — DISCHARGE INSTRUCTIONS
You should expect to be sore for several days.  You can take Tylenol or ibuprofen as needed for pain.  See your PCP for follow-up and return to ED for any new or worsening symptoms

## 2024-05-28 RX ORDER — CANAGLIFLOZIN 100 MG/1
1 TABLET, FILM COATED ORAL DAILY
Qty: 90 TABLET | Refills: 1 | Status: SHIPPED | OUTPATIENT
Start: 2024-05-28

## 2024-06-05 ENCOUNTER — OFFICE VISIT (OUTPATIENT)
Dept: ONCOLOGY | Facility: HOSPITAL | Age: 59
End: 2024-06-05
Payer: OTHER GOVERNMENT

## 2024-06-05 ENCOUNTER — HOSPITAL ENCOUNTER (OUTPATIENT)
Dept: ONCOLOGY | Facility: HOSPITAL | Age: 59
Discharge: HOME | End: 2024-06-05
Payer: OTHER GOVERNMENT

## 2024-06-05 ENCOUNTER — LAB (OUTPATIENT)
Dept: ONCOLOGY | Facility: HOSPITAL | Age: 59
End: 2024-06-05
Payer: OTHER GOVERNMENT

## 2024-06-05 VITALS
SYSTOLIC BLOOD PRESSURE: 112 MMHG | WEIGHT: 177 LBS | DIASTOLIC BLOOD PRESSURE: 67 MMHG | HEART RATE: 98 BPM | BODY MASS INDEX: 34.57 KG/M2 | OXYGEN SATURATION: 100 %

## 2024-06-05 VITALS
SYSTOLIC BLOOD PRESSURE: 112 MMHG | OXYGEN SATURATION: 100 % | DIASTOLIC BLOOD PRESSURE: 67 MMHG | HEART RATE: 98 BPM | WEIGHT: 177 LBS | BODY MASS INDEX: 34.57 KG/M2

## 2024-06-05 DIAGNOSIS — M85.88 OSTEOPENIA OF LUMBAR SPINE: ICD-10-CM

## 2024-06-05 DIAGNOSIS — Z17.0 MALIGNANT NEOPLASM OF UPPER-OUTER QUADRANT OF RIGHT BREAST IN FEMALE, ESTROGEN RECEPTOR POSITIVE: ICD-10-CM

## 2024-06-05 DIAGNOSIS — M85.88 OSTEOPENIA OF LUMBAR SPINE: Primary | ICD-10-CM

## 2024-06-05 DIAGNOSIS — C50.411 MALIGNANT NEOPLASM OF UPPER-OUTER QUADRANT OF RIGHT BREAST IN FEMALE, ESTROGEN RECEPTOR POSITIVE: ICD-10-CM

## 2024-06-05 LAB
ALBUMIN SERPL-MCNC: 4.5 G/DL (ref 3.5–5.2)
ALBUMIN/GLOB SERPL: 1.9 G/DL
ALP SERPL-CCNC: 64 U/L (ref 39–117)
ALT SERPL W P-5'-P-CCNC: 27 U/L (ref 1–33)
ANION GAP SERPL CALCULATED.3IONS-SCNC: 9.2 MMOL/L (ref 5–15)
AST SERPL-CCNC: 18 U/L (ref 1–32)
BASOPHILS # BLD AUTO: 0.03 10*3/MM3 (ref 0–0.2)
BASOPHILS NFR BLD AUTO: 0.5 % (ref 0–1.5)
BILIRUB SERPL-MCNC: 0.5 MG/DL (ref 0–1.2)
BUN SERPL-MCNC: 22 MG/DL (ref 6–20)
BUN/CREAT SERPL: 14.1 (ref 7–25)
CALCIUM SPEC-SCNC: 9.7 MG/DL (ref 8.6–10.5)
CHLORIDE SERPL-SCNC: 99 MMOL/L (ref 98–107)
CO2 SERPL-SCNC: 27.8 MMOL/L (ref 22–29)
CREAT SERPL-MCNC: 1.56 MG/DL (ref 0.57–1)
DEPRECATED RDW RBC AUTO: 46.1 FL (ref 37–54)
EGFRCR SERPLBLD CKD-EPI 2021: 38.4 ML/MIN/1.73
EOSINOPHIL # BLD AUTO: 0.24 10*3/MM3 (ref 0–0.4)
EOSINOPHIL NFR BLD AUTO: 3.8 % (ref 0.3–6.2)
ERYTHROCYTE [DISTWIDTH] IN BLOOD BY AUTOMATED COUNT: 14.3 % (ref 12.3–15.4)
GLOBULIN UR ELPH-MCNC: 2.4 GM/DL
GLUCOSE SERPL-MCNC: 210 MG/DL (ref 65–99)
HCT VFR BLD AUTO: 42.4 % (ref 34–46.6)
HGB BLD-MCNC: 13.5 G/DL (ref 12–15.9)
IMM GRANULOCYTES # BLD AUTO: 0.05 10*3/MM3 (ref 0–0.05)
IMM GRANULOCYTES NFR BLD AUTO: 0.8 % (ref 0–0.5)
LYMPHOCYTES # BLD AUTO: 1.15 10*3/MM3 (ref 0.7–3.1)
LYMPHOCYTES NFR BLD AUTO: 18.3 % (ref 19.6–45.3)
MAGNESIUM SERPL-MCNC: 2 MG/DL (ref 1.6–2.6)
MCH RBC QN AUTO: 28.2 PG (ref 26.6–33)
MCHC RBC AUTO-ENTMCNC: 31.8 G/DL (ref 31.5–35.7)
MCV RBC AUTO: 88.7 FL (ref 79–97)
MONOCYTES # BLD AUTO: 1.07 10*3/MM3 (ref 0.1–0.9)
MONOCYTES NFR BLD AUTO: 17 % (ref 5–12)
NEUTROPHILS NFR BLD AUTO: 3.74 10*3/MM3 (ref 1.7–7)
NEUTROPHILS NFR BLD AUTO: 59.6 % (ref 42.7–76)
PHOSPHATE SERPL-MCNC: 3.4 MG/DL (ref 2.5–4.5)
PLATELET # BLD AUTO: 194 10*3/MM3 (ref 140–450)
PMV BLD AUTO: 10.4 FL (ref 6–12)
POTASSIUM SERPL-SCNC: 4.4 MMOL/L (ref 3.5–5.2)
PROT SERPL-MCNC: 6.9 G/DL (ref 6–8.5)
RBC # BLD AUTO: 4.78 10*6/MM3 (ref 3.77–5.28)
SODIUM SERPL-SCNC: 136 MMOL/L (ref 136–145)
WBC NRBC COR # BLD AUTO: 6.28 10*3/MM3 (ref 3.4–10.8)

## 2024-06-05 PROCEDURE — 84100 ASSAY OF PHOSPHORUS: CPT

## 2024-06-05 PROCEDURE — 85025 COMPLETE CBC W/AUTO DIFF WBC: CPT

## 2024-06-05 PROCEDURE — 83735 ASSAY OF MAGNESIUM: CPT

## 2024-06-05 PROCEDURE — 36415 COLL VENOUS BLD VENIPUNCTURE: CPT

## 2024-06-05 PROCEDURE — 96372 THER/PROPH/DIAG INJ SC/IM: CPT

## 2024-06-05 PROCEDURE — 25010000002 DENOSUMAB 60 MG/ML SOLUTION PREFILLED SYRINGE: Performed by: INTERNAL MEDICINE

## 2024-06-05 PROCEDURE — 80053 COMPREHEN METABOLIC PANEL: CPT

## 2024-06-05 RX ADMIN — DENOSUMAB 60 MG: 60 INJECTION SUBCUTANEOUS at 09:50

## 2024-06-05 NOTE — PROGRESS NOTES
Chief Complaint  Malignant neoplasm of upper-outer quadrant of right breast       Marcell Alejandrakenny, DO    Subjective          Fiona Portillo presents to [facility] for ongoing follow-up of her breast cancer and treatment of her osteopenia.  She is on Prolia.  Tolerating the injections well.  Denies mouth sores, dental or jaw pain.  She denies new masses, adenopathy, unusual aches or pains.  She is exercising routinely.  She takes calcium and vitamin D.    Oncology/Hematology History Overview Note   Right breast invasive ductal carcinoma ER/TN+  HER2+            Clinical Staging      Stage IA ([id number])            Treatments      Chemotherapy      12/30/15 thru 4/20/16 completed 6C TCHP--completed 1yr Herceptin      Arimidex   began. 6/25/16    completed 2022     3/14/18 Prolia initiated        Radiation Therapy      5/10/16 thru 6/24/16 completed [id number] right breast        Surgeries      11/23/2015 right sided lumpectomy and sentinel lymph node biopsy.         Malignant neoplasm of upper-outer quadrant of right breast in female, estrogen receptor positive   7/1/2021 Initial Diagnosis    Breast CA (CMS/HCC)     7/6/2021 Cancer Staged    Staging form: Breast, AJCC 8th Edition  - Clinical: cT1c, cN0, cM0, ER+, TN+, HER2+ - Signed by Champ Estrada MD on 7/6/2021         Review of Systems   HENT:  Positive for congestion, sinus pressure, sneezing and sore throat.    Respiratory:  Positive for cough.      Current Outpatient Medications on File Prior to Visit   Medication Sig Dispense Refill    buPROPion XL (WELLBUTRIN XL) 150 MG 24 hr tablet TAKE 1 TABLET EVERY MORNING 90 tablet 3    Canagliflozin (Invokana) 100 MG tablet tablet TAKE 1 TABLET DAILY 90 tablet 1    glipizide (GLUCOTROL) 5 MG tablet TAKE 2 TABLETS DAILY 180 tablet 3    glucose blood (FREESTYLE LITE) test strip USE AS INSTRUCTED 100 each 4    Lancets (freestyle) lancets USE 1 LANCET DAILY AS INSTRUCTED 200 each 3     levothyroxine (Synthroid) 125 MCG tablet TAKE 1 TABLET DAILY 30 MINUTES BEFORE BREAKFAST ON AN EMPTY STOMACH 90 tablet 1    rosuvastatin (CRESTOR) 40 MG tablet TAKE 1 TABLET DAILY 90 tablet 3    telmisartan (MICARDIS) 40 MG tablet TAKE 1 TABLET DAILY 90 tablet 3    venlafaxine XR (EFFEXOR-XR) 75 MG 24 hr capsule TAKE 1 CAPSULE DAILY 90 capsule 3     No current facility-administered medications on file prior to visit.       Allergies   Allergen Reactions    Lisinopril Cough and Unknown - Low Severity     Past Medical History:   Diagnosis Date    Depression     Essential (primary) hypertension 2/19/2018    High cholesterol     Hx of radiation therapy     Malignant neoplasm of upper-outer quadrant of right breast in female, estrogen receptor positive 07/01/2021    Osteopenia of lumbar spine 07/06/2021     Past Surgical History:   Procedure Laterality Date    BREAST LUMPECTOMY      right    LEFT VENTRICULAR ASSIST DEVICE      OTHER SURGICAL HISTORY      BIOPSY     Social History     Socioeconomic History    Marital status:     Number of children: 2   Tobacco Use    Smoking status: Never     Passive exposure: Never    Smokeless tobacco: Never   Vaping Use    Vaping status: Never Used   Substance and Sexual Activity    Alcohol use: Never    Drug use: Never    Sexual activity: Defer     Family History   Problem Relation Age of Onset    Breast cancer Other     Breast cancer Mother        Objective   Physical Exam  Vitals reviewed. Exam conducted with a chaperone present.   Cardiovascular:      Rate and Rhythm: Normal rate and regular rhythm.      Heart sounds: Normal heart sounds. No murmur heard.     No gallop.   Pulmonary:      Effort: Pulmonary effort is normal.      Breath sounds: Normal breath sounds.   Chest:   Breasts:     Left: Normal.       Abdominal:      General: Abdomen is flat. Bowel sounds are normal.      Palpations: Abdomen is soft.   Lymphadenopathy:      Cervical: No cervical adenopathy.      Upper  "Body:      Right upper body: No supraclavicular or axillary adenopathy.      Left upper body: No supraclavicular or axillary adenopathy.   Psychiatric:         Mood and Affect: Mood normal.         Behavior: Behavior normal.         Vitals:    06/05/24 0855   BP: 112/67   Pulse: 98   SpO2: 100%   Weight: 80.3 kg (177 lb)     ECOG score: 0         PHQ-9 Total Score:                    Result Review :   The following data was reviewed by: Champ Estrada MD on 06/05/2024:  Lab Results   Component Value Date    HGB 13.5 06/05/2024    HCT 42.4 06/05/2024    MCV 88.7 06/05/2024     06/05/2024    WBC 6.28 06/05/2024    NEUTROABS 3.74 06/05/2024    LYMPHSABS 1.15 06/05/2024    MONOSABS 1.07 (H) 06/05/2024    EOSABS 0.24 06/05/2024    BASOSABS 0.03 06/05/2024     Lab Results   Component Value Date    GLUCOSE 210 (H) 06/05/2024    BUN 22 (H) 06/05/2024    CREATININE 1.56 (H) 06/05/2024     06/05/2024    K 4.4 06/05/2024    CL 99 06/05/2024    CO2 27.8 06/05/2024    CALCIUM 9.7 06/05/2024    PROTEINTOT 6.9 06/05/2024    ALBUMIN 4.5 06/05/2024    BILITOT 0.5 06/05/2024    ALKPHOS 64 06/05/2024    AST 18 06/05/2024    ALT 27 06/05/2024     Lab Results   Component Value Date    MG 2.0 06/05/2024    PHOS 3.4 06/05/2024    FREET4 1.8 03/02/2021    TSH 3.120 01/16/2023     No results found for: \"IRON\", \"LABIRON\", \"TRANSFERRIN\", \"TIBC\"  No results found for: \"LDH\", \"FERRITIN\", \"[id number]\", \"FOLATE\"  No results found for: \"PSA\", \"CEA\", \"AFP\", \"\", \"\"          Assessment and Plan    Diagnoses and all orders for this visit:    1. Osteopenia of lumbar spine (Primary)  Assessment & Plan:  Patient is on treatment with Prolia injections.  Tolerating well.  Lab work is adequate for treatment.  Proceed with Prolia as planned.  I will see her back in 6 months for OV, Prolia with lab work prior to monitor for toxicities and DEXA scan to assess response to therapy.    Orders:  -     CBC and Differential; Future  -     " Comprehensive metabolic panel; Future  -     Magnesium; Future  -     Phosphorus; Future  -     DEXA Bone Density Axial; Future  -     CBC & Differential; Future  -     Comprehensive Metabolic Panel; Future  -     Magnesium; Future  -     Phosphorus; Future    2. Malignant neoplasm of upper-outer quadrant of right breast in female, estrogen receptor positive  Assessment & Plan:  Status post treatments as outlined.  Patient is doing well.  I see no evidence of disease recurrence by history or physical examination.  I will see her back in 6 months for continued surveillance with mammogram prior    Orders:  -     Mammo Diagnostic Digital Tomosynthesis Bilateral With CAD; Future  -     DEXA Bone Density Axial; Future  -     CBC & Differential; Future  -     Comprehensive Metabolic Panel; Future  -     Magnesium; Future  -     Phosphorus; Future    Other orders  -     Cancel: denosumab (PROLIA) syringe 60 mg            Patient Follow Up: 6 months    Patient was given instructions and counseling regarding her condition or for health maintenance advice. Please see specific information pulled into the AVS if appropriate.     Champ Estrada MD    6/5/2024

## 2024-06-05 NOTE — ASSESSMENT & PLAN NOTE
Patient is on treatment with Prolia injections.  Tolerating well.  Lab work is adequate for treatment.  Proceed with Prolia as planned.  I will see her back in 6 months for OV, Prolia with lab work prior to monitor for toxicities and DEXA scan to assess response to therapy.

## 2024-06-05 NOTE — ASSESSMENT & PLAN NOTE
Status post treatments as outlined.  Patient is doing well.  I see no evidence of disease recurrence by history or physical examination.  I will see her back in 6 months for continued surveillance with mammogram prior

## 2024-06-10 ENCOUNTER — TRANSCRIBE ORDERS (OUTPATIENT)
Dept: ADMINISTRATIVE | Facility: HOSPITAL | Age: 59
End: 2024-06-10
Payer: OTHER GOVERNMENT

## 2024-06-10 DIAGNOSIS — Z12.31 SCREENING MAMMOGRAM, ENCOUNTER FOR: Primary | ICD-10-CM

## 2024-06-12 ENCOUNTER — TELEMEDICINE (OUTPATIENT)
Dept: FAMILY MEDICINE CLINIC | Facility: TELEHEALTH | Age: 59
End: 2024-06-12
Payer: OTHER GOVERNMENT

## 2024-06-12 VITALS — TEMPERATURE: 97.2 F | HEART RATE: 89 BPM

## 2024-06-12 DIAGNOSIS — H66.90 ACUTE OTITIS MEDIA, UNSPECIFIED OTITIS MEDIA TYPE: Primary | ICD-10-CM

## 2024-06-12 DIAGNOSIS — J01.40 ACUTE PANSINUSITIS, RECURRENCE NOT SPECIFIED: ICD-10-CM

## 2024-06-12 PROCEDURE — 99213 OFFICE O/P EST LOW 20 MIN: CPT | Performed by: NURSE PRACTITIONER

## 2024-06-12 RX ORDER — AMOXICILLIN 875 MG/1
875 TABLET, COATED ORAL 2 TIMES DAILY
Qty: 14 TABLET | Refills: 0 | Status: SHIPPED | OUTPATIENT
Start: 2024-06-12 | End: 2024-06-19

## 2024-06-12 NOTE — PROGRESS NOTES
You have chosen to receive care through a telehealth visit.  Do you consent to use a video/audio connection for your medical care today? Yes     HPI  Fiona Portillo is a 59 y.o. female  presents with complaint of ear ache, cough, cough. She reports right earache, cough, and drainage. Additional symptoms that she is having are noted in the ROS portion of this visit. She hs had her sinus symptoms for a week. Her ear just started hurting. She tried otc ear drops and it did not help her at all. She has also taken mucinesx for her cough and sinus symptoms.    Review of Systems   Constitutional:  Negative for fever.   HENT:  Positive for congestion (clear), ear pain (right), sinus pressure (right maxillary) and sinus pain (right maxillary). Negative for sore throat. Postnasal drip: probably does not know.   Respiratory:  Positive for cough. Negative for chest tightness, shortness of breath and wheezing.         Productive yellow tint   Musculoskeletal:  Negative for myalgias.   Neurological:  Negative for headaches.       Past Medical History:   Diagnosis Date    Depression     Essential (primary) hypertension 2/19/2018    High cholesterol     Hx of radiation therapy     Malignant neoplasm of upper-outer quadrant of right breast in female, estrogen receptor positive 07/01/2021    Osteopenia of lumbar spine 07/06/2021       Family History   Problem Relation Age of Onset    Breast cancer Other     Breast cancer Mother        Social History     Socioeconomic History    Marital status:     Number of children: 2   Tobacco Use    Smoking status: Never     Passive exposure: Never    Smokeless tobacco: Never   Vaping Use    Vaping status: Never Used   Substance and Sexual Activity    Alcohol use: Never    Drug use: Never    Sexual activity: Defer       Fiona Portillo  reports that she has never smoked. She has never been exposed to tobacco smoke. She has never used smokeless tobacco. She does not dip     Pulse 89   Temp  97.2 °F (36.2 °C)     PHYSICAL EXAM  Physical Exam   Constitutional: She is oriented to person, place, and time. She appears well-developed.   HENT:   Head: Normocephalic and atraumatic.   Right Ear: There is tenderness.   Nose: Congestion present. Right sinus exhibits maxillary sinus tenderness (patient directed exam).   Right auditory canal erythematous, TM mildly erythematous with serous fluid noted   Mild edema right maxillary region   Eyes: Lids are normal. Right eye exhibits no discharge. Left eye exhibits no discharge. Right conjunctiva is not injected. Left conjunctiva is not injected.   Cardiovascular:       Heart rate normal   Pulmonary/Chest:  No respiratory distress (lungs clear).  Neurological: She is alert and oriented to person, place, and time. No cranial nerve deficit.   Psychiatric: She has a normal mood and affect. Her speech is normal and behavior is normal. Judgment and thought content normal.       Results for orders placed or performed in visit on 06/05/24   Comprehensive metabolic panel    Specimen: Arm, Right; Blood   Result Value Ref Range    Glucose 210 (H) 65 - 99 mg/dL    BUN 22 (H) 6 - 20 mg/dL    Creatinine 1.56 (H) 0.57 - 1.00 mg/dL    Sodium 136 136 - 145 mmol/L    Potassium 4.4 3.5 - 5.2 mmol/L    Chloride 99 98 - 107 mmol/L    CO2 27.8 22.0 - 29.0 mmol/L    Calcium 9.7 8.6 - 10.5 mg/dL    Total Protein 6.9 6.0 - 8.5 g/dL    Albumin 4.5 3.5 - 5.2 g/dL    ALT (SGPT) 27 1 - 33 U/L    AST (SGOT) 18 1 - 32 U/L    Alkaline Phosphatase 64 39 - 117 U/L    Total Bilirubin 0.5 0.0 - 1.2 mg/dL    Globulin 2.4 gm/dL    A/G Ratio 1.9 g/dL    BUN/Creatinine Ratio 14.1 7.0 - 25.0    Anion Gap 9.2 5.0 - 15.0 mmol/L    eGFR 38.4 (L) >60.0 mL/min/1.73   Magnesium    Specimen: Arm, Right; Blood   Result Value Ref Range    Magnesium 2.0 1.6 - 2.6 mg/dL   Phosphorus    Specimen: Arm, Right; Blood   Result Value Ref Range    Phosphorus 3.4 2.5 - 4.5 mg/dL   CBC Auto Differential    Specimen: Arm, Right;  Blood   Result Value Ref Range    WBC 6.28 3.40 - 10.80 10*3/mm3    RBC 4.78 3.77 - 5.28 10*6/mm3    Hemoglobin 13.5 12.0 - 15.9 g/dL    Hematocrit 42.4 34.0 - 46.6 %    MCV 88.7 79.0 - 97.0 fL    MCH 28.2 26.6 - 33.0 pg    MCHC 31.8 31.5 - 35.7 g/dL    RDW 14.3 12.3 - 15.4 %    RDW-SD 46.1 37.0 - 54.0 fl    MPV 10.4 6.0 - 12.0 fL    Platelets 194 140 - 450 10*3/mm3    Neutrophil % 59.6 42.7 - 76.0 %    Lymphocyte % 18.3 (L) 19.6 - 45.3 %    Monocyte % 17.0 (H) 5.0 - 12.0 %    Eosinophil % 3.8 0.3 - 6.2 %    Basophil % 0.5 0.0 - 1.5 %    Immature Grans % 0.8 (H) 0.0 - 0.5 %    Neutrophils, Absolute 3.74 1.70 - 7.00 10*3/mm3    Lymphocytes, Absolute 1.15 0.70 - 3.10 10*3/mm3    Monocytes, Absolute 1.07 (H) 0.10 - 0.90 10*3/mm3    Eosinophils, Absolute 0.24 0.00 - 0.40 10*3/mm3    Basophils, Absolute 0.03 0.00 - 0.20 10*3/mm3    Immature Grans, Absolute 0.05 0.00 - 0.05 10*3/mm3       Diagnoses and all orders for this visit:    1. Acute otitis media, unspecified otitis media type (Primary)    2. Acute pansinusitis, recurrence not specified    Other orders  -     amoxicillin (AMOXIL) 875 MG tablet; Take 1 tablet by mouth 2 (Two) Times a Day for 7 days.  Dispense: 14 tablet; Refill: 0    Amoxicillin as directed  Probiotics for two weeks related to taking antibiotics. The pharmacist can help you with this if needed. Do not take within two hours of antibiotic.  Continue Mucinex with plenty of fluids especially water to thin secretions and help with congestion.  May alternate tylenol or ibuprofen for pain or fever    FOLLOW-UP  If symptoms worsen or persist follow up with PCP, [facility] or Urgent Care    Patient verbalizes understanding of medication dosage, comfort measures, instructions for treatment and follow-up.    Gabi Steven, MARIBEL  06/12/2024  09:38 EDT    The use of a video visit has been reviewed with the patient and verbal informed consent has been obtained. Myself and Fiona Portillo participated in this  visit. The patient is located in [address] [place] [address].    I am located in [place], [place]. Mychart and Tyto were utilized. I spent 27 minutes in the patient's chart for this visit.

## 2024-06-12 NOTE — PATIENT INSTRUCTIONS
Otitis Media, Adult    Otitis media occurs when there is inflammation and fluid in the middle ear with signs and symptoms of an acute infection. The middle ear is a part of the ear that contains bones for hearing as well as air that helps send sounds to the brain. When infected fluid builds up in this space, it causes pressure and can lead to an ear infection. The eustachian tube connects the middle ear to the back of the nose (nasopharynx) and normally allows air into the middle ear. If the eustachian tube becomes blocked, fluid can build up and become infected.  What are the causes?  This condition is caused by a blockage in the eustachian tube. This can be caused by mucus or by swelling of the tube. Problems that can cause a blockage include:  A cold or other upper respiratory infection.  Allergies.  An irritant, such as tobacco smoke.  Enlarged adenoids. The adenoids are areas of soft tissue located high in the back of the throat, behind the nose and the roof of the mouth. They are part of the body's defense system (immune system).  A mass in the nasopharynx.  Damage to the ear caused by pressure changes (barotrauma).  What increases the risk?  You are more likely to develop this condition if you:  Smoke or are exposed to tobacco smoke.  Have an opening in the roof of your mouth (cleft palate).  Have gastroesophageal reflux.  Have an immune system disorder.  What are the signs or symptoms?  Symptoms of this condition include:  Ear pain.  Fever.  Decreased hearing.  Tiredness (lethargy).  Fluid leaking from the ear, if the eardrum is ruptured or has burst.  Ringing in the ear.  How is this diagnosed?    This condition is diagnosed with a physical exam. During the exam, your health care provider will use an instrument called an otoscope to look in your ear and check for redness, swelling, and fluid. He or she will also ask about your symptoms.  Your health care provider may also order tests, such as:  A pneumatic  otoscopy. This is a test to check the movement of the eardrum. It is done by squeezing a small amount of air into the ear.  A tympanogram. This is a test that shows how well the eardrum moves in response to air pressure in the ear canal. It provides a graph for your health care provider to review.  How is this treated?  This condition can go away on its own within 3-5 days. But if the condition is caused by a bacterial infection and does not go away on its own, or if it keeps coming back, your health care provider may:  Prescribe antibiotic medicine to treat the infection.  Prescribe or recommend medicines to control pain.  Follow these instructions at home:  Take over-the-counter and prescription medicines only as told by your health care provider.  If you were prescribed an antibiotic medicine, take it as told by your health care provider. Do not stop taking the antibiotic even if you start to feel better.  Keep all follow-up visits. This is important.  Contact a health care provider if:  You have bleeding from your nose.  There is a lump on your neck.  You are not feeling better in 5 days.  You feel worse instead of better.  Get help right away if:  You have severe pain that is not controlled with medicine.  You have swelling, redness, or pain around your ear.  You have stiffness in your neck.  A part of your face is not moving (paralyzed).  The bone behind your ear (mastoid bone) is tender when you touch it.  You develop a severe headache.  Summary  Otitis media is redness, soreness, and swelling of the middle ear, usually resulting in pain and decreased hearing.  This condition can go away on its own within 3-5 days.  If the problem does not go away in 3-5 days, your health care provider may give you medicines to treat the infection.  If you were prescribed an antibiotic medicine, take it as told by your health care provider.  Follow all instructions that were given to you by your health care provider.  This  information is not intended to replace advice given to you by your health care provider. Make sure you discuss any questions you have with your health care provider.  Document Revised: 03/28/2022 Document Reviewed: 03/28/2022  Elsevier Patient Education © 2024 [employer]  Sinus Infection, Adult  A sinus infection, also called sinusitis, is inflammation of your sinuses. Sinuses are hollow spaces in the bones around your face. Your sinuses are located:  Around your eyes.  In the middle of your forehead.  Behind your nose.  In your cheekbones.  Mucus normally drains out of your sinuses. When your nasal tissues become inflamed or swollen, mucus can become trapped or blocked. This allows bacteria, viruses, and fungi to grow, which leads to infection. Most infections of the sinuses are caused by a virus.  A sinus infection can develop quickly. It can last for up to 4 weeks (acute) or for more than 12 weeks (chronic). A sinus infection often develops after a cold.  What are the causes?  This condition is caused by anything that creates swelling in the sinuses or stops mucus from draining. This includes:  Allergies.  Asthma.  Infection from bacteria or viruses.  Deformities or blockages in your nose or sinuses.  Abnormal growths in the nose (nasal polyps).  Pollutants, such as chemicals or irritants in the air.  Infection from fungi. This is rare.  What increases the risk?  You are more likely to develop this condition if you:  Have a weak body defense system (immune system).  Do a lot of swimming or diving.  Overuse nasal sprays.  Smoke.  What are the signs or symptoms?  The main symptoms of this condition are pain and a feeling of pressure around the affected sinuses. Other symptoms include:  Stuffy nose or congestion that makes it difficult to breathe through your nose.  Thick yellow or greenish drainage from your nose.  Tenderness, swelling, and warmth over the affected sinuses.  A cough that may get worse at  night.  Decreased sense of smell and taste.  Extra mucus that collects in the throat or the back of the nose (postnasal drip) causing a sore throat or bad breath.  Tiredness (fatigue).  Fever.  How is this diagnosed?  This condition is diagnosed based on:  Your symptoms.  Your medical history.  A physical exam.  Tests to find out if your condition is acute or chronic. This may include:  Checking your nose for nasal polyps.  Viewing your sinuses using a device that has a light (endoscope).  Testing for allergies or bacteria.  Imaging tests, such as an MRI or CT scan.  In rare cases, a bone biopsy may be done to rule out more serious types of fungal sinus disease.  How is this treated?  Treatment for a sinus infection depends on the cause and whether your condition is chronic or acute.  If caused by a virus, your symptoms should go away on their own within 10 days. You may be given medicines to relieve symptoms. They include:  Medicines that shrink swollen nasal passages (decongestants).  A spray that eases inflammation of the nostrils (topical intranasal corticosteroids).  Rinses that help get rid of thick mucus in your nose (nasal saline washes).  Medicines that treat allergies (antihistamines).  Over-the-counter pain relievers.  If caused by bacteria, your health care provider may recommend waiting to see if your symptoms improve. Most bacterial infections will get better without antibiotic medicine. You may be given antibiotics if you have:  A severe infection.  A weak immune system.  If caused by narrow nasal passages or nasal polyps, surgery may be needed.  Follow these instructions at home:  Medicines  Take, use, or apply over-the-counter and prescription medicines only as told by your health care provider. These may include nasal sprays.  If you were prescribed an antibiotic medicine, take it as told by your health care provider. Do not stop taking the antibiotic even if you start to feel better.  Hydrate and  humidify    Drink enough fluid to keep your urine pale yellow. Staying hydrated will help to thin your mucus.  Use a cool mist humidifier to keep the humidity level in your home above 50%.  Inhale steam for 10-15 minutes, 3-4 times a day, or as told by your health care provider. You can do this in the bathroom while a hot shower is running.  Limit your exposure to cool or dry air.  Rest  Rest as much as possible.  Sleep with your head raised (elevated).  Make sure you get enough sleep each night.  General instructions    Apply a warm, moist washcloth to your face 3-4 times a day or as told by your health care provider. This will help with discomfort.  Use nasal saline washes as often as told by your health care provider.  Wash your hands often with soap and water to reduce your exposure to germs. If soap and water are not available, use hand .  Do not smoke. Avoid being around people who are smoking (secondhand smoke).  Keep all follow-up visits. This is important.  Contact a health care provider if:  You have a fever.  Your symptoms get worse.  Your symptoms do not improve within 10 days.  Get help right away if:  You have a severe headache.  You have persistent vomiting.  You have severe pain or swelling around your face or eyes.  You have vision problems.  You develop confusion.  Your neck is stiff.  You have trouble breathing.  These symptoms may be an emergency. Get help right away. Call 911.  Do not wait to see if the symptoms will go away.  Do not drive yourself to the hospital.  Summary  A sinus infection is soreness and inflammation of your sinuses. Sinuses are hollow spaces in the bones around your face.  This condition is caused by nasal tissues that become inflamed or swollen. The swelling traps or blocks the flow of mucus. This allows bacteria, viruses, and fungi to grow, which leads to infection.  If you were prescribed an antibiotic medicine, take it as told by your health care provider. Do  not stop taking the antibiotic even if you start to feel better.  Keep all follow-up visits. This is important.  This information is not intended to replace advice given to you by your health care provider. Make sure you discuss any questions you have with your health care provider.  Document Revised: 11/22/2022 Document Reviewed: 11/22/2022  Elsevier Patient Education © 2024 [employer]

## 2024-06-19 ENCOUNTER — OFFICE VISIT (OUTPATIENT)
Dept: FAMILY MEDICINE CLINIC | Facility: CLINIC | Age: 59
End: 2024-06-19
Payer: OTHER GOVERNMENT

## 2024-06-19 VITALS
TEMPERATURE: 97.1 F | WEIGHT: 175 LBS | HEIGHT: 60 IN | OXYGEN SATURATION: 97 % | HEART RATE: 97 BPM | BODY MASS INDEX: 34.36 KG/M2

## 2024-06-19 DIAGNOSIS — H69.91 EUSTACHIAN TUBE DISORDER, RIGHT: Primary | ICD-10-CM

## 2024-06-19 PROCEDURE — 99213 OFFICE O/P EST LOW 20 MIN: CPT | Performed by: FAMILY MEDICINE

## 2024-06-19 RX ORDER — FLUTICASONE PROPIONATE 50 MCG
2 SPRAY, SUSPENSION (ML) NASAL DAILY
Qty: 18.2 ML | Refills: 0 | Status: SHIPPED | OUTPATIENT
Start: 2024-06-19

## 2024-06-19 NOTE — PROGRESS NOTES
"Chief Complaint    Ear Fullness (RT ear fullness.  Was seen at [facility] for extreme ear pain 06/12 and was started on antibiotic.  Has finished but continues to have \"fullness\" in ear)    Subjective      Fiona Portillo presents to [facility]    1.) CONGESTION FOR RIGHT EAR : Recent evaluation per urgent care.  Diagnosed with right otitis media.  Placed on 7 days of BID amoxicillin.  Presents reporting fullness of right ear.  No pain or drainage.    Objective     Vital Signs:     Pulse 97   Temp 97.1 °F (36.2 °C) (Temporal)   Ht 152.4 cm (60\")   Wt 79.4 kg (175 lb)   SpO2 97%   BMI 34.18 kg/m²       Physical Exam  Vitals reviewed.   Constitutional:       General: She is not in acute distress.     Appearance: Normal appearance. She is well-developed.   HENT:      Head: Normocephalic and atraumatic.      Right Ear: Hearing and external ear normal. A middle ear effusion is present. Tympanic membrane is not erythematous or bulging.      Left Ear: Hearing and external ear normal.      Nose: Nose normal.   Eyes:      General: Lids are normal.         Right eye: No discharge.         Left eye: No discharge.      Conjunctiva/sclera: Conjunctivae normal.   Pulmonary:      Effort: Pulmonary effort is normal.   Abdominal:      General: There is no distension.   Musculoskeletal:         General: No swelling.      Cervical back: Neck supple.   Skin:     Coloration: Skin is not jaundiced.      Findings: No erythema.   Neurological:      Mental Status: She is alert. Mental status is at baseline.   Psychiatric:         Mood and Affect: Mood and affect normal.         Thought Content: Thought content normal.     Assessment and Plan     Diagnoses and all orders for this visit:    1. Eustachian tube disorder, right (Primary)  Comments:  Recommendation for trial of fluticasone. Follow up if no relief.    Other orders  -     fluticasone (FLONASE) 50 MCG/ACT nasal spray; 2 sprays into the nostril(s) as " directed by provider Daily.  Dispense: 18.2 mL; Refill: 0    Follow Up     Return if symptoms worsen or fail to improve.    Patient was given instructions and counseling regarding her condition or for health maintenance advice. Please see specific information pulled into the AVS if appropriate.

## 2024-07-15 RX ORDER — ROSUVASTATIN CALCIUM 40 MG/1
TABLET, COATED ORAL
Qty: 90 TABLET | Refills: 0 | Status: SHIPPED | OUTPATIENT
Start: 2024-07-15

## 2024-07-26 NOTE — TELEPHONE ENCOUNTER
Caller: [facility] - [place] [phone] [facility] [phone] FX    Relationship: Pharmacy    Best call back number: [phone]     Requested Prescriptions:   Requested Prescriptions     Pending Prescriptions Disp Refills    glipizide (GLUCOTROL) 5 MG tablet 180 tablet 3     Sig: Take 2 tablets by mouth Daily.    buPROPion XL (WELLBUTRIN XL) 150 MG 24 hr tablet 90 tablet 3     Sig: Take 1 tablet by mouth Every Morning.        Pharmacy where request should be sent: [facility] - [place] [phone] [facility] [phone] FX     Last office visit with prescribing clinician: 6/19/2024   Last telemedicine visit with prescribing clinician: Visit date not found   Next office visit with prescribing clinician: Visit date not found     Additional details provided by patient:  N/A     Does the patient have less than a 3 day supply:  [x] Yes  [] No        Chavo Whitfield Rep   07/26/24 17:24 EDT

## 2024-07-27 RX ORDER — BUPROPION HYDROCHLORIDE 150 MG/1
150 TABLET ORAL EVERY MORNING
Qty: 90 TABLET | Refills: 3 | Status: SHIPPED | OUTPATIENT
Start: 2024-07-27

## 2024-07-27 RX ORDER — GLIPIZIDE 5 MG/1
10 TABLET ORAL DAILY
Qty: 180 TABLET | Refills: 3 | Status: SHIPPED | OUTPATIENT
Start: 2024-07-27

## 2024-10-04 RX ORDER — VENLAFAXINE HYDROCHLORIDE 75 MG/1
75 CAPSULE, EXTENDED RELEASE ORAL DAILY
Qty: 90 CAPSULE | Refills: 1 | Status: SHIPPED | OUTPATIENT
Start: 2024-10-04

## 2024-10-15 RX ORDER — ROSUVASTATIN CALCIUM 40 MG/1
TABLET, COATED ORAL
Qty: 90 TABLET | Refills: 3 | Status: SHIPPED | OUTPATIENT
Start: 2024-10-15

## 2024-10-18 ENCOUNTER — HOSPITAL ENCOUNTER (OUTPATIENT)
Dept: MAMMOGRAPHY | Facility: HOSPITAL | Age: 59
Discharge: HOME | End: 2024-10-18
Admitting: INTERNAL MEDICINE
Payer: OTHER GOVERNMENT

## 2024-10-18 DIAGNOSIS — Z12.31 SCREENING MAMMOGRAM, ENCOUNTER FOR: ICD-10-CM

## 2024-10-18 PROCEDURE — 77063 BREAST TOMOSYNTHESIS BI: CPT

## 2024-10-18 PROCEDURE — 77067 SCR MAMMO BI INCL CAD: CPT

## 2024-10-28 ENCOUNTER — HOSPITAL ENCOUNTER (OUTPATIENT)
Dept: BONE DENSITY | Facility: HOSPITAL | Age: 59
Discharge: HOME | End: 2024-10-28
Admitting: INTERNAL MEDICINE
Payer: OTHER GOVERNMENT

## 2024-10-28 DIAGNOSIS — M85.88 OSTEOPENIA OF LUMBAR SPINE: ICD-10-CM

## 2024-10-28 DIAGNOSIS — C50.411 MALIGNANT NEOPLASM OF UPPER-OUTER QUADRANT OF RIGHT BREAST IN FEMALE, ESTROGEN RECEPTOR POSITIVE: ICD-10-CM

## 2024-10-28 DIAGNOSIS — Z17.0 MALIGNANT NEOPLASM OF UPPER-OUTER QUADRANT OF RIGHT BREAST IN FEMALE, ESTROGEN RECEPTOR POSITIVE: ICD-10-CM

## 2024-10-28 PROCEDURE — 77080 DXA BONE DENSITY AXIAL: CPT

## 2024-11-13 RX ORDER — TELMISARTAN 40 MG/1
TABLET ORAL
Qty: 90 TABLET | Refills: 3 | Status: SHIPPED | OUTPATIENT
Start: 2024-11-13

## 2024-11-25 RX ORDER — CANAGLIFLOZIN 100 MG/1
1 TABLET, FILM COATED ORAL DAILY
Qty: 90 TABLET | Refills: 3 | Status: SHIPPED | OUTPATIENT
Start: 2024-11-25

## 2024-12-04 ENCOUNTER — LAB (OUTPATIENT)
Dept: ONCOLOGY | Facility: HOSPITAL | Age: 59
End: 2024-12-04
Payer: OTHER GOVERNMENT

## 2024-12-04 ENCOUNTER — HOSPITAL ENCOUNTER (OUTPATIENT)
Dept: ONCOLOGY | Facility: HOSPITAL | Age: 59
Discharge: HOME | End: 2024-12-04
Payer: OTHER GOVERNMENT

## 2024-12-04 ENCOUNTER — OFFICE VISIT (OUTPATIENT)
Dept: ONCOLOGY | Facility: HOSPITAL | Age: 59
End: 2024-12-04
Payer: OTHER GOVERNMENT

## 2024-12-04 VITALS
HEIGHT: 60 IN | SYSTOLIC BLOOD PRESSURE: 121 MMHG | HEART RATE: 88 BPM | WEIGHT: 175.8 LBS | DIASTOLIC BLOOD PRESSURE: 72 MMHG | OXYGEN SATURATION: 99 % | RESPIRATION RATE: 20 BRPM | BODY MASS INDEX: 34.51 KG/M2 | TEMPERATURE: 97.2 F

## 2024-12-04 DIAGNOSIS — M85.88 OSTEOPENIA OF LUMBAR SPINE: ICD-10-CM

## 2024-12-04 DIAGNOSIS — Z17.0 MALIGNANT NEOPLASM OF UPPER-OUTER QUADRANT OF RIGHT BREAST IN FEMALE, ESTROGEN RECEPTOR POSITIVE: ICD-10-CM

## 2024-12-04 DIAGNOSIS — C50.411 MALIGNANT NEOPLASM OF UPPER-OUTER QUADRANT OF RIGHT BREAST IN FEMALE, ESTROGEN RECEPTOR POSITIVE: Primary | ICD-10-CM

## 2024-12-04 DIAGNOSIS — N18.30 STAGE 3 CHRONIC KIDNEY DISEASE, UNSPECIFIED WHETHER STAGE 3A OR 3B CKD: ICD-10-CM

## 2024-12-04 DIAGNOSIS — M85.88 OSTEOPENIA OF LUMBAR SPINE: Primary | ICD-10-CM

## 2024-12-04 DIAGNOSIS — C50.411 MALIGNANT NEOPLASM OF UPPER-OUTER QUADRANT OF RIGHT BREAST IN FEMALE, ESTROGEN RECEPTOR POSITIVE: ICD-10-CM

## 2024-12-04 DIAGNOSIS — Z17.0 MALIGNANT NEOPLASM OF UPPER-OUTER QUADRANT OF RIGHT BREAST IN FEMALE, ESTROGEN RECEPTOR POSITIVE: Primary | ICD-10-CM

## 2024-12-04 LAB
ALBUMIN SERPL-MCNC: 4.5 G/DL (ref 3.5–5.2)
ALBUMIN/GLOB SERPL: 1.6 G/DL
ALP SERPL-CCNC: 61 U/L (ref 39–117)
ALT SERPL W P-5'-P-CCNC: 24 U/L (ref 1–33)
ANION GAP SERPL CALCULATED.3IONS-SCNC: 12.5 MMOL/L (ref 5–15)
AST SERPL-CCNC: 20 U/L (ref 1–32)
BASOPHILS # BLD AUTO: 0.05 10*3/MM3 (ref 0–0.2)
BASOPHILS NFR BLD AUTO: 0.7 % (ref 0–1.5)
BILIRUB SERPL-MCNC: 0.5 MG/DL (ref 0–1.2)
BUN SERPL-MCNC: 27 MG/DL (ref 6–20)
BUN/CREAT SERPL: 14.7 (ref 7–25)
CALCIUM SPEC-SCNC: 10.2 MG/DL (ref 8.6–10.5)
CHLORIDE SERPL-SCNC: 97 MMOL/L (ref 98–107)
CO2 SERPL-SCNC: 26.5 MMOL/L (ref 22–29)
CREAT SERPL-MCNC: 1.84 MG/DL (ref 0.57–1)
DEPRECATED RDW RBC AUTO: 43.3 FL (ref 37–54)
EGFRCR SERPLBLD CKD-EPI 2021: 31.5 ML/MIN/1.73
EOSINOPHIL # BLD AUTO: 0.27 10*3/MM3 (ref 0–0.4)
EOSINOPHIL NFR BLD AUTO: 3.6 % (ref 0.3–6.2)
ERYTHROCYTE [DISTWIDTH] IN BLOOD BY AUTOMATED COUNT: 13.8 % (ref 12.3–15.4)
GLOBULIN UR ELPH-MCNC: 2.8 GM/DL
GLUCOSE SERPL-MCNC: 287 MG/DL (ref 65–99)
HCT VFR BLD AUTO: 41.1 % (ref 34–46.6)
HGB BLD-MCNC: 13.5 G/DL (ref 12–15.9)
IMM GRANULOCYTES # BLD AUTO: 0.04 10*3/MM3 (ref 0–0.05)
IMM GRANULOCYTES NFR BLD AUTO: 0.5 % (ref 0–0.5)
LYMPHOCYTES # BLD AUTO: 1.71 10*3/MM3 (ref 0.7–3.1)
LYMPHOCYTES NFR BLD AUTO: 22.6 % (ref 19.6–45.3)
MAGNESIUM SERPL-MCNC: 2.1 MG/DL (ref 1.6–2.6)
MCH RBC QN AUTO: 28.3 PG (ref 26.6–33)
MCHC RBC AUTO-ENTMCNC: 32.8 G/DL (ref 31.5–35.7)
MCV RBC AUTO: 86.2 FL (ref 79–97)
MONOCYTES # BLD AUTO: 0.57 10*3/MM3 (ref 0.1–0.9)
MONOCYTES NFR BLD AUTO: 7.5 % (ref 5–12)
NEUTROPHILS NFR BLD AUTO: 4.94 10*3/MM3 (ref 1.7–7)
NEUTROPHILS NFR BLD AUTO: 65.1 % (ref 42.7–76)
NRBC BLD AUTO-RTO: 0 /100 WBC (ref 0–0.2)
PHOSPHATE SERPL-MCNC: 4.5 MG/DL (ref 2.5–4.5)
PLATELET # BLD AUTO: 229 10*3/MM3 (ref 140–450)
PMV BLD AUTO: 10.7 FL (ref 6–12)
POTASSIUM SERPL-SCNC: 4.6 MMOL/L (ref 3.5–5.2)
PROT SERPL-MCNC: 7.3 G/DL (ref 6–8.5)
RBC # BLD AUTO: 4.77 10*6/MM3 (ref 3.77–5.28)
SODIUM SERPL-SCNC: 136 MMOL/L (ref 136–145)
WBC NRBC COR # BLD AUTO: 7.58 10*3/MM3 (ref 3.4–10.8)

## 2024-12-04 PROCEDURE — 96372 THER/PROPH/DIAG INJ SC/IM: CPT

## 2024-12-04 PROCEDURE — 25010000002 DENOSUMAB 60 MG/ML SOLUTION PREFILLED SYRINGE: Performed by: INTERNAL MEDICINE

## 2024-12-04 PROCEDURE — 84100 ASSAY OF PHOSPHORUS: CPT

## 2024-12-04 PROCEDURE — 83735 ASSAY OF MAGNESIUM: CPT

## 2024-12-04 PROCEDURE — 99214 OFFICE O/P EST MOD 30 MIN: CPT | Performed by: INTERNAL MEDICINE

## 2024-12-04 PROCEDURE — 36415 COLL VENOUS BLD VENIPUNCTURE: CPT

## 2024-12-04 PROCEDURE — 85025 COMPLETE CBC W/AUTO DIFF WBC: CPT

## 2024-12-04 PROCEDURE — 80053 COMPREHEN METABOLIC PANEL: CPT

## 2024-12-04 RX ADMIN — DENOSUMAB 60 MG: 60 INJECTION SUBCUTANEOUS at 14:59

## 2024-12-04 NOTE — PROGRESS NOTES
Chief Complaint  Breast Cancer      Jesica Alejandra, DO    Subjective          Fiona Portillo presents to [facility] for follow-up of breast cancer and osteopenia.  She is on surveillance for the former and Prolia for the latter.  She takes calcium vitamin D daily.  She is trying exercise.  She denies any masses, adenopathy, usual aches or pains.  No dental pain, jaw issues or mouth sores.  She notes adequate appetite and energy level.    Oncology/Hematology History Overview Note   Right breast invasive ductal carcinoma ER/NC+  HER2+            Clinical Staging      Stage IA ([id number])            Treatments      Chemotherapy      12/30/15 thru 4/20/16 completed 6C TCHP--completed 1yr Herceptin      Arimidex   began. 6/25/16    completed 2022     3/14/18 Prolia initiated        Radiation Therapy      5/10/16 thru 6/24/16 completed [id number] right breast        Surgeries      11/23/2015 right sided lumpectomy and sentinel lymph node biopsy.         Malignant neoplasm of upper-outer quadrant of right breast in female, estrogen receptor positive   7/1/2021 Initial Diagnosis    Breast CA (CMS/HCC)     7/6/2021 Cancer Staged    Staging form: Breast, AJCC 8th Edition  - Clinical: cT1c, cN0, cM0, ER+, NC+, HER2+ - Signed by Champ Estrada MD on 7/6/2021           Current Outpatient Medications on File Prior to Visit   Medication Sig Dispense Refill    buPROPion XL (WELLBUTRIN XL) 150 MG 24 hr tablet Take 1 tablet by mouth Every Morning. 90 tablet 3    fluticasone (FLONASE) 50 MCG/ACT nasal spray 2 sprays into the nostril(s) as directed by provider Daily. 18.2 mL 0    glipizide (GLUCOTROL) 5 MG tablet Take 2 tablets by mouth Daily. 180 tablet 3    glucose blood (FREESTYLE LITE) test strip USE AS INSTRUCTED 100 each 4    Invokana 100 MG tablet tablet TAKE 1 TABLET DAILY 90 tablet 3    Lancets (freestyle) lancets USE 1 LANCET DAILY AS INSTRUCTED 200 each 3    levothyroxine (Synthroid) 125  MCG tablet TAKE 1 TABLET DAILY 30 MINUTES BEFORE BREAKFAST ON AN EMPTY STOMACH 90 tablet 1    rosuvastatin (CRESTOR) 40 MG tablet TAKE 1 TABLET DAILY 90 tablet 3    telmisartan (MICARDIS) 40 MG tablet TAKE 1 TABLET DAILY 90 tablet 3    venlafaxine XR (EFFEXOR-XR) 75 MG 24 hr capsule TAKE 1 CAPSULE DAILY 90 capsule 1     No current facility-administered medications on file prior to visit.       Allergies   Allergen Reactions    Lisinopril Cough and Unknown - Low Severity     Past Medical History:   Diagnosis Date    Depression     Essential (primary) hypertension 2/19/2018    High cholesterol     Hx of radiation therapy     Malignant neoplasm of upper-outer quadrant of right breast in female, estrogen receptor positive 07/01/2021    Osteopenia of lumbar spine 07/06/2021     Past Surgical History:   Procedure Laterality Date    BREAST LUMPECTOMY      right    LEFT VENTRICULAR ASSIST DEVICE      OTHER SURGICAL HISTORY      BIOPSY     Social History     Socioeconomic History    Marital status:     Number of children: 2   Tobacco Use    Smoking status: Never     Passive exposure: Never    Smokeless tobacco: Never   Vaping Use    Vaping status: Never Used   Substance and Sexual Activity    Alcohol use: Never    Drug use: Never    Sexual activity: Defer     Family History   Problem Relation Age of Onset    Breast cancer Other     Breast cancer Mother        Objective   Physical Exam  Vitals reviewed. Exam conducted with a chaperone present.   Cardiovascular:      Rate and Rhythm: Normal rate and regular rhythm.      Heart sounds: Normal heart sounds. No murmur heard.     No gallop.   Pulmonary:      Effort: Pulmonary effort is normal.      Breath sounds: Normal breath sounds.   Chest:   Breasts:     Right: Skin change (See diagram) present.      Left: Normal.       Abdominal:      General: Bowel sounds are normal.   Musculoskeletal:      Right lower leg: No edema.      Left lower leg: No edema.   Lymphadenopathy:     "  Cervical: No cervical adenopathy.      Upper Body:      Right upper body: No supraclavicular or axillary adenopathy.      Left upper body: No supraclavicular or axillary adenopathy.   Psychiatric:         Mood and Affect: Mood normal.         Behavior: Behavior normal.         Vitals:    12/04/24 1419   BP: 121/72   Pulse: 88   Resp: 20   Temp: 97.2 °F (36.2 °C)   TempSrc: Temporal   SpO2: 99%   Weight: 79.7 kg (175 lb 12.8 oz)   Height: 152.4 cm (60\")   PainSc: 0-No pain     ECOG score: 0         PHQ-9 Total Score:                    Result Review :   The following data was reviewed by: Champ Estrada MD on 12/04/2024:  Lab Results   Component Value Date    HGB 13.5 12/04/2024    HCT 41.1 12/04/2024    MCV 86.2 12/04/2024     12/04/2024    WBC 7.58 12/04/2024    NEUTROABS 4.94 12/04/2024    LYMPHSABS 1.71 12/04/2024    MONOSABS 0.57 12/04/2024    EOSABS 0.27 12/04/2024    BASOSABS 0.05 12/04/2024     Lab Results   Component Value Date    GLUCOSE 287 (H) 12/04/2024    BUN 27 (H) 12/04/2024    CREATININE 1.84 (H) 12/04/2024     12/04/2024    K 4.6 12/04/2024    CL 97 (L) 12/04/2024    CO2 26.5 12/04/2024    CALCIUM 10.2 12/04/2024    PROTEINTOT 7.3 12/04/2024    ALBUMIN 4.5 12/04/2024    BILITOT 0.5 12/04/2024    ALKPHOS 61 12/04/2024    AST 20 12/04/2024    ALT 24 12/04/2024     Lab Results   Component Value Date    MG 2.1 12/04/2024    PHOS 4.5 12/04/2024    FREET4 1.8 03/02/2021    TSH 3.120 01/16/2023     No results found for: \"IRON\", \"LABIRON\", \"TRANSFERRIN\", \"TIBC\"  No results found for: \"LDH\", \"FERRITIN\", \"[id number]\", \"FOLATE\"  No results found for: \"PSA\", \"CEA\", \"AFP\", \"\", \"\"    Data reviewed : Radiologic studies mammogram and DEXA scan reviewed       Assessment and Plan    Diagnoses and all orders for this visit:    1. Malignant neoplasm of upper-outer quadrant of right breast in female, estrogen receptor positive (Primary)  Assessment & Plan:  Status post treatments as outlined. "  Patient is doing well.  I see no evidence of disease recurrence by her history, physical examination or recent mammogram.      2. Osteopenia of lumbar spine  Assessment & Plan:  Patient is on Prolia.  She has been on medication now for about 6 years.  Tolerating well.  DEXA scan shows osteopenia but her FRAX score is low.  Electrolytes are adequate.  I will proceed with Prolia today.  Given the low FRAX score, I will plan for Reclast at her next visit for 1 dose and then she can monitor with her PCP.  Continue calcium, vitamin D and routine exercise.      3. Stage 3 chronic kidney disease, unspecified whether stage 3a or 3b CKD  Assessment & Plan:  Creatinine is little more elevated today than her baseline.  Clinically she is feeling well.  She should follow-up with PCP or nephrologist.      Other orders  -     Cancel: denosumab (PROLIA) syringe 60 mg            Patient Follow Up: 6 months    Patient was given instructions and counseling regarding her condition or for health maintenance advice. Please see specific information pulled into the AVS if appropriate.     Champ Estrada MD    12/4/2024

## 2024-12-04 NOTE — ASSESSMENT & PLAN NOTE
Creatinine is little more elevated today than her baseline.  Clinically she is feeling well.  She should follow-up with PCP or nephrologist.

## 2024-12-04 NOTE — ASSESSMENT & PLAN NOTE
Status post treatments as outlined.  Patient is doing well.  I see no evidence of disease recurrence by her history, physical examination or recent mammogram.

## 2024-12-04 NOTE — ASSESSMENT & PLAN NOTE
Patient is on Prolia.  She has been on medication now for about 6 years.  Tolerating well.  DEXA scan shows osteopenia but her FRAX score is low.  Electrolytes are adequate.  I will proceed with Prolia today.  Given the low FRAX score, I will plan for Reclast at her next visit for 1 dose and then she can monitor with her PCP.  Continue calcium, vitamin D and routine exercise.

## 2024-12-06 ENCOUNTER — TELEPHONE (OUTPATIENT)
Dept: ONCOLOGY | Facility: HOSPITAL | Age: 59
End: 2024-12-06
Payer: OTHER GOVERNMENT

## 2024-12-26 ENCOUNTER — OFFICE VISIT (OUTPATIENT)
Dept: FAMILY MEDICINE CLINIC | Facility: CLINIC | Age: 59
End: 2024-12-26
Payer: OTHER GOVERNMENT

## 2024-12-26 VITALS
DIASTOLIC BLOOD PRESSURE: 76 MMHG | WEIGHT: 173.5 LBS | TEMPERATURE: 97.2 F | SYSTOLIC BLOOD PRESSURE: 118 MMHG | BODY MASS INDEX: 33.88 KG/M2 | OXYGEN SATURATION: 96 % | HEART RATE: 111 BPM

## 2024-12-26 DIAGNOSIS — J06.9 UPPER RESPIRATORY TRACT INFECTION, UNSPECIFIED TYPE: Primary | ICD-10-CM

## 2024-12-26 DIAGNOSIS — H65.03 BILATERAL ACUTE SEROUS OTITIS MEDIA, RECURRENCE NOT SPECIFIED: ICD-10-CM

## 2024-12-26 PROCEDURE — 99213 OFFICE O/P EST LOW 20 MIN: CPT | Performed by: FAMILY MEDICINE

## 2024-12-26 RX ORDER — AMOXICILLIN 500 MG/1
500 CAPSULE ORAL 2 TIMES DAILY
Qty: 20 CAPSULE | Refills: 0 | Status: SHIPPED | OUTPATIENT
Start: 2024-12-26 | End: 2025-01-05

## 2024-12-26 NOTE — PROGRESS NOTES
Chief Complaint    Earache (Left ear pain, used some otc eardrops last night and this morning when she removed the cotton ball it had a pink tint to it. ) and Cough (Nasal congestion for 1 week )    Subjective      Fiona Portillo presents to [facility]    1.) ACUTE ILLNESS : Onset - 1 week ago. Initial nasal/sinus congestion. No fevers or chills reported. Patient is also reporting left ear pain. ? Bleeding. (+) Cough. No SOA. No documented hx of lung disease.     Objective     Vital Signs:     /76 (BP Location: Left arm, Patient Position: Sitting)   Pulse 111   Temp 97.2 °F (36.2 °C)   Wt 78.7 kg (173 lb 8 oz)   SpO2 96%   BMI 33.88 kg/m²       Physical Exam  Vitals reviewed.   Constitutional:       General: She is not in acute distress.     Appearance: Normal appearance. She is well-developed.   HENT:      Head: Normocephalic and atraumatic.      Right Ear: Hearing and external ear normal. A middle ear effusion is present. Tympanic membrane is erythematous and bulging.      Left Ear: Hearing and external ear normal. A middle ear effusion is present. Tympanic membrane is erythematous and bulging.      Nose: Nose normal. Congestion present.      Mouth/Throat:      Pharynx: No oropharyngeal exudate.   Eyes:      General: Lids are normal.         Right eye: No discharge.         Left eye: No discharge.      Conjunctiva/sclera: Conjunctivae normal.   Pulmonary:      Effort: Pulmonary effort is normal.      Breath sounds: Normal breath sounds.   Abdominal:      General: There is no distension.   Musculoskeletal:         General: No swelling.      Cervical back: Neck supple.   Skin:     Coloration: Skin is not jaundiced.      Findings: No erythema.   Neurological:      Mental Status: She is alert. Mental status is at baseline.   Psychiatric:         Mood and Affect: Mood and affect normal.         Thought Content: Thought content normal.     Assessment and Plan     Diagnoses  and all orders for this visit:    1. Upper respiratory tract infection, unspecified type (Primary)  Comments:  Recommend adequate fluids and rest.  Acetaminophen as needed.  Call with any alarming symptoms.    2. Bilateral acute serous otitis media, recurrence not specified  Comments:  Start antibiotic.    Other orders  -     amoxicillin (AMOXIL) 500 MG capsule; Take 1 capsule by mouth 2 (Two) Times a Day for 10 days.  Dispense: 20 capsule; Refill: 0    Follow Up     Return if symptoms worsen or fail to improve.    Patient was given instructions and counseling regarding her condition or for health maintenance advice. Please see specific information pulled into the AVS if appropriate.

## 2025-01-13 RX ORDER — LEVOTHYROXINE SODIUM 125 UG/1
TABLET ORAL
Qty: 90 TABLET | Refills: 3 | Status: SHIPPED | OUTPATIENT
Start: 2025-01-13

## 2025-04-02 RX ORDER — VENLAFAXINE HYDROCHLORIDE 75 MG/1
75 CAPSULE, EXTENDED RELEASE ORAL DAILY
Qty: 90 CAPSULE | Refills: 0 | Status: SHIPPED | OUTPATIENT
Start: 2025-04-02

## 2025-06-04 ENCOUNTER — OFFICE VISIT (OUTPATIENT)
Dept: ONCOLOGY | Facility: HOSPITAL | Age: 60
End: 2025-06-04
Payer: OTHER GOVERNMENT

## 2025-06-04 ENCOUNTER — HOSPITAL ENCOUNTER (OUTPATIENT)
Dept: ONCOLOGY | Facility: HOSPITAL | Age: 60
Discharge: HOME | End: 2025-06-04
Payer: OTHER GOVERNMENT

## 2025-06-04 VITALS
BODY MASS INDEX: 33.41 KG/M2 | TEMPERATURE: 97.3 F | OXYGEN SATURATION: 97 % | DIASTOLIC BLOOD PRESSURE: 81 MMHG | HEART RATE: 92 BPM | WEIGHT: 170.19 LBS | HEIGHT: 60 IN | RESPIRATION RATE: 18 BRPM | SYSTOLIC BLOOD PRESSURE: 120 MMHG

## 2025-06-04 VITALS
SYSTOLIC BLOOD PRESSURE: 120 MMHG | HEART RATE: 92 BPM | TEMPERATURE: 97.3 F | RESPIRATION RATE: 18 BRPM | DIASTOLIC BLOOD PRESSURE: 81 MMHG

## 2025-06-04 DIAGNOSIS — M85.88 OSTEOPENIA OF LUMBAR SPINE: ICD-10-CM

## 2025-06-04 DIAGNOSIS — M85.88 OSTEOPENIA OF LUMBAR SPINE: Primary | ICD-10-CM

## 2025-06-04 DIAGNOSIS — Z85.3 HISTORY OF BREAST CANCER: ICD-10-CM

## 2025-06-04 DIAGNOSIS — C50.411 MALIGNANT NEOPLASM OF UPPER-OUTER QUADRANT OF RIGHT BREAST IN FEMALE, ESTROGEN RECEPTOR POSITIVE: Primary | ICD-10-CM

## 2025-06-04 DIAGNOSIS — Z17.0 MALIGNANT NEOPLASM OF UPPER-OUTER QUADRANT OF RIGHT BREAST IN FEMALE, ESTROGEN RECEPTOR POSITIVE: Primary | ICD-10-CM

## 2025-06-04 LAB
ALBUMIN SERPL-MCNC: 4.9 G/DL (ref 3.5–5.2)
ALBUMIN/GLOB SERPL: 1.6 G/DL
ALP SERPL-CCNC: 60 U/L (ref 39–117)
ALT SERPL W P-5'-P-CCNC: 25 U/L (ref 1–33)
ANION GAP SERPL CALCULATED.3IONS-SCNC: 14 MMOL/L (ref 5–15)
AST SERPL-CCNC: 22 U/L (ref 1–32)
BASOPHILS # BLD AUTO: 0.06 10*3/MM3 (ref 0–0.2)
BASOPHILS NFR BLD AUTO: 0.6 % (ref 0–1.5)
BILIRUB SERPL-MCNC: 0.6 MG/DL (ref 0–1.2)
BUN SERPL-MCNC: 20.5 MG/DL (ref 6–20)
BUN/CREAT SERPL: 12.5 (ref 7–25)
CALCIUM SPEC-SCNC: 10.5 MG/DL (ref 8.6–10.5)
CHLORIDE SERPL-SCNC: 101 MMOL/L (ref 98–107)
CO2 SERPL-SCNC: 24 MMOL/L (ref 22–29)
CREAT SERPL-MCNC: 1.64 MG/DL (ref 0.57–1)
DEPRECATED RDW RBC AUTO: 43.4 FL (ref 37–54)
EGFRCR SERPLBLD CKD-EPI 2021: 35.9 ML/MIN/1.73
EOSINOPHIL # BLD AUTO: 0.37 10*3/MM3 (ref 0–0.4)
EOSINOPHIL NFR BLD AUTO: 3.7 % (ref 0.3–6.2)
ERYTHROCYTE [DISTWIDTH] IN BLOOD BY AUTOMATED COUNT: 13.6 % (ref 12.3–15.4)
GLOBULIN UR ELPH-MCNC: 3 GM/DL
GLUCOSE SERPL-MCNC: 160 MG/DL (ref 65–99)
HCT VFR BLD AUTO: 45.9 % (ref 34–46.6)
HGB BLD-MCNC: 14.9 G/DL (ref 12–15.9)
HOLD SPECIMEN: NORMAL
IMM GRANULOCYTES # BLD AUTO: 0.06 10*3/MM3 (ref 0–0.05)
IMM GRANULOCYTES NFR BLD AUTO: 0.6 % (ref 0–0.5)
LYMPHOCYTES # BLD AUTO: 2.76 10*3/MM3 (ref 0.7–3.1)
LYMPHOCYTES NFR BLD AUTO: 27.7 % (ref 19.6–45.3)
MAGNESIUM SERPL-MCNC: 2.1 MG/DL (ref 1.6–2.6)
MCH RBC QN AUTO: 28.3 PG (ref 26.6–33)
MCHC RBC AUTO-ENTMCNC: 32.5 G/DL (ref 31.5–35.7)
MCV RBC AUTO: 87.1 FL (ref 79–97)
MONOCYTES # BLD AUTO: 0.93 10*3/MM3 (ref 0.1–0.9)
MONOCYTES NFR BLD AUTO: 9.3 % (ref 5–12)
NEUTROPHILS NFR BLD AUTO: 5.8 10*3/MM3 (ref 1.7–7)
NEUTROPHILS NFR BLD AUTO: 58.1 % (ref 42.7–76)
NRBC BLD AUTO-RTO: 0 /100 WBC (ref 0–0.2)
PHOSPHATE SERPL-MCNC: 3.8 MG/DL (ref 2.5–4.5)
PLATELET # BLD AUTO: 276 10*3/MM3 (ref 140–450)
PMV BLD AUTO: 10.5 FL (ref 6–12)
POTASSIUM SERPL-SCNC: 4.1 MMOL/L (ref 3.5–5.2)
PROT SERPL-MCNC: 7.9 G/DL (ref 6–8.5)
RBC # BLD AUTO: 5.27 10*6/MM3 (ref 3.77–5.28)
SODIUM SERPL-SCNC: 139 MMOL/L (ref 136–145)
WBC NRBC COR # BLD AUTO: 9.98 10*3/MM3 (ref 3.4–10.8)

## 2025-06-04 PROCEDURE — 84100 ASSAY OF PHOSPHORUS: CPT | Performed by: INTERNAL MEDICINE

## 2025-06-04 PROCEDURE — 25810000003 SODIUM CHLORIDE 0.9 % SOLUTION: Performed by: INTERNAL MEDICINE

## 2025-06-04 PROCEDURE — 85025 COMPLETE CBC W/AUTO DIFF WBC: CPT | Performed by: INTERNAL MEDICINE

## 2025-06-04 PROCEDURE — 96374 THER/PROPH/DIAG INJ IV PUSH: CPT

## 2025-06-04 PROCEDURE — 83735 ASSAY OF MAGNESIUM: CPT | Performed by: INTERNAL MEDICINE

## 2025-06-04 PROCEDURE — 80053 COMPREHEN METABOLIC PANEL: CPT | Performed by: INTERNAL MEDICINE

## 2025-06-04 PROCEDURE — 25010000002 ZOLEDRONIC ACID 5 MG/100ML SOLUTION: Performed by: INTERNAL MEDICINE

## 2025-06-04 RX ORDER — SODIUM CHLORIDE 9 MG/ML
20 INJECTION, SOLUTION INTRAVENOUS ONCE
Status: CANCELLED | OUTPATIENT
Start: 2025-06-04

## 2025-06-04 RX ORDER — ZOLEDRONIC ACID 0.05 MG/ML
5 INJECTION, SOLUTION INTRAVENOUS ONCE
Status: CANCELLED | OUTPATIENT
Start: 2025-06-04

## 2025-06-04 RX ORDER — SODIUM CHLORIDE 9 MG/ML
20 INJECTION, SOLUTION INTRAVENOUS ONCE
Status: COMPLETED | OUTPATIENT
Start: 2025-06-04 | End: 2025-06-04

## 2025-06-04 RX ORDER — ZOLEDRONIC ACID 0.05 MG/ML
5 INJECTION, SOLUTION INTRAVENOUS ONCE
Status: COMPLETED | OUTPATIENT
Start: 2025-06-04 | End: 2025-06-04

## 2025-06-04 RX ADMIN — SODIUM CHLORIDE 20 ML/HR: 9 INJECTION, SOLUTION INTRAVENOUS at 14:20

## 2025-06-04 RX ADMIN — ZOLEDRONIC ACID 5 MG: 5 INJECTION INTRAVENOUS at 14:35

## 2025-06-04 NOTE — ASSESSMENT & PLAN NOTE
Status post treatments as outlined.  Clinically doing well.  I see no evidence of disease recurrence by her history or physical examination.  Follow-up in the fall with mammogram prior.

## 2025-06-04 NOTE — PROGRESS NOTES
Chief Complaint  BREAST CA      Jesica Alejandra,     Subjective          Fiona Portillo presents to [facility] for follow-up of her breast cancer ongoing treatment for her osteopenia.  She is now on observation having completed all breast cancer treatment.  She denies new masses, adenopathy, unusual aches or pains.  She has been on denosumab for some time with improvement in her bone density and presents today for Reclast.  She has no complaints today.    Oncology/Hematology History Overview Note   Right breast invasive ductal carcinoma ER/AK+  HER2+            Clinical Staging      Stage IA ([id number])            Treatments      Chemotherapy      12/30/15 thru 4/20/16 completed 6C TCHP--completed 1yr Herceptin      Arimidex   began. 6/25/16    completed 2022     3/14/18 Prolia initiated        Radiation Therapy      5/10/16 thru 6/24/16 completed [id number] right breast        Surgeries      11/23/2015 right sided lumpectomy and sentinel lymph node biopsy.         Malignant neoplasm of upper-outer quadrant of right breast in female, estrogen receptor positive   7/1/2021 Initial Diagnosis    Breast CA (CMS/HCC)     7/6/2021 Cancer Staged    Staging form: Breast, AJCC 8th Edition  - Clinical: cT1c, cN0, cM0, ER+, AK+, HER2+ - Signed by Champ Estrada MD on 7/6/2021           Current Outpatient Medications on File Prior to Visit   Medication Sig Dispense Refill    buPROPion XL (WELLBUTRIN XL) 150 MG 24 hr tablet Take 1 tablet by mouth Every Morning. 90 tablet 3    fluticasone (FLONASE) 50 MCG/ACT nasal spray 2 sprays into the nostril(s) as directed by provider Daily. 18.2 mL 0    glipizide (GLUCOTROL) 5 MG tablet Take 2 tablets by mouth Daily. 180 tablet 3    glucose blood (FREESTYLE LITE) test strip USE AS INSTRUCTED 100 each 4    Invokana 100 MG tablet tablet TAKE 1 TABLET DAILY 90 tablet 3    Lancets (freestyle) lancets USE 1 LANCET DAILY AS INSTRUCTED 200 each 3     levothyroxine (SYNTHROID, LEVOTHROID) 125 MCG tablet TAKE 1 TABLET DAILY 30 MINUTES BEFORE BREAKFAST ON AN EMPTY STOMACH 90 tablet 3    rosuvastatin (CRESTOR) 40 MG tablet TAKE 1 TABLET DAILY 90 tablet 3    telmisartan (MICARDIS) 40 MG tablet TAKE 1 TABLET DAILY 90 tablet 3    venlafaxine XR (EFFEXOR-XR) 75 MG 24 hr capsule TAKE 1 CAPSULE DAILY 90 capsule 0     No current facility-administered medications on file prior to visit.       Allergies   Allergen Reactions    Lisinopril Cough and Unknown - Low Severity     Past Medical History:   Diagnosis Date    Allergic     Seasonal    Depression     Diabetes mellitus 2012    Essential (primary) hypertension 02/19/2018    High cholesterol     Hx of radiation therapy     Malignant neoplasm of upper-outer quadrant of right breast in female, estrogen receptor positive 07/01/2021    Osteopenia of lumbar spine 07/06/2021     Past Surgical History:   Procedure Laterality Date    BREAST LUMPECTOMY      right    LEFT VENTRICULAR ASSIST DEVICE      OTHER SURGICAL HISTORY      BIOPSY     Social History     Socioeconomic History    Marital status:     Number of children: 2   Tobacco Use    Smoking status: Never     Passive exposure: Never    Smokeless tobacco: Never   Vaping Use    Vaping status: Never Used   Substance and Sexual Activity    Alcohol use: Never    Drug use: Never    Sexual activity: Not Currently     Birth control/protection: Tubal ligation     Family History   Problem Relation Age of Onset    Breast cancer Other     Breast cancer Mother        Objective   Physical Exam  Vitals reviewed. Exam conducted with a chaperone present.   Cardiovascular:      Rate and Rhythm: Normal rate and regular rhythm.      Heart sounds: Normal heart sounds. No murmur heard.     No gallop.   Pulmonary:      Effort: Pulmonary effort is normal.      Breath sounds: Normal breath sounds.   Abdominal:      General: Bowel sounds are normal.   Lymphadenopathy:      Cervical: No cervical  "adenopathy.   Psychiatric:         Mood and Affect: Mood normal.         Behavior: Behavior normal.         Vitals:    06/04/25 1304   BP: 120/81   Pulse: 92   Resp: 18   Temp: 97.3 °F (36.3 °C)   TempSrc: Temporal   SpO2: 97%   Weight: 77.2 kg (170 lb 3.1 oz)   Height: 152.4 cm (60\")   PainSc: 0-No pain     ECOG score: 0         PHQ-9 Total Score:                    Result Review :   The following data was reviewed by: Champ Estrada MD on 06/04/2025:  Lab Results   Component Value Date    HGB 14.9 06/04/2025    HCT 45.9 06/04/2025    MCV 87.1 06/04/2025     06/04/2025    WBC 9.98 06/04/2025    NEUTROABS 5.80 06/04/2025    LYMPHSABS 2.76 06/04/2025    MONOSABS 0.93 (H) 06/04/2025    EOSABS 0.37 06/04/2025    BASOSABS 0.06 06/04/2025     Lab Results   Component Value Date    GLUCOSE 160 (H) 06/04/2025    BUN 20.5 (H) 06/04/2025    CREATININE 1.64 (H) 06/04/2025     06/04/2025    K 4.1 06/04/2025     06/04/2025    CO2 24.0 06/04/2025    CALCIUM 10.5 06/04/2025    PROTEINTOT 7.9 06/04/2025    ALBUMIN 4.9 06/04/2025    BILITOT 0.6 06/04/2025    ALKPHOS 60 06/04/2025    AST 22 06/04/2025    ALT 25 06/04/2025     Lab Results   Component Value Date    MG 2.1 06/04/2025    PHOS 3.8 06/04/2025    FREET4 1.8 03/02/2021    TSH 3.120 01/16/2023     No results found for: \"IRON\", \"LABIRON\", \"TRANSFERRIN\", \"TIBC\"  No results found for: \"LDH\", \"FERRITIN\", \"JCIFHWXI24\", \"FOLATE\"  No results found for: \"PSA\", \"CEA\", \"AFP\", \"\", \"\"          Assessment and Plan    Diagnoses and all orders for this visit:    1. Malignant neoplasm of upper-outer quadrant of right breast in female, estrogen receptor positive (Primary)  Assessment & Plan:  Status post treatments as outlined.  Clinically doing well.  I see no evidence of disease recurrence by her history or physical examination.  Follow-up in the fall with mammogram prior.      2. Osteopenia of lumbar spine  Assessment & Plan:  Patient has been on Prolia for " some time with improvement of the bone density.  Further treatment with Prolia is not recommended.  I will give her a single dose of Reclast today.  She should follow-up with her PCP for ongoing monitoring of her bone density and treatment as needed.    Orders:  -     CBC and Differential; Future  -     Comprehensive metabolic panel; Future  -     Magnesium; Future  -     Phosphorus; Future  -     Lab Appointment Request; Future  -     Clinic Appointment Request; Future  -     Infusion Appointment Request 3; Future    3. History of breast cancer  Assessment & Plan:  Mammogram before next visit    Orders:  -     Mammo Screening Digital Tomosynthesis Bilateral With CAD; Future    Other orders  -     sodium chloride 0.9 % infusion  -     zoledronic acid (RECLAST) infusion 5 mg            Patient Follow Up: Oct with mammogram prior    Patient was given instructions and counseling regarding her condition or for health maintenance advice. Please see specific information pulled into the AVS if appropriate.     Champ Estrada MD    6/4/2025

## 2025-06-04 NOTE — ASSESSMENT & PLAN NOTE
Patient has been on Prolia for some time with improvement of the bone density.  Further treatment with Prolia is not recommended.  I will give her a single dose of Reclast today.  She should follow-up with her PCP for ongoing monitoring of her bone density and treatment as needed.

## 2025-06-16 ENCOUNTER — OFFICE VISIT (OUTPATIENT)
Dept: FAMILY MEDICINE CLINIC | Facility: CLINIC | Age: 60
End: 2025-06-16
Payer: OTHER GOVERNMENT

## 2025-06-16 VITALS
HEART RATE: 103 BPM | TEMPERATURE: 97.3 F | OXYGEN SATURATION: 97 % | SYSTOLIC BLOOD PRESSURE: 114 MMHG | DIASTOLIC BLOOD PRESSURE: 80 MMHG | WEIGHT: 165.7 LBS | BODY MASS INDEX: 32.36 KG/M2

## 2025-06-16 DIAGNOSIS — E11.9 TYPE 2 DIABETES MELLITUS WITHOUT COMPLICATION, WITHOUT LONG-TERM CURRENT USE OF INSULIN: Primary | ICD-10-CM

## 2025-06-16 DIAGNOSIS — Z12.4 SCREENING FOR CERVICAL CANCER: ICD-10-CM

## 2025-06-16 DIAGNOSIS — E03.9 HYPOTHYROIDISM, UNSPECIFIED TYPE: ICD-10-CM

## 2025-06-16 PROBLEM — E05.90 HYPERTHYROIDISM: Status: RESOLVED | Noted: 2021-07-01 | Resolved: 2025-06-16

## 2025-06-16 LAB
ALBUMIN UR-MCNC: 7.9 MG/DL
CHOLEST SERPL-MCNC: 179 MG/DL (ref 0–200)
CREAT UR-MCNC: 57.7 MG/DL
HBA1C MFR BLD: 9.1 % (ref 4.8–5.6)
HDLC SERPL-MCNC: 52 MG/DL (ref 40–60)
LDLC SERPL CALC-MCNC: 98 MG/DL (ref 0–100)
LDLC/HDLC SERPL: 1.79 {RATIO}
MICROALBUMIN/CREAT UR: 136.9 MG/G (ref 0–29)
TRIGL SERPL-MCNC: 170 MG/DL (ref 0–150)
TSH SERPL DL<=0.05 MIU/L-ACNC: 7.06 UIU/ML (ref 0.27–4.2)
VLDLC SERPL-MCNC: 29 MG/DL (ref 5–40)

## 2025-06-16 PROCEDURE — 80061 LIPID PANEL: CPT | Performed by: FAMILY MEDICINE

## 2025-06-16 PROCEDURE — 82043 UR ALBUMIN QUANTITATIVE: CPT | Performed by: FAMILY MEDICINE

## 2025-06-16 PROCEDURE — 82570 ASSAY OF URINE CREATININE: CPT | Performed by: FAMILY MEDICINE

## 2025-06-16 PROCEDURE — 84443 ASSAY THYROID STIM HORMONE: CPT | Performed by: FAMILY MEDICINE

## 2025-06-16 PROCEDURE — 83036 HEMOGLOBIN GLYCOSYLATED A1C: CPT | Performed by: FAMILY MEDICINE

## 2025-06-16 RX ORDER — LANOLIN ALCOHOL/MO/W.PET/CERES
1000 CREAM (GRAM) TOPICAL DAILY
COMMUNITY

## 2025-06-16 NOTE — PROGRESS NOTES
Chief Complaint    Hyperlipidemia (Here for labs)    Subjective      Fiona Portillo presents to [facility]    1.) NIDDM/HLD/HYPOTHYROIDISM : Most recent A1C measured at 8.20%  - 1 year ago. Pt presents with no issues per her current medications. Upcoming diabetic eye exam.  No pedal or ocular complaints. No episodes of hyper- or hyperglycemia, since last visit. Also due for repeat thyroid testing.  Most recent TSH WNL.  No reported sxs concerning for poorly controlled hypothyroidism.    Objective     Vital Signs:     /80 (BP Location: Left arm, Patient Position: Sitting, Cuff Size: Adult)   Pulse 103   Temp 97.3 °F (36.3 °C) (Infrared)   Wt 75.2 kg (165 lb 11.2 oz)   SpO2 97%   BMI 32.36 kg/m²       Physical Exam  Vitals reviewed.   Constitutional:       General: She is not in acute distress.     Appearance: Normal appearance. She is well-developed.   HENT:      Head: Normocephalic and atraumatic.      Right Ear: Hearing and external ear normal.      Left Ear: Hearing and external ear normal.      Nose: Nose normal.   Eyes:      General: Lids are normal.         Right eye: No discharge.         Left eye: No discharge.      Conjunctiva/sclera: Conjunctivae normal.   Pulmonary:      Effort: Pulmonary effort is normal.   Abdominal:      General: There is no distension.   Musculoskeletal:         General: No swelling.      Cervical back: Neck supple.   Skin:     Coloration: Skin is not jaundiced.      Findings: No erythema.   Neurological:      Mental Status: She is alert. Mental status is at baseline.   Psychiatric:         Mood and Affect: Mood and affect normal.         Thought Content: Thought content normal.     BMI is >= 30 and <35. (Class 1 Obesity). The following options were offered after discussion;: information on healthy weight added to patient's after visit summary      Assessment and Plan     Diagnoses and all orders for this visit:    1. Type 2 diabetes  mellitus without complication, without long-term current use of insulin (Primary)  Comments:  Repeat labs as noted. Will await dilated retinal exam. Continue medications, as prescribed. Additional recs per review of labs.  Orders:  -     Hemoglobin A1c  -     Lipid Panel  -     Microalbumin / Creatinine Urine Ratio - Urine, Clean Catch    2. Hypothyroidism, unspecified type  Comments:  Repeat TSH as noted. Additional recs per review.  Orders:  -     TSH    3. Screening for cervical cancer  Comments:  Pt will schedule Well Woman Exam.    Other orders  -     COVID-19 (Pfizer) 12yrs+ (COMIRNATY)  -     Tdap Vaccine Greater Than or Equal To 6yo IM    Follow Up     Return in about 3 months (around 9/16/2025).    Patient was given instructions and counseling regarding her condition or for health maintenance advice. Please see specific information pulled into the AVS if appropriate.

## 2025-06-16 NOTE — PROGRESS NOTES
Venipuncture Blood Specimen Collection  Venipuncture performed in left arm  by Gia Rodriguez with good hemostasis. Patient tolerated the procedure well without complications.   06/16/25   Gia Rodriguez

## 2025-06-20 ENCOUNTER — OFFICE VISIT (OUTPATIENT)
Dept: FAMILY MEDICINE CLINIC | Facility: CLINIC | Age: 60
End: 2025-06-20
Payer: OTHER GOVERNMENT

## 2025-06-20 VITALS
BODY MASS INDEX: 32.69 KG/M2 | TEMPERATURE: 98 F | WEIGHT: 167.4 LBS | SYSTOLIC BLOOD PRESSURE: 120 MMHG | HEART RATE: 114 BPM | DIASTOLIC BLOOD PRESSURE: 80 MMHG | OXYGEN SATURATION: 96 %

## 2025-06-20 DIAGNOSIS — E03.9 HYPOTHYROIDISM, UNSPECIFIED TYPE: ICD-10-CM

## 2025-06-20 DIAGNOSIS — E11.65 UNCONTROLLED TYPE 2 DIABETES MELLITUS WITH HYPERGLYCEMIA: Primary | ICD-10-CM

## 2025-06-20 PROCEDURE — 99214 OFFICE O/P EST MOD 30 MIN: CPT | Performed by: FAMILY MEDICINE

## 2025-06-20 RX ORDER — LEVOTHYROXINE SODIUM 150 UG/1
150 TABLET ORAL
Qty: 90 TABLET | Refills: 0 | Status: SHIPPED | OUTPATIENT
Start: 2025-06-20 | End: 2025-07-20

## 2025-06-20 RX ORDER — GLIPIZIDE 5 MG/1
TABLET ORAL
Qty: 180 TABLET | Refills: 1 | Status: SHIPPED | OUTPATIENT
Start: 2025-06-20

## 2025-06-20 NOTE — PROGRESS NOTES
Chief Complaint    Follow-up (Following up on labs)    Subjective      Fiona Portillo presents to [facility]    History of Present Illness    1.) HYPOTHYROIDISM : Most recent labs revealed elevation of TSH. Pt reports fatigue and hair loss. She is currently prescribed Levothyroxine 125 mcg. No complaints regarding dose.     2.) UNCONTROLLED DIABETES : Recent increase in A1C from 8.20% to 9.10%. Patient is currently prescribed Glipizide + Invokana. No complaints regarding dose of those medications. Unclear whether recent change to diet.    Objective     Vital Signs:     /80 (BP Location: Left arm, Patient Position: Sitting, Cuff Size: Adult)   Pulse 114   Temp 98 °F (36.7 °C) (Infrared)   Wt 75.9 kg (167 lb 6.4 oz)   SpO2 96%   BMI 32.69 kg/m²       Physical Exam  Vitals reviewed.   Constitutional:       General: She is not in acute distress.     Appearance: Normal appearance. She is well-developed.   HENT:      Head: Normocephalic and atraumatic.      Right Ear: Hearing and external ear normal.      Left Ear: Hearing and external ear normal.      Nose: Nose normal.   Eyes:      General: Lids are normal.         Right eye: No discharge.         Left eye: No discharge.      Conjunctiva/sclera: Conjunctivae normal.   Cardiovascular:      Rate and Rhythm: Normal rate and regular rhythm.   Pulmonary:      Effort: Pulmonary effort is normal.      Breath sounds: Normal breath sounds.   Abdominal:      General: There is no distension.   Musculoskeletal:         General: No swelling.      Cervical back: Neck supple.   Skin:     Coloration: Skin is not jaundiced.      Findings: No erythema.   Neurological:      Mental Status: She is alert. Mental status is at baseline.   Psychiatric:         Mood and Affect: Mood and affect normal.         Thought Content: Thought content normal.     Assessment and Plan     Diagnoses and all orders for this visit:    1. Uncontrolled type 2  diabetes mellitus with hyperglycemia (Primary)  Comments:  Shared decision to increase dose of Glipizide as noted. Continue Invokana at current dose. Continue to make adjustments to diet. Log BG x 2 wks and return.    2. Hypothyroidism, unspecified type  Comments:  Dose of Levothyroxine increased as noted. We expect to follow up in with labs in 6 weeks. No ofc visit needed for lab.  Orders:  -     TSH; Future    Other orders  -     levothyroxine (Synthroid) 150 MCG tablet; Take 1 tablet by mouth Every Morning for 30 days.  Dispense: 90 tablet; Refill: 0  -     glipizide (GLUCOTROL) 5 MG tablet; 15 mg in the AM. 10 am in the AM.  Dispense: 180 tablet; Refill: 1    Follow Up : Pt will drop off blood glucose log sheet in 2 weeks, repeat TSH via lab visit in 6 weeks, office visit in 3 mos.    Patient was given instructions and counseling regarding her condition or for health maintenance advice. Please see specific information pulled into the AVS if appropriate.

## 2025-08-01 ENCOUNTER — CLINICAL SUPPORT (OUTPATIENT)
Dept: FAMILY MEDICINE CLINIC | Facility: CLINIC | Age: 60
End: 2025-08-01
Payer: OTHER GOVERNMENT

## 2025-08-01 DIAGNOSIS — E03.9 HYPOTHYROIDISM, UNSPECIFIED TYPE: ICD-10-CM

## 2025-08-01 LAB — TSH SERPL DL<=0.05 MIU/L-ACNC: 0.07 UIU/ML (ref 0.27–4.2)

## 2025-08-01 PROCEDURE — 84443 ASSAY THYROID STIM HORMONE: CPT | Performed by: FAMILY MEDICINE

## 2025-08-01 PROCEDURE — 36415 COLL VENOUS BLD VENIPUNCTURE: CPT | Performed by: FAMILY MEDICINE

## 2025-08-01 NOTE — PROGRESS NOTES
Venipuncture Blood Specimen Collection  Venipuncture performed by Shruthi Villafana MA with good hemostasis. Patient tolerated the procedure well without complications.   08/01/25   Shruthi Villafana MA

## 2025-08-04 ENCOUNTER — RESULTS FOLLOW-UP (OUTPATIENT)
Dept: FAMILY MEDICINE CLINIC | Facility: CLINIC | Age: 60
End: 2025-08-04
Payer: OTHER GOVERNMENT

## 2025-08-04 DIAGNOSIS — E03.9 HYPOTHYROIDISM, UNSPECIFIED TYPE: Primary | ICD-10-CM

## 2025-08-07 RX ORDER — GLIPIZIDE 5 MG/1
10 TABLET ORAL DAILY
Qty: 180 TABLET | Refills: 3 | OUTPATIENT
Start: 2025-08-07

## 2025-08-12 RX ORDER — BUPROPION HYDROCHLORIDE 150 MG/1
150 TABLET ORAL EVERY MORNING
Qty: 90 TABLET | Refills: 3 | Status: SHIPPED | OUTPATIENT
Start: 2025-08-12